# Patient Record
Sex: MALE | Race: WHITE | NOT HISPANIC OR LATINO | Employment: OTHER | URBAN - METROPOLITAN AREA
[De-identification: names, ages, dates, MRNs, and addresses within clinical notes are randomized per-mention and may not be internally consistent; named-entity substitution may affect disease eponyms.]

---

## 2019-12-23 ENCOUNTER — HISTORICAL (OUTPATIENT)
Dept: ADMINISTRATIVE | Facility: HOSPITAL | Age: 49
End: 2019-12-23

## 2019-12-26 LAB — FINAL CULTURE: NORMAL

## 2022-04-11 ENCOUNTER — HISTORICAL (OUTPATIENT)
Dept: ADMINISTRATIVE | Facility: HOSPITAL | Age: 52
End: 2022-04-11

## 2022-04-28 VITALS
WEIGHT: 248 LBS | DIASTOLIC BLOOD PRESSURE: 79 MMHG | BODY MASS INDEX: 31.83 KG/M2 | SYSTOLIC BLOOD PRESSURE: 123 MMHG | HEIGHT: 74 IN

## 2023-07-11 DIAGNOSIS — I26.99 PULMONARY EMBOLISM: Primary | ICD-10-CM

## 2023-07-24 ENCOUNTER — LAB VISIT (OUTPATIENT)
Dept: LAB | Facility: HOSPITAL | Age: 53
End: 2023-07-24
Payer: MEDICARE

## 2023-07-24 DIAGNOSIS — L02.92 RECURRENT BOILS: ICD-10-CM

## 2023-07-24 DIAGNOSIS — R97.8 OTHER ABNORMAL TUMOR MARKERS: ICD-10-CM

## 2023-07-24 DIAGNOSIS — R64 CACHEXIA: ICD-10-CM

## 2023-07-24 DIAGNOSIS — R78.9 FINDING OF UNSPECIFIED SUBSTANCE, NOT NORMALLY FOUND IN BLOOD: ICD-10-CM

## 2023-07-24 DIAGNOSIS — I82.409 DVT (DEEP VENOUS THROMBOSIS): ICD-10-CM

## 2023-07-24 DIAGNOSIS — I82.412 DVT OF DEEP FEMORAL VEIN, LEFT: Primary | ICD-10-CM

## 2023-07-24 DIAGNOSIS — R64 CACHEXIA: Primary | ICD-10-CM

## 2023-07-24 DIAGNOSIS — I82.412 DVT OF DEEP FEMORAL VEIN, LEFT: ICD-10-CM

## 2023-07-24 LAB
ALBUMIN SERPL-MCNC: 3.9 G/DL (ref 3.5–5)
ALBUMIN/GLOB SERPL: 1.1 RATIO (ref 1.1–2)
ALP SERPL-CCNC: 81 UNIT/L (ref 40–150)
ALT SERPL-CCNC: 32 UNIT/L (ref 0–55)
AST SERPL-CCNC: 22 UNIT/L (ref 5–34)
BASOPHILS # BLD AUTO: 0.12 X10(3)/MCL
BASOPHILS NFR BLD AUTO: 1.3 %
BILIRUBIN DIRECT+TOT PNL SERPL-MCNC: 0.5 MG/DL
BUN SERPL-MCNC: 13 MG/DL (ref 8.4–25.7)
CALCIUM SERPL-MCNC: 9.4 MG/DL (ref 8.4–10.2)
CANCER AG19-9 SERPL-ACNC: 23.45 UNIT/ML (ref 0–37)
CEA SERPL-MCNC: 3.91 NG/ML (ref 0–3)
CHLORIDE SERPL-SCNC: 108 MMOL/L (ref 98–107)
CO2 SERPL-SCNC: 24 MMOL/L (ref 22–29)
CREAT SERPL-MCNC: 0.86 MG/DL (ref 0.73–1.18)
CRP SERPL-MCNC: 4.1 MG/L
EOSINOPHIL # BLD AUTO: 0.1 X10(3)/MCL (ref 0–0.9)
EOSINOPHIL NFR BLD AUTO: 1.1 %
ERYTHROCYTE [DISTWIDTH] IN BLOOD BY AUTOMATED COUNT: 14 % (ref 11.5–17)
ERYTHROCYTE [SEDIMENTATION RATE] IN BLOOD: 1 MM/HR (ref 0–15)
FERRITIN SERPL-MCNC: 126.5 NG/ML (ref 21.81–274.66)
GFR SERPLBLD CREATININE-BSD FMLA CKD-EPI: >60 MLS/MIN/1.73/M2
GLOBULIN SER-MCNC: 3.5 GM/DL (ref 2.4–3.5)
GLUCOSE SERPL-MCNC: 230 MG/DL (ref 74–100)
HCT VFR BLD AUTO: 50.8 % (ref 42–52)
HGB BLD-MCNC: 16.5 G/DL (ref 14–18)
IGA SERPL-MCNC: 208 MG/DL (ref 63–484)
IGG SERPL-MCNC: 992 MG/DL (ref 540–1822)
IGM SERPL-MCNC: 127 MG/DL (ref 22–240)
IGM SERPL-MCNC: 127 MG/DL (ref 22–240)
IMM GRANULOCYTES # BLD AUTO: 0.19 X10(3)/MCL (ref 0–0.04)
IMM GRANULOCYTES NFR BLD AUTO: 2.1 %
LYMPHOCYTES # BLD AUTO: 2.29 X10(3)/MCL (ref 0.6–4.6)
LYMPHOCYTES NFR BLD AUTO: 25.5 %
MCH RBC QN AUTO: 29.3 PG (ref 27–31)
MCHC RBC AUTO-ENTMCNC: 32.5 G/DL (ref 33–36)
MCV RBC AUTO: 90.2 FL (ref 80–94)
MONOCYTES # BLD AUTO: 0.5 X10(3)/MCL (ref 0.1–1.3)
MONOCYTES NFR BLD AUTO: 5.6 %
NEUTROPHILS # BLD AUTO: 5.78 X10(3)/MCL (ref 2.1–9.2)
NEUTROPHILS NFR BLD AUTO: 64.4 %
PLATELET # BLD AUTO: 291 X10(3)/MCL (ref 130–400)
PMV BLD AUTO: 9.8 FL (ref 7.4–10.4)
POTASSIUM SERPL-SCNC: 4.4 MMOL/L (ref 3.5–5.1)
PROT SERPL-MCNC: 7.4 GM/DL (ref 6.4–8.3)
RBC # BLD AUTO: 5.63 X10(6)/MCL (ref 4.7–6.1)
SODIUM SERPL-SCNC: 137 MMOL/L (ref 136–145)
WBC # SPEC AUTO: 8.98 X10(3)/MCL (ref 4.5–11.5)

## 2023-07-24 PROCEDURE — 86334 IMMUNOFIX E-PHORESIS SERUM: CPT

## 2023-07-24 PROCEDURE — 85652 RBC SED RATE AUTOMATED: CPT

## 2023-07-24 PROCEDURE — 84165 PROTEIN E-PHORESIS SERUM: CPT

## 2023-07-24 PROCEDURE — 86301 IMMUNOASSAY TUMOR CA 19-9: CPT

## 2023-07-24 PROCEDURE — 82378 CARCINOEMBRYONIC ANTIGEN: CPT

## 2023-07-24 PROCEDURE — 82728 ASSAY OF FERRITIN: CPT

## 2023-07-24 PROCEDURE — 80053 COMPREHEN METABOLIC PANEL: CPT

## 2023-07-24 PROCEDURE — 86140 C-REACTIVE PROTEIN: CPT

## 2023-07-24 PROCEDURE — 82784 ASSAY IGA/IGD/IGG/IGM EACH: CPT

## 2023-07-24 PROCEDURE — 86146 BETA-2 GLYCOPROTEIN ANTIBODY: CPT

## 2023-07-24 PROCEDURE — 85025 COMPLETE CBC W/AUTO DIFF WBC: CPT

## 2023-07-24 PROCEDURE — 36415 COLL VENOUS BLD VENIPUNCTURE: CPT

## 2023-07-24 PROCEDURE — 83521 IG LIGHT CHAINS FREE EACH: CPT

## 2023-07-25 ENCOUNTER — DOCUMENTATION ONLY (OUTPATIENT)
Dept: HEMATOLOGY/ONCOLOGY | Facility: CLINIC | Age: 53
End: 2023-07-25
Payer: MEDICARE

## 2023-07-25 LAB
KAPPA LC FREE SER-MCNC: 1.37 MG/DL (ref 0.33–1.94)
KAPPA LC FREE/LAMBDA FREE SER: 1.01 {RATIO} (ref 0.26–1.65)
LAMBDA LC FREE SERPL-MCNC: 1.36 MG/DL (ref 0.57–2.63)
MAYO GENERIC ORDERABLE RESULT: NORMAL

## 2023-07-25 NOTE — PROGRESS NOTES
Urgent Referral called into Dr. Ford's office for recurrent boil to testicular area. They next appointment available is Sept. 19, 2023 at 1345 and they will put him on the cancellation list for earlier appointment. Dr. Hutton informed on when they could see him. Patient was called spoke with him and female regarding date, time, phone number and location of his appointment.

## 2023-07-26 LAB — MAYO GENERIC ORDERABLE RESULT: NORMAL

## 2023-07-27 LAB
ALBUMIN % SPEP (OHS): 47.48
ALBUMIN SERPL-MCNC: 3.3 G/DL (ref 3.5–5)
ALBUMIN/GLOB SERPL: 0.9 RATIO (ref 1.1–2)
ALPHA 1 GLOB (OHS): 0.22 GM/DL
ALPHA 1 GLOB% (OHS): 3.22
ALPHA 2 GLOB % (OHS): 14.33
ALPHA 2 GLOB (OHS): 0.99 GM/DL
BETA GLOB (OHS): 1.18 GM/DL
BETA GLOB% (OHS): 17.12
GAMMA GLOBULIN % (OHS): 17.86
GAMMA GLOBULIN (OHS): 1.23 GM/DL
GLOBULIN SER-MCNC: 3.6 GM/DL (ref 2.4–3.5)
M SPIKE % (OHS): ABNORMAL
M SPIKE (OHS): ABNORMAL
PATH REV: NORMAL
PROT SERPL-MCNC: 6.9 GM/DL (ref 6.4–8.3)

## 2023-07-31 ENCOUNTER — HOSPITAL ENCOUNTER (EMERGENCY)
Facility: HOSPITAL | Age: 53
Discharge: HOME | End: 2023-07-31
Attending: INTERNAL MEDICINE
Payer: MEDICARE

## 2023-07-31 ENCOUNTER — HOSPITAL ENCOUNTER (OUTPATIENT)
Dept: RADIOLOGY | Facility: HOSPITAL | Age: 53
Discharge: HOME | End: 2023-07-31
Attending: INTERNAL MEDICINE
Payer: MEDICARE

## 2023-07-31 VITALS
DIASTOLIC BLOOD PRESSURE: 97 MMHG | HEART RATE: 74 BPM | SYSTOLIC BLOOD PRESSURE: 149 MMHG | RESPIRATION RATE: 16 BRPM | WEIGHT: 276 LBS | HEIGHT: 74 IN | OXYGEN SATURATION: 97 % | BODY MASS INDEX: 35.42 KG/M2 | TEMPERATURE: 97 F

## 2023-07-31 DIAGNOSIS — R64 CACHEXIA: ICD-10-CM
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
## 2023-07-31 DIAGNOSIS — S00.96XA INSECT BITE OF HEAD, UNSPECIFIED PART, INITIAL ENCOUNTER: Primary | ICD-10-CM

## 2023-07-31 PROCEDURE — 96372 THER/PROPH/DIAG INJ SC/IM: CPT | Mod: 59 | Performed by: INTERNAL MEDICINE

## 2023-07-31 PROCEDURE — 74177 CT ABD & PELVIS W/CONTRAST: CPT | Mod: TC

## 2023-07-31 PROCEDURE — 63600175 PHARM REV CODE 636 W HCPCS: Performed by: INTERNAL MEDICINE

## 2023-07-31 PROCEDURE — A9503 TC99M MEDRONATE: HCPCS

## 2023-07-31 PROCEDURE — 25500020 PHARM REV CODE 255: Performed by: INTERNAL MEDICINE

## 2023-07-31 PROCEDURE — 99283 EMERGENCY DEPT VISIT LOW MDM: CPT | Mod: 25

## 2023-07-31 PROCEDURE — 71260 CT THORAX DX C+: CPT | Mod: TC

## 2023-07-31 RX ORDER — DIPHENHYDRAMINE HYDROCHLORIDE 50 MG/ML
25 INJECTION INTRAMUSCULAR; INTRAVENOUS
Status: COMPLETED | OUTPATIENT
Start: 2023-07-31 | End: 2023-07-31

## 2023-07-31 RX ADMIN — IOPAMIDOL 100 ML: 755 INJECTION, SOLUTION INTRAVENOUS at 08:07

## 2023-07-31 RX ADMIN — DIPHENHYDRAMINE HYDROCHLORIDE 25 MG: 50 INJECTION INTRAMUSCULAR; INTRAVENOUS at 09:07

## 2023-07-31 NOTE — ED PROVIDER NOTES
Encounter Date: 7/31/2023  History from patient     History     Chief Complaint   Patient presents with    Allergic Reaction     Ambulated into ER from radiology department, pt reports tickle in throat after having IV contrast for testing     HPI    Patient was getting CT scan done and he noticed a fly flying in the room, and then was getting out he felt burning sensation in his left face and then he had a little tickling in the throat and spit up some sputum.  So they brought him to the emergency room.  He is a little spot on his left cheek.    Review of patient's allergies indicates:   Allergen Reactions    Buprenorphine-naloxone     Tylox [oxycodone-acetaminophen]      No past medical history on file.  Past Surgical History:   Procedure Laterality Date    NASAL POLYP EXCISION      NECK SURGERY      PARTIAL KNEE ARTHROPLASTY Left     ROTATOR CUFF REPAIR      TYMPANOPLASTY       Family History   Problem Relation Age of Onset    Seizures Mother     Heart failure Father      Social History     Tobacco Use    Smoking status: Every Day     Current packs/day: 0.50     Average packs/day: 0.5 packs/day for 43.6 years (21.8 ttl pk-yrs)     Types: Cigarettes     Start date: 1980    Smokeless tobacco: Never   Substance Use Topics    Alcohol use: Never    Drug use: Never     Review of Systems   HENT:  Negative for trouble swallowing and voice change.    Eyes:  Negative for visual disturbance.   Respiratory:  Negative for cough and shortness of breath.    Cardiovascular:  Negative for chest pain.   Gastrointestinal:  Negative for abdominal pain, diarrhea and vomiting.   Genitourinary:  Negative for dysuria and hematuria.   Musculoskeletal:  Negative for gait problem.        No Pain.   Skin:  Negative for color change and rash.        Insect bite to the left face   Neurological:  Negative for headaches.   Psychiatric/Behavioral:  Negative for behavioral problems and sleep disturbance.    All other systems reviewed and are  negative.      Physical Exam     Initial Vitals [07/31/23 0907]   BP Pulse Resp Temp SpO2   (!) 149/97 74 16 97.3 °F (36.3 °C) 97 %      MAP       --         Physical Exam    Nursing note and vitals reviewed.  Constitutional: He appears well-developed.   HENT:   Head: Atraumatic.   Nose: Nose normal.   Mouth/Throat: Oropharynx is clear and moist. No oropharyngeal exudate.   Eyes: EOM are normal. Pupils are equal, round, and reactive to light.   Neck: Neck supple.   No stridor   Cardiovascular:  Normal rate.           Pulmonary/Chest: Breath sounds normal.   Musculoskeletal:         General: Normal range of motion.      Cervical back: Neck supple.     Neurological: He is alert. GCS score is 15. GCS eye subscore is 4. GCS verbal subscore is 5. GCS motor subscore is 6.   Skin:   No rash         ED Course   Procedures  Labs Reviewed - No data to display       Imaging Results    None          Medications   diphenhydrAMINE injection 25 mg (has no administration in time range)     Medical Decision Making:   Initial Assessment:   Patient essentially got bitten by an insect in the CT scanner, and had burning sensation on the left cheek, had some ticklish in the throat, but he is gotten better, the rash has not gotten worse, he is small spot on the left cheek, I will give him Benadryl and let him go back to the radiology department to get the bone scan done which is being worked up for some cancer which he does not know where it is at this time.                          Clinical Impression:   Final diagnoses:  [S00.96XA, W57.XXXA] Insect bite of head, unspecified part, initial encounter (Primary)        ED Disposition Condition    Discharge Stable          ED Prescriptions    None       Follow-up Information       Follow up With Specialties Details Why Contact Info    PMD  In 2 days               Abisai Monahan MD  07/31/23 2588

## 2023-08-08 ENCOUNTER — DOCUMENTATION ONLY (OUTPATIENT)
Dept: HEMATOLOGY/ONCOLOGY | Facility: CLINIC | Age: 53
End: 2023-08-08
Payer: MEDICARE

## 2023-08-08 NOTE — PROGRESS NOTES
[facility] in [place] called regarding referral and patient has an appointment schedule for August 29@ 1430.

## 2023-09-21 DIAGNOSIS — I82.412 DVT OF DEEP FEMORAL VEIN, LEFT: Primary | ICD-10-CM

## 2023-09-22 ENCOUNTER — OFFICE VISIT (OUTPATIENT)
Dept: HEMATOLOGY/ONCOLOGY | Facility: CLINIC | Age: 53
End: 2023-09-22
Payer: MEDICARE

## 2023-09-22 VITALS
HEART RATE: 68 BPM | DIASTOLIC BLOOD PRESSURE: 78 MMHG | SYSTOLIC BLOOD PRESSURE: 126 MMHG | WEIGHT: 284.63 LBS | OXYGEN SATURATION: 98 % | RESPIRATION RATE: 20 BRPM | BODY MASS INDEX: 36.53 KG/M2 | TEMPERATURE: 98 F | HEIGHT: 74 IN

## 2023-09-22 DIAGNOSIS — R64 CACHEXIA: ICD-10-CM

## 2023-09-22 DIAGNOSIS — R77.1 ELEVATED SERUM GLOBULIN LEVEL: ICD-10-CM

## 2023-09-22 DIAGNOSIS — I82.412 DVT OF DEEP FEMORAL VEIN, LEFT: Primary | ICD-10-CM

## 2023-09-22 DIAGNOSIS — R04.2 COUGHING UP BLOOD: Primary | ICD-10-CM

## 2023-09-22 DIAGNOSIS — I26.99 ACUTE PULMONARY EMBOLISM, UNSPECIFIED PULMONARY EMBOLISM TYPE, UNSPECIFIED WHETHER ACUTE COR PULMONALE PRESENT: ICD-10-CM

## 2023-09-22 DIAGNOSIS — L02.92 RECURRENT BOILS: ICD-10-CM

## 2023-09-22 PROCEDURE — 3078F DIAST BP <80 MM HG: CPT | Mod: CPTII,S$GLB,, | Performed by: INTERNAL MEDICINE

## 2023-09-22 PROCEDURE — 99215 OFFICE O/P EST HI 40 MIN: CPT | Mod: S$GLB,,, | Performed by: INTERNAL MEDICINE

## 2023-09-22 PROCEDURE — 3008F BODY MASS INDEX DOCD: CPT | Mod: CPTII,S$GLB,, | Performed by: INTERNAL MEDICINE

## 2023-09-22 PROCEDURE — 99215 PR OFFICE/OUTPT VISIT, EST, LEVL V, 40-54 MIN: ICD-10-PCS | Mod: S$GLB,,, | Performed by: INTERNAL MEDICINE

## 2023-09-22 PROCEDURE — 99999 PR PBB SHADOW E&M-EST. PATIENT-LVL IV: CPT | Mod: PBBFAC,,, | Performed by: INTERNAL MEDICINE

## 2023-09-22 PROCEDURE — 3078F PR MOST RECENT DIASTOLIC BLOOD PRESSURE < 80 MM HG: ICD-10-PCS | Mod: CPTII,S$GLB,, | Performed by: INTERNAL MEDICINE

## 2023-09-22 PROCEDURE — 3074F SYST BP LT 130 MM HG: CPT | Mod: CPTII,S$GLB,, | Performed by: INTERNAL MEDICINE

## 2023-09-22 PROCEDURE — 3074F PR MOST RECENT SYSTOLIC BLOOD PRESSURE < 130 MM HG: ICD-10-PCS | Mod: CPTII,S$GLB,, | Performed by: INTERNAL MEDICINE

## 2023-09-22 PROCEDURE — 99999 PR PBB SHADOW E&M-EST. PATIENT-LVL IV: ICD-10-PCS | Mod: PBBFAC,,, | Performed by: INTERNAL MEDICINE

## 2023-09-22 PROCEDURE — 1159F PR MEDICATION LIST DOCUMENTED IN MEDICAL RECORD: ICD-10-PCS | Mod: CPTII,S$GLB,, | Performed by: INTERNAL MEDICINE

## 2023-09-22 PROCEDURE — 3008F PR BODY MASS INDEX (BMI) DOCUMENTED: ICD-10-PCS | Mod: CPTII,S$GLB,, | Performed by: INTERNAL MEDICINE

## 2023-09-22 PROCEDURE — 1159F MED LIST DOCD IN RCRD: CPT | Mod: CPTII,S$GLB,, | Performed by: INTERNAL MEDICINE

## 2023-09-22 RX ORDER — METFORMIN HYDROCHLORIDE 1000 MG/1
1000 TABLET ORAL 2 TIMES DAILY
COMMUNITY
Start: 2023-09-20

## 2023-09-22 RX ORDER — IPRATROPIUM BROMIDE 21 UG/1
SPRAY, METERED NASAL
COMMUNITY
Start: 2023-08-14

## 2023-09-22 RX ORDER — DULAGLUTIDE 0.75 MG/.5ML
0.75 INJECTION, SOLUTION SUBCUTANEOUS
COMMUNITY
Start: 2023-09-13

## 2023-09-22 RX ORDER — TIZANIDINE 4 MG/1
4 TABLET ORAL 2 TIMES DAILY
COMMUNITY
Start: 2023-09-20

## 2023-09-22 RX ORDER — TEMAZEPAM 30 MG/1
30 CAPSULE ORAL NIGHTLY
COMMUNITY
Start: 2023-08-24

## 2023-09-22 RX ORDER — TAMSULOSIN HYDROCHLORIDE 0.4 MG/1
0.4 CAPSULE ORAL DAILY
COMMUNITY
Start: 2023-09-20

## 2023-09-22 NOTE — PROGRESS NOTES
PATIENT: Aram Thurston  MRN: [id number]  DATE: 9/22/2023        Chief Complaint: Deep Vein Thrombosis (F/u with labs-- Patient reports knots on back of head)      Oncologic History:      History Left DVT and Pulmonary Emboli    Treatment Heparin--> Eliquis--> Savaysa 60mg BID due to cost of Eliquis.    Pathology     Patient is a 52 year old male th a history of asthma who presented to ED at [facility] with LLE swelling/numbness over past 4 days with SOB. Work up was notable for multifocal pulmonary thromboembolism with most proximal emboli lobar, no heart strain. LLE US noted extensive occlusive thrombus in the left proximal great saphenous vein and extending from the left proximal superficial femoral vein into the left calf. He was admitted for acute PE, DVT. Placed on full dose lovenox. He did well symptoms improved on room air. He was transitioned to Eliquis and discharged home. Follow up with PCP within 1 week (may need referral to ortho/neurosurgery for spinal stenosis). At the time of discharged he was referred to the [facility] colorectal group.   AT his initial evaluation here in July he reported a 30 pound weight loss in the Spring; therefore concern for underlying malignancy. CT CAP ordered. He has an appt soon with GI for endoscopy  AT his follow up visit on 923/23 he reported hemoptysis for a year or two. He is now Savaysa 60mg and he is now on Turlicity once a week.   He has seen Dr. Serna ENT in [place] for nasal polyps.   He has herniated discs in his neck and there are plans to re-do surgery in his neck through Dr. Demarco in [facility].     Final Medication List   amitriptyline 10 mg tabletCommonly known as: [id number] mg, Oral, Nightly    Eliquis DVT-PE Treat 30D Start DspkGeneric drug: apixabanTake 2 tablets by mouth twice daily for 7 days, then take 1 tablet by mouth twice daily thereafter.    HYDROcodone-acetaminophen  mg per tablet    LABS:  06/08/23:  WBC 10.0, HGB 15.6, MCV 88, PLT 189K, CMP  "WNL except glucose 236  BNP 11*   INR 1.0 /PTT 29      Past Surgical History:   Procedure Laterality Date   BACK SURGERY   JOINT REPLACEMENT   NECK SURGERY     Family history: Cousin and father MI before age 50; FH reviewed and non-contributory to this present illness  Social History     Tobacco Use   Smoking status: Every Day   Packs/day: 0.50   Types: Cigarettes   Smokeless tobacco: Never   Alcohol use: Not Currently     Allergies   Allergen Reactions   Tylox [Oxycodone-Acetaminophen] Shortness Of Breath     Subjective:   Interval History: Mr. Thurston is a 52 y.o. male who returns for new evaluation and treatment of. DVT and Pulmonary emboi associated with 30 pound weight loss.    9/22/23:  anticardiolipin and myeloma work up negative. He saw Dr. Ford who drained a testicular abscess; then one appeared on the other side. PSA was checked and was normal. CT showed prominent prostate and sigmoid colon wall thickening. He has a GI appt next week. CEA slightly elevated at 3.9. Since no obvious cancer will perform thrombophilia workup. He now reports a "1-2 year" history of intermittent hemoptysis. Since he has smoked since age 14 will refer him to pulmonary for bronchoscopy;.   Past Medical History: History reviewed. No pertinent past medical history.    Past Surgical HIstory:   Past Surgical History:   Procedure Laterality Date    NASAL POLYP EXCISION      NECK SURGERY      PARTIAL KNEE ARTHROPLASTY Left     ROTATOR CUFF REPAIR      TYMPANOPLASTY         Family History:   Family History   Problem Relation Age of Onset    Seizures Mother     Heart failure Father        Social History:  reports that he has been smoking cigarettes. He started smoking about 43 years ago. He has a 21.9 pack-year smoking history. He has never used smokeless tobacco. He reports that he does not drink alcohol and does not use drugs.    Allergies:  Review of patient's allergies indicates:   Allergen Reactions    Buprenorphine-naloxone     " "Tylox [oxycodone-acetaminophen]        Medications:  Current Outpatient Medications   Medication Sig Dispense Refill    amitriptyline (ELAVIL) 75 MG tablet Take 75 mg by mouth 2 (two) times daily as needed.      citalopram (CELEXA) 20 MG tablet Take 1 tablet by mouth once daily.      fluticasone propionate (FLONASE) 50 mcg/actuation nasal spray 1 spray by Each Nostril route daily as needed.      HYDROcodone-acetaminophen (NORCO)  mg per tablet Take 1 tablet by mouth every 6 (six) hours as needed.      ipratropium (ATROVENT) 21 mcg (0.03 %) nasal spray SPRAY TWO SPRAYS IN EACH NOSTRIL THREE TIMES DAILY AS NEEDED FOR RUNNY NOSE      metFORMIN (GLUCOPHAGE) 1000 MG tablet       sumatriptan (IMITREX) 50 MG tablet Take 1 tablet by mouth as needed.      tamsulosin (FLOMAX) 0.4 mg Cap       temazepam (RESTORIL) 30 mg capsule Take 30 mg by mouth every evening.      tiZANidine (ZANAFLEX) 4 MG tablet       TRULICITY 0.75 mg/0.5 mL pen injector Inject 0.75 mg SUBCUTANEOUSLY ONCE EVERY WEEK       No current facility-administered medications for this visit.       Review of Systems   Except as documented all systems reviewed and negative  Review of Systems   Constitutional: Negative.   HENT: Negative.   Eyes: Negative.   Respiratory: Negative. Negative for shortness of breath.   Cardiovascular: Negative.   Gastrointestinal: Negative.   Genitourinary: Negative.   Musculoskeletal: Negative.   Skin: Negative.   Neurological: Negative.   Endo/Heme/Allergies: Negative.   Psychiatric/Behavioral: Negative.   ECOG Performance Status:   ECOG SCORE             Objective:      Vitals:   Vitals:    09/22/23 1030   BP: 126/78   BP Location: Right arm   Pulse: 68   Resp: 20   Temp: 97.5 °F (36.4 °C)   SpO2: 98%   Weight: 129.1 kg (284 lb 9.6 oz)   Height: 6' 2.02" (1.88 m)     BMI: Body mass index is 36.52 kg/m².    Physical Exam  Physical Exam  Vitals and nursing note reviewed.   Constitutional:   General: He is not in acute " distress.  Appearance: Normal appearance. He is morbidly obese. He is not toxic-appearing.   HENT:   Head: Normocephalic and atraumatic.   Nose: No rhinorrhea.   Mouth/Throat:   Mouth: Mucous membranes are moist.   Eyes:   Pupils: Pupils are equal, round, and reactive to light.   Cardiovascular:   Rate and Rhythm: Normal rate and regular rhythm.   Pulses: Normal pulses.   Heart sounds: Normal heart sounds. No murmur heard.  Pulmonary:   Effort: Pulmonary effort is normal. No respiratory distress.   Breath sounds: Normal breath sounds. No wheezing.   Abdominal:   General: Bowel sounds are normal. There is no distension.   Palpations: Abdomen is soft.   Tenderness: There is no abdominal tenderness. There is no guarding.   Musculoskeletal:   Cervical back: Neck supple.   Right lower leg: No edema.   Left lower leg: No edema.   Skin:  General: Skin is warm and dry.   Neurological:   General: No focal deficit present.   Mental Status: He is alert and oriented to person, place, and time. Mental status is at baseline.   Psychiatric:   Mood and Affect: Mood is anxious.   Behavior: Behavior is cooperative.     Laboratory Data:  Lab Visit on 09/22/2023   Component Date Value Ref Range Status    Sodium Level 09/22/2023 139  136 - 145 mmol/L Final    Potassium Level 09/22/2023 4.3  3.5 - 5.1 mmol/L Final    Chloride 09/22/2023 105  98 - 107 mmol/L Final    Carbon Dioxide 09/22/2023 24  22 - 29 mmol/L Final    Glucose Level 09/22/2023 197 (H)  74 - 100 mg/dL Final    Blood Urea Nitrogen 09/22/2023 10.0  8.4 - 25.7 mg/dL Final    Creatinine 09/22/2023 0.92  0.73 - 1.18 mg/dL Final    Calcium Level Total 09/22/2023 9.5  8.4 - 10.2 mg/dL Final    Protein Total 09/22/2023 7.6  6.4 - 8.3 gm/dL Final    Albumin Level 09/22/2023 4.0  3.5 - 5.0 g/dL Final    Globulin 09/22/2023 3.6 (H)  2.4 - 3.5 gm/dL Final    Albumin/Globulin Ratio 09/22/2023 1.1  1.1 - 2.0 ratio Final    Bilirubin Total 09/22/2023 0.6  <=1.5 mg/dL Final    Alkaline  Phosphatase 2023 83  40 - 150 unit/L Final    Alanine Aminotransferase 2023 23  0 - 55 unit/L Final    Aspartate Aminotransferase 2023 14  5 - 34 unit/L Final    eGFR 2023 >60  mls/min/1.73/m2 Final    WBC 2023 8.04  4.50 - 11.50 x10(3)/mcL Final    RBC 2023 5.79  4.70 - 6.10 x10(6)/mcL Final    Hgb 2023 17.2  14.0 - 18.0 g/dL Final    Hct 2023 53.5 (H)  42.0 - 52.0 % Final    MCV 2023 92.4  80.0 - 94.0 fL Final    MCH 2023 29.7  27.0 - 31.0 pg Final    MCHC 2023 32.1 (L)  33.0 - 36.0 g/dL Final    RDW 2023 13.9  11.5 - 17.0 % Final    Platelet 2023 287  130 - 400 x10(3)/mcL Final    MPV 2023 9.7  7.4 - 10.4 fL Final    Neut % 2023 62.2  % Final    Lymph % 2023 27.5  % Final    Mono % 2023 6.3  % Final    Eos % 2023 2.0  % Final    Basophil % 2023 1.6  % Final    Lymph # 2023 2.21  0.6 - 4.6 x10(3)/mcL Final    Neut # 2023 5.00  2.1 - 9.2 x10(3)/mcL Final    Mono # 2023 0.51  0.1 - 1.3 x10(3)/mcL Final    Eos # 2023 0.16  0 - 0.9 x10(3)/mcL Final    Baso # 2023 0.13  <=0.2 x10(3)/mcL Final    IG# 2023 0.03  0 - 0.04 x10(3)/mcL Final    IG% 2023 0.4  % Final         Imagin2023 CT Angiogram Pulmonary  1. Multifocal pulmonary thromboembolism with most proximal emboli lobar. There is no evidence of right heart strain. 2. Lungs remain clear. 3. Cholelithiasis. 4. Esophageal features presumably due to GERD. 5. Hepatosteatosis. Electronically signed by: Mike Mills DO on 2023 05:49:28 AM US/Eastern Per PQRS, all CT exams are performed using one or more of the following dose reduction techniques: automated exposure control, adjustment of the mA and/or kV according to patient size, or use of iterative reconstruction technique. EXAM: CTA Chest, with Contrast.    23 CT Cervical Spine without Contrast  1. CT imaging shows no acute intracranial  abnormality. 2. Left maxillary sinus disease in this patient with prior ESS. CT CERVICAL SPINE FINDINGS: There is no evidence of fracture. Anterior discectomy and fusion (ACDF) is seen at C4-C5 with stand-alone implanted device. ACDF also is present at C5-C6 with anterior plate with screw fixation. Small anterior marginal osteophyte formation is noted at C3-C4. There is no malalignment. Prevertebral and retropharyngeal soft tissues are normal. Lung apices are clear. IMPRESSION: 1. CT of the cervical spine shows no acute osseous abnormality. 2. Operative changes of C4-C6 ACDF.   6/6/23 CT Head without Contrast  1. CT imaging shows no acute intracranial abnormality. 2. Left maxillary sinus disease in this patient with prior ESS. CT CERVICAL SPINE FINDINGS: There is no evidence of fracture. Anterior discectomy and fusion (ACDF) is seen at C4-C5 with stand-alone implanted device. ACDF also is present at C5-C6 with anterior plate with screw fixation. Small anterior marginal osteophyte formation is noted at C3-C4. There is no malalignment. Prevertebral and retropharyngeal soft tissues are normal. Lung apices are clear. IMPRESSION: 1. CT of the cervical spine shows no acute osseous abnormality. 2. Operative changes of C4-C6 ACDF. 6/6/23 CT Lumbar Spine without Contrast  1. CT shows no acute osseous abnormality within the thoracic spine. 2. Mild degenerative spondylosis. 3. Esophageal features suggesting GERD. CT LUMBAR SPINE FINDINGS: Lumbar vertebral body stature is maintained. Anterior cortical margins are smooth. Operative changes are noted with bilateral intrapedicular screws in L3, L4 and L5 with anchoring vertical rods. Spinous processes have been resected at L3 and L5 with laminectomy of L4. Central canal is poorly evaluated without intrathecal contrast. However, there is severe osseous spinal stenosis at L2-L3. Minimal atherosclerotic calcified plaquing in the abdominal aorta is noted. Imaging continued through the  sacroiliac joints which show no acute pathology. Innumerable diverticula in the sigmoid are seen. IMPRESSION: 1. Severe osseous spinal stenosis at L2-L3. 2. Posterior orthopedic fusion spanning L3 through L5. 3. Sigmoidal diverticulosis. :     6/6/23 CT Thoracic Spine without Contrast  1. CT shows no acute osseous abnormality within the thoracic spine. 2. Mild degenerative spondylosis. 3. Esophageal features suggesting GERD. CT LUMBAR SPINE FINDINGS: Lumbar vertebral body stature is maintained. Anterior cortical margins are smooth. Operative changes are noted with bilateral intrapedicular screws in L3, L4 and L5 with anchoring vertical rods. Spinous processes have been resected at L3 and L5 with laminectomy of L4. Central canal is poorly evaluated without intrathecal contrast. However, there is severe osseous spinal stenosis at L2-L3. Minimal atherosclerotic calcified plaquing in the abdominal aorta is noted. Imaging continued through the sacroiliac joints which show no acute pathology. Innumerable diverticula in the sigmoid are seen. IMPRESSION: 1. Severe osseous spinal stenosis at L2-L3. 2. Posterior orthopedic fusion spanning L3 through L5. 3. Sigmoidal diverticulosis.     7/24/23 CT AP:  1. Mild mucosal prominence, edema, and diverticula at the junction of the descending sigmoid colon suggesting a mild acute diverticulitis borderline hepatomegaly  2. Thoracolumbar spondylosis with postsurgical changes at the lumbar spine and stabilization hardware.  There is suspect moderate to severe encroachment into the central spinal canal and to a lesser extent the neural foramina at the upper and mid lumbar spine.  3. Mild mucosal prominence at the lower esophagus  4. Borderline prostatomegaly with mass effect on the posteroinferior bladder margin.  Underlying pathology must be considered  5. Cholelithiasis without evidence of acute cholecystitis     Assessment:     1. DVT of deep femoral vein, left    2. Acute pulmonary  embolism, unspecified pulmonary embolism type, unspecified whether acute cor pulmonale present    3. Cachexia    4. Elevated serum globulin level    Myeloma work up is negative  CT AP shows borderine prostamegaly with mass effect and mucosal prominence edema and diverticula at the junction of descending sigmoid colon. Since no obvious cancer will perform thrombophilia work up but will refer for colonoscipy; PSA by Dr. Ford was normal at <1..  CEA is slightly elevated at 3.9.to see Dr Tomlin at [facility] on 9/26/23 for GI evaluation.  Plan:     Problem List Items Addressed This Visit    None  Visit Diagnoses       DVT of deep femoral vein, left    -  Primary    Acute pulmonary embolism, unspecified pulmonary embolism type, unspecified whether acute cor pulmonale present        Cachexia        Elevated serum globulin level            Follow up with gastroenterology on 9/26/23 for colonosocpy; not abnormal CT and slightly elevated CEA  PSA by Dr. Ford was normal  Refer to pulmonary for an at least a year history of hemoptysis and has been smoking since age 13 yo; I also suggested he follow up with his ENT   Complete Thombophilia workup today with Factor V, prothrombin gene mutation. Protein C and S, AT III antigen. He has normal anticardiolipin Ab  LOI 2 to evaluate elevated HCT.     Repeat left venous NIVA for ongoing swelling.   RTC in one month

## 2023-09-25 ENCOUNTER — TELEPHONE (OUTPATIENT)
Dept: HEMATOLOGY/ONCOLOGY | Facility: CLINIC | Age: 53
End: 2023-09-25
Payer: MEDICARE

## 2023-09-25 DIAGNOSIS — I82.412 DVT OF DEEP FEMORAL VEIN, LEFT: Primary | ICD-10-CM

## 2023-09-25 NOTE — TELEPHONE ENCOUNTER
----- Message from Ananya Heredia sent at 9/25/2023  1:32 PM CDT -----  Regarding: RE: u/s  U/S 10/02/23 @ [facility]    Left detailed v/m for patient  ----- Message -----  From: Natasha Talavera LPN  Sent: 9/22/2023  12:36 PM CDT  To: Ananya Heredia  Subject: u/s                                              Pt needs left leg u/s soon please

## 2023-10-03 ENCOUNTER — HOSPITAL ENCOUNTER (OUTPATIENT)
Dept: RADIOLOGY | Facility: HOSPITAL | Age: 53
Discharge: HOME | End: 2023-10-03
Attending: INTERNAL MEDICINE
Payer: MEDICARE

## 2023-10-03 ENCOUNTER — OFFICE VISIT (OUTPATIENT)
Dept: HEMATOLOGY/ONCOLOGY | Facility: CLINIC | Age: 53
End: 2023-10-03
Payer: MEDICARE

## 2023-10-03 VITALS
TEMPERATURE: 98 F | BODY MASS INDEX: 36.06 KG/M2 | RESPIRATION RATE: 19 BRPM | DIASTOLIC BLOOD PRESSURE: 79 MMHG | SYSTOLIC BLOOD PRESSURE: 130 MMHG | OXYGEN SATURATION: 99 % | HEIGHT: 74 IN | HEART RATE: 81 BPM | WEIGHT: 281 LBS

## 2023-10-03 DIAGNOSIS — I82.412 DVT OF DEEP FEMORAL VEIN, LEFT: ICD-10-CM

## 2023-10-03 DIAGNOSIS — R04.2 COUGHING UP BLOOD: ICD-10-CM

## 2023-10-03 DIAGNOSIS — K62.5 BRIGHT RED BLOOD PER RECTUM: ICD-10-CM

## 2023-10-03 DIAGNOSIS — I26.99 ACUTE PULMONARY EMBOLISM, UNSPECIFIED PULMONARY EMBOLISM TYPE, UNSPECIFIED WHETHER ACUTE COR PULMONALE PRESENT: Primary | ICD-10-CM

## 2023-10-03 PROCEDURE — 3075F SYST BP GE 130 - 139MM HG: CPT | Mod: CPTII,S$GLB,, | Performed by: INTERNAL MEDICINE

## 2023-10-03 PROCEDURE — 99999 PR PBB SHADOW E&M-EST. PATIENT-LVL IV: CPT | Mod: PBBFAC,,, | Performed by: INTERNAL MEDICINE

## 2023-10-03 PROCEDURE — 3075F PR MOST RECENT SYSTOLIC BLOOD PRESS GE 130-139MM HG: ICD-10-PCS | Mod: CPTII,S$GLB,, | Performed by: INTERNAL MEDICINE

## 2023-10-03 PROCEDURE — 3008F PR BODY MASS INDEX (BMI) DOCUMENTED: ICD-10-PCS | Mod: CPTII,S$GLB,, | Performed by: INTERNAL MEDICINE

## 2023-10-03 PROCEDURE — 99999 PR PBB SHADOW E&M-EST. PATIENT-LVL IV: ICD-10-PCS | Mod: PBBFAC,,, | Performed by: INTERNAL MEDICINE

## 2023-10-03 PROCEDURE — 1159F MED LIST DOCD IN RCRD: CPT | Mod: CPTII,S$GLB,, | Performed by: INTERNAL MEDICINE

## 2023-10-03 PROCEDURE — 3078F PR MOST RECENT DIASTOLIC BLOOD PRESSURE < 80 MM HG: ICD-10-PCS | Mod: CPTII,S$GLB,, | Performed by: INTERNAL MEDICINE

## 2023-10-03 PROCEDURE — 99214 PR OFFICE/OUTPT VISIT, EST, LEVL IV, 30-39 MIN: ICD-10-PCS | Mod: S$GLB,,, | Performed by: INTERNAL MEDICINE

## 2023-10-03 PROCEDURE — 3008F BODY MASS INDEX DOCD: CPT | Mod: CPTII,S$GLB,, | Performed by: INTERNAL MEDICINE

## 2023-10-03 PROCEDURE — 1159F PR MEDICATION LIST DOCUMENTED IN MEDICAL RECORD: ICD-10-PCS | Mod: CPTII,S$GLB,, | Performed by: INTERNAL MEDICINE

## 2023-10-03 PROCEDURE — 93971 EXTREMITY STUDY: CPT | Mod: TC,LT

## 2023-10-03 PROCEDURE — 3078F DIAST BP <80 MM HG: CPT | Mod: CPTII,S$GLB,, | Performed by: INTERNAL MEDICINE

## 2023-10-03 PROCEDURE — 99214 OFFICE O/P EST MOD 30 MIN: CPT | Mod: S$GLB,,, | Performed by: INTERNAL MEDICINE

## 2023-10-03 NOTE — PROGRESS NOTES
PATIENT: Aram Thurston  MRN: [id number]  DATE: 10/4/2023        Chief Complaint: Pain (Pt reports neck,back, and hip pain.)      Oncologic History:      History Left DVT and Pulmonary Emboli dx 6/7/23    Treatment Heparin--> Eliquis--> Savaysa 60mg BID due to cost of Eliquis.    Pathology     Patient is a 52 year old male th a history of asthma who presented to ED at [facility] with LLE swelling/numbness over past 4 days with SOB. Work up was notable for multifocal pulmonary thromboembolism with most proximal emboli lobar, no heart strain. LLE US noted extensive occlusive thrombus in the left proximal great saphenous vein and extending from the left proximal superficial femoral vein into the left calf. He was admitted for acute PE, DVT. Placed on full dose lovenox. He did well symptoms improved on room air. He was transitioned to Eliquis and discharged home. Follow up with PCP within 1 week (may need referral to ortho/neurosurgery for spinal stenosis). At the time of discharged he was referred to the [facility] colorectal group.   AT his initial evaluation here in July he reported a 30 pound weight loss in the Spring; therefore concern for underlying malignancy. CT CAP ordered. He has an appt soon with GI for endoscopy  AT his follow up visit on 923/23 he reported hemoptysis for a year or two. He is now Savaysa 60mg and he is now on Turlicity once a week.   He has seen Dr. Serna ENT in [place] for nasal polyps.   He has herniated discs in his neck and there are plans to re-do surgery in his neck through Dr. Demarco in [facility].     Final Medication List   amitriptyline 10 mg tabletCommonly known as: [id number] mg, Oral, Nightly    Eliquis DVT-PE Treat 30D Start DspkGeneric drug: apixabanTake 2 tablets by mouth twice daily for 7 days, then take 1 tablet by mouth twice daily thereafter.    HYDROcodone-acetaminophen  mg per tablet    LABS:  06/08/23:  WBC 10.0, HGB 15.6, MCV 88, PLT 189K, CMP WNL except glucose  "236  BNP 11*   INR 1.0 /PTT 29  7/24/23 beta 2 glycoproteins and cardiolipin negative.   9/22/23 JAK2 V617f negative, Prothrombin gene muation negative, Factor V leiden negative, Protein S and C and ATIII antigens WNL.       Past Surgical History:   Procedure Laterality Date   BACK SURGERY   JOINT REPLACEMENT   NECK SURGERY     Family history: Cousin and father MI before age 50; FH reviewed and non-contributory to this present illness  Social History     Tobacco Use   Smoking status: Every Day   Packs/day: 0.50   Types: Cigarettes   Smokeless tobacco: Never   Alcohol use: Not Currently     Allergies   Allergen Reactions   Tylox [Oxycodone-Acetaminophen] Shortness Of Breath     Subjective:   Interval History: Mr. Thurston is a 52 y.o. male who returns for new evaluation and treatment of. DVT and Pulmonary emboi associated with 30 pound weight loss.   10/3/23 has colonoscopy on 10/11/23. He did hear from pulmonary but no appointment yet. They are 2 months out. He is now on Trulicity; he has but some of the weight back on. He does not have much of an appetite but is forcing himself to eat. He has BRBPR and hemoptysis. He has hip pain    9/22/23:  anticardiolipin and myeloma work up negative. He saw Dr. Ford who drained a testicular abscess; then one appeared on the other side. PSA was checked and was normal. CT showed prominent prostate and sigmoid colon wall thickening. He has a GI appt next week. CEA slightly elevated at 3.9. Since no obvious cancer will perform thrombophilia workup. He now reports a "1-2 year" history of intermittent hemoptysis. Since he has smoked since age 14 will refer him to pulmonary for bronchoscopy;.   Past Medical History: History reviewed. No pertinent past medical history.    Past Surgical HIstory:   Past Surgical History:   Procedure Laterality Date    NASAL POLYP EXCISION      NECK SURGERY      PARTIAL KNEE ARTHROPLASTY Left     ROTATOR CUFF REPAIR      TYMPANOPLASTY         Family " History:   Family History   Problem Relation Age of Onset    Seizures Mother     Heart failure Father        Social History:  reports that he has been smoking cigarettes. He started smoking about 43 years ago. He has a 21.9 pack-year smoking history. He has never used smokeless tobacco. He reports that he does not drink alcohol and does not use drugs.    Allergies:  Review of patient's allergies indicates:   Allergen Reactions    Buprenorphine-naloxone     Tylox [oxycodone-acetaminophen]        Medications:  Current Outpatient Medications   Medication Sig Dispense Refill    amitriptyline (ELAVIL) 75 MG tablet Take 75 mg by mouth 2 (two) times daily as needed.      citalopram (CELEXA) 20 MG tablet Take 1 tablet by mouth once daily.      fluticasone propionate (FLONASE) 50 mcg/actuation nasal spray 1 spray by Each Nostril route daily as needed.      HYDROcodone-acetaminophen (NORCO)  mg per tablet Take 1 tablet by mouth every 6 (six) hours as needed.      ipratropium (ATROVENT) 21 mcg (0.03 %) nasal spray SPRAY TWO SPRAYS IN EACH NOSTRIL THREE TIMES DAILY AS NEEDED FOR RUNNY NOSE      metFORMIN (GLUCOPHAGE) 1000 MG tablet       sumatriptan (IMITREX) 50 MG tablet Take 1 tablet by mouth as needed.      tamsulosin (FLOMAX) 0.4 mg Cap       temazepam (RESTORIL) 30 mg capsule Take 30 mg by mouth every evening.      tiZANidine (ZANAFLEX) 4 MG tablet       TRULICITY 0.75 mg/0.5 mL pen injector Inject 0.75 mg SUBCUTANEOUSLY ONCE EVERY WEEK       No current facility-administered medications for this visit.       Review of Systems   Except as documented all systems reviewed and negative  Review of Systems   Constitutional: Negative.   HENT: Negative.   Eyes: Negative.   Respiratory: Negative. Negative for shortness of breath.   Cardiovascular: Negative.   Gastrointestinal: Negative.   Genitourinary: Negative.   Musculoskeletal: Negative.   Skin: Negative.   Neurological: Negative.   Endo/Heme/Allergies: Negative.  "  Psychiatric/Behavioral: Negative.   ECOG Performance Status:   ECOG SCORE             Objective:      Vitals:   Vitals:    10/03/23 1415   BP: 130/79   BP Location: Right arm   Patient Position: Sitting   Pulse: 81   Resp: 19   Temp: 98.1 °F (36.7 °C)   TempSrc: Oral   SpO2: 99%   Weight: 127.5 kg (281 lb)   Height: 6' 2" (1.88 m)       BMI: Body mass index is 36.08 kg/m².    Physical Exam  Physical Exam  Vitals and nursing note reviewed.   Constitutional:   General: He is not in acute distress.  Appearance: Normal appearance. He is morbidly obese. He is not toxic-appearing.   HENT:   Head: Normocephalic and atraumatic.   Nose: No rhinorrhea.   Mouth/Throat:   Mouth: Mucous membranes are moist.   Eyes:   Pupils: Pupils are equal, round, and reactive to light.   Cardiovascular:   Rate and Rhythm: Normal rate and regular rhythm.   Pulses: Normal pulses.   Heart sounds: Normal heart sounds. No murmur heard.  Pulmonary:   Effort: Pulmonary effort is normal. No respiratory distress.   Breath sounds: Normal breath sounds. No wheezing.   Abdominal:   General: Bowel sounds are normal. There is no distension.   Palpations: Abdomen is soft.   Tenderness: There is no abdominal tenderness. There is no guarding.   Musculoskeletal:   Cervical back: Neck supple.   Right lower leg: No edema.   Left lower leg: No edema.   Skin:  General: Skin is warm and dry.   Neurological:   General: No focal deficit present.   Mental Status: He is alert and oriented to person, place, and time. Mental status is at baseline.   Psychiatric:   Mood and Affect: Mood is anxious.   Behavior: Behavior is cooperative.     Laboratory Data:  No visits with results within 1 Week(s) from this visit.   Latest known visit with results is:   Lab Visit on 09/22/2023   Component Date Value Ref Range Status    [place] Generic Orderable 09/22/2023 SEE COMMENTS   Final    Comment:    Test                                    Result    Flag  Unit  " RefValue  ----------------------------------------------------------------------  JAK2 V617F Mutation Detection, B    JAK2 Result                           see interpretation    JAK2 V617F Mutation Detection, B      SEE COMMENTS  Peripheral blood, JAK2 V617F mutation analysis:     Negative for JAK2 V617F.     A negative [id number] test result does not exclude the   possibility of a myeloproliferative neoplasm (MPN). If   clinical suspicion is high for primary myelofibrosis (PMF)   or essential thrombocythemia (ET), consider additional   testing for CALR with reflex to MPL (test ID: MPNCM). If   clinical suspicion is high for polycythemia vera, the test   JAK2 Exon 12 and Other Non-V617F Mutational Detection (test   ID: JAKXB or JAKXM) could be considered. Clinicopathologic   correlation is recommended for a definitive diagnosis.     Signing Pathologist: Patrick Choi D.O., M.S.     -------------------ADDITIONAL                            INFORMATION-------------------  Method summary - JAK2 V617F analysis:  Quantitative,   allele-specific polymerase chain reaction (PCR) assay was   performed using extracted genomic DNA to evaluate for the   point mutation causing JAK2 V617F.  The analytic   sensitivity of this assay has been determined at 0.06% (see   [facility] Interpretive Handbook for method   details).  This test was developed and its performance characteristics   determined by [facility] in a manner consistent with CLIA   requirements. This test has not been cleared or approved by   the U.S. Food and Drug Administration.     Test Performed by:  [facility] - [address] [address]  : Narayan Reyes M.D. Ph.D.; CLIA# [id number]    [place] Generic Orderable 09/22/2023 SEE COMMENTS   Final    Comment:    Test                                   Result     Flag  Unit   RefValue  ----------------------------------------------------------------------  Prothrombin [id number] Mutation, B        Negative               Negative    PTNT Interpretation                  SEE COMMENTS  This individual DOES NOT have the Prothrombin F2 c.*97G>A   (legacy numbering [id number]) variant. Although the   Prothrombin (F2 c.*97G>A) variant is absent, this   individual may have other genetic and environmental risk   factors for thrombosis. If indicated, consider genetic   consultation and counseling for this individual and   potentially affected family members regarding laboratory   testing.     -------------------ADDITIONAL INFORMATION-------------------  This test uses TaqMan Genotyping chemistry to amplify and   detect specific single nucleotide polymorphisms in purified   genomic DNA.     DISCLAIMER  Patients receiving allogenic stem cell transplants prior to   having blood drawn for DNA based testing may                            have false   normal or abnormal results depending on the genotype of the   stem cell donor.  This test was developed and its performance characteristics   determined by [facility] in a manner consistent with CLIA   requirements. This test has not been cleared or approved by   the U.S. Food and Drug Administration.    PTNT Reviewed By                     SEE COMMENTS  RESULT: JAJA Doss     Test Performed by:  [address] [address]  : Narayan Reyes M.D. Ph.D.; CLIA# [id number]    [place] Generic Orderable 09/22/2023 SEE COMMENTS   Final       Test                             Result           Flag  Unit  RefValue  ----------------------------------------------------------------------  Protein C Ag, P                  119                    %         A normal protein C (PC) antigen does not exclude a   functional PC deficiency. If indicated consider PC activity   assay.      -------------------ADDITIONAL INFORMATION-------------------  This test has been modified from the 's   instructions. Its performance characteristics were   determined by [facility] in a manner consistent with CLIA   requirements. This test has not been cleared or approved by   the U.S. Food and Drug Administration.     Test Performed by:  [place] [address]  : Narayan Reyes M.D. Ph.D.; CLIA# [id number]    Factor V Leiden (R506Q) Mutation 09/22/2023 Negative  Negative Final    F5DNA Interpretation 09/22/2023 SEE COMMENTS   Final    Comment: This individual DOES NOT have the factor V Leiden F5   c.1601G>A; p.Son603Gye (legacy numbering [id number])   variant. Although the factor V Leiden variant is absent,   the individual may have other genetic and environmental   risk factors for venous thromboembolism. If indicated,   consider genetic consultation and counseling for this   individual and family members regarding laboratory testing.     -------------------ADDITIONAL INFORMATION-------------------  This test uses TaqMan Genotyping chemistry to amplify and   detect specific single nucleotide polymorphisms in purified   genomic DNA.     DISCLAIMER  Discrepancy between the activated protein C resistance   assay and the DNA based F5 c.1601 G>A. p.Syd020Bzm assay   may be observed in patients receiving allogenic stem cell   transplants or liver transplants.  This test was developed and its performance characteristics   determined by [facility] in a manner consistent with CLIA   requirements. This test has not been cleared or                            approved by   the U.S. Food and Drug Administration.    F5DNA Reviewed By 09/22/2023 SEE COMMENTS   Final    RESULT: JAJA Doss     Test Performed by:  [place] [address]  :  Narayan Reyes M.D. Ph.D.; CLIA# [id number]    Antithrombin Antigen, P 2023 84  80 - 120 % Final       -------------------ADDITIONAL INFORMATION-------------------  This test has been modified from the 's   instructions. Its performance characteristics were   determined by [facility] in a manner consistent with   CLIA requirements. This test has not been cleared or   approved by the U.S. Food and Drug Administration.     Test Performed by:  [facility] - [address] [address]  : Narayan Reyes M.D. Ph.D.; CLIA# [id number]    [place] Generic Orderable 2023 SEE COMMENTS   Final       Test                             Result           Flag  Unit  RefValue  ----------------------------------------------------------------------  Protein C Ag, P                  140                    %         A normal protein C (PC) antigen does not exclude a   functional PC deficiency. If indicated consider PC activity   assay.     -------------------ADDITIONAL INFORMATION-------------------  This test has been modified from the 's   instructions. Its performance characteristics were   determined by [facility] in a manner consistent with CLIA   requirements. This test has not been cleared or approved by   the U.S. Food and Drug Administration.     Test Performed by:  [facility] - [address] [address]  : Narayan Reyes M.D. Ph.D.; CLIA# [id number]         Imagin2023 CT Angiogram Pulmonary  1. Multifocal pulmonary thromboembolism with most proximal emboli lobar. There is no evidence of right heart strain. 2. Lungs remain clear. 3. Cholelithiasis. 4. Esophageal features presumably due to GERD. 5. Hepatosteatosis. Electronically signed by: Mike Mills DO on 2023 05:49:28 AM US/Eastern Per PQRS, all CT exams are performed using one or more of  the following dose reduction techniques: automated exposure control, adjustment of the mA and/or kV according to patient size, or use of iterative reconstruction technique. EXAM: CTA Chest, with Contrast.    6/6/23 CT Cervical Spine without Contrast  1. CT imaging shows no acute intracranial abnormality. 2. Left maxillary sinus disease in this patient with prior ESS. CT CERVICAL SPINE FINDINGS: There is no evidence of fracture. Anterior discectomy and fusion (ACDF) is seen at C4-C5 with stand-alone implanted device. ACDF also is present at C5-C6 with anterior plate with screw fixation. Small anterior marginal osteophyte formation is noted at C3-C4. There is no malalignment. Prevertebral and retropharyngeal soft tissues are normal. Lung apices are clear. IMPRESSION: 1. CT of the cervical spine shows no acute osseous abnormality. 2. Operative changes of C4-C6 ACDF.   6/6/23 CT Head without Contrast  1. CT imaging shows no acute intracranial abnormality. 2. Left maxillary sinus disease in this patient with prior ESS. CT CERVICAL SPINE FINDINGS: There is no evidence of fracture. Anterior discectomy and fusion (ACDF) is seen at C4-C5 with stand-alone implanted device. ACDF also is present at C5-C6 with anterior plate with screw fixation. Small anterior marginal osteophyte formation is noted at C3-C4. There is no malalignment. Prevertebral and retropharyngeal soft tissues are normal. Lung apices are clear. IMPRESSION: 1. CT of the cervical spine shows no acute osseous abnormality. 2. Operative changes of C4-C6 ACDF. 6/6/23 CT Lumbar Spine without Contrast  1. CT shows no acute osseous abnormality within the thoracic spine. 2. Mild degenerative spondylosis. 3. Esophageal features suggesting GERD. CT LUMBAR SPINE FINDINGS: Lumbar vertebral body stature is maintained. Anterior cortical margins are smooth. Operative changes are noted with bilateral intrapedicular screws in L3, L4 and L5 with anchoring vertical rods. Spinous  processes have been resected at L3 and L5 with laminectomy of L4. Central canal is poorly evaluated without intrathecal contrast. However, there is severe osseous spinal stenosis at L2-L3. Minimal atherosclerotic calcified plaquing in the abdominal aorta is noted. Imaging continued through the sacroiliac joints which show no acute pathology. Innumerable diverticula in the sigmoid are seen. IMPRESSION: 1. Severe osseous spinal stenosis at L2-L3. 2. Posterior orthopedic fusion spanning L3 through L5. 3. Sigmoidal diverticulosis. :     6/6/23 CT Thoracic Spine without Contrast  1. CT shows no acute osseous abnormality within the thoracic spine. 2. Mild degenerative spondylosis. 3. Esophageal features suggesting GERD. CT LUMBAR SPINE FINDINGS: Lumbar vertebral body stature is maintained. Anterior cortical margins are smooth. Operative changes are noted with bilateral intrapedicular screws in L3, L4 and L5 with anchoring vertical rods. Spinous processes have been resected at L3 and L5 with laminectomy of L4. Central canal is poorly evaluated without intrathecal contrast. However, there is severe osseous spinal stenosis at L2-L3. Minimal atherosclerotic calcified plaquing in the abdominal aorta is noted. Imaging continued through the sacroiliac joints which show no acute pathology. Innumerable diverticula in the sigmoid are seen. IMPRESSION: 1. Severe osseous spinal stenosis at L2-L3. 2. Posterior orthopedic fusion spanning L3 through L5. 3. Sigmoidal diverticulosis.     7/24/23 CT AP:  1. Mild mucosal prominence, edema, and diverticula at the junction of the descending sigmoid colon suggesting a mild acute diverticulitis borderline hepatomegaly  2. Thoracolumbar spondylosis with postsurgical changes at the lumbar spine and stabilization hardware.  There is suspect moderate to severe encroachment into the central spinal canal and to a lesser extent the neural foramina at the upper and mid lumbar spine.  3. Mild mucosal  prominence at the lower esophagus  4. Borderline prostatomegaly with mass effect on the posteroinferior bladder margin.  Underlying pathology must be considered  5. Cholelithiasis without evidence of acute cholecystitis     Assessment:     1. Acute pulmonary embolism, unspecified pulmonary embolism type, unspecified whether acute cor pulmonale present    2. DVT of deep femoral vein, left    3. Coughing up blood    4. Bright red blood per rectum      Myeloma work up is negative  CT AP shows borderine prostamegaly with mass effect and mucosal prominence edema and diverticula at the junction of descending sigmoid colon. Since no obvious cancer will perform thrombophilia work up but will refer for colonoscipy; PSA by Dr. Ford was normal at <1..  CEA is slightly elevated at 3.9.to see Dr Tomlin at [facility] on 9/26/23 for GI evaluation.  Plan:     Problem List Items Addressed This Visit    None  Visit Diagnoses       Acute pulmonary embolism, unspecified pulmonary embolism type, unspecified whether acute cor pulmonale present    -  Primary    DVT of deep femoral vein, left        Coughing up blood        Bright red blood per rectum              Follow up with gastroenterology on 9/26/23 for colonosocpy; note abnormal CT and slightly elevated CEA  PSA by Dr. Ford was normal  Referred to pulmonary for an at least a year history of hemoptysis and has been smoking since age 13 yo; I also suggested he follow up with his ENT   He is supposed to have surgery on his neck by Dr. Jin Demarco in [place]; if it is before early December we will bill to discuss blood thinners. He has to be cleared by cardiology first.  Repeat left venous NIVA for ongoing swelling was done today and reveals no thrombosis  RTC late November to discuss discontinuation of his blood thinners as that will complete 6 months of therapy and his thrombophilia workup is normal. His oncologic workup is ongoing.

## 2023-10-04 ENCOUNTER — DOCUMENTATION ONLY (OUTPATIENT)
Dept: HEMATOLOGY/ONCOLOGY | Facility: CLINIC | Age: 53
End: 2023-10-04
Payer: MEDICARE

## 2023-10-04 NOTE — PROGRESS NOTES
Dr. George's office called regarding patient trying to get his appointment schedule earlier.They informed me that patient had an appointment schedule for 09/26/2023, but cancel. Patient was informed that they are not making appointment currently, but if someone cancel they will call. Patient verbalized understanding of instruction.

## 2023-10-22 ENCOUNTER — HOSPITAL ENCOUNTER (OUTPATIENT)
Dept: TELEMEDICINE | Facility: HOSPITAL | Age: 53
Discharge: HOME | End: 2023-10-22
Payer: MEDICARE

## 2023-10-22 PROCEDURE — 99447 NTRPROF PH1/NTRNET/EHR 11-20: CPT | Mod: ,,, | Performed by: STUDENT IN AN ORGANIZED HEALTH CARE EDUCATION/TRAINING PROGRAM

## 2023-10-22 PROCEDURE — 99447 PR INTERPROF, PHONE/INTERNET/EHR, CONSULT, 11-20 MINS: ICD-10-PCS | Mod: ,,, | Performed by: STUDENT IN AN ORGANIZED HEALTH CARE EDUCATION/TRAINING PROGRAM

## 2023-10-22 NOTE — CONSULTS
[facility]  Vascular Neurology  Comprehensive Stroke Center  TeleVascular Neurology Interprofessional Consult Note    Telestroke Consultation converted to phone consult due to Case characteristics such as timeline or symptoms . Discussed w/ HARPER DE LA TORRE at [facility] ED over via phone through [facility].    53 yo w/ PMHx of DVT/PE on Eliquis, Hx of TIA who presents w/ subjective RSW and Numbness w/ LKW at 22:00 the night PTA.   NIHSS 1 as per ER physician.   Not a TNK candidate based on LKW and full dose AC use.     Recommend admission for stroke w/u.   Eliquis can be continued as long as no ICH noted on CTH.     Recommendations:  CTA Head & Neck ASAP to evaluate cervico-cephalic vasculature for high risk culprit vessel disease or large/medium vessel occlusion  MRI brain to evaluate for presence and/or extent of ischemic injury  Allow for permissive HTNsion up to 220/110 until AIS is r/o w/ imaging   Lipid profile, A1c, TSH  ECHO w/ bubble study  PT/OT/SLP eval and Rx  IVF to allow for maximum cerebral perfusion  HOB flat   High intensity statin for LDL goal <70 long term     Imaging personally interpreted:  CT Brain: Not able to be seen because not performed or no images available for view at time of consult due to technical failure    I spent approximately 11 minutes on this encounter which includes chart review, imaging interpretation if available, medical decision making including triage and planning for diagnostics, management and disposition. More than half of that time was spent communicating with the consulting provider and coordinating patient care.    Felicia Cruz MD  Vascular Neurology  [facility]    This encounter was conducted as an interprofessional communication between providers at the spoke and vascular neurologist. The interaction was completed over the phone or via secure messaging (electronic medical record - Epic Secure  Chat).    Once this note was completed, a written copy was sent back to the provider via fax or electronic medical record.

## 2023-10-27 NOTE — H&P (VIEW-ONLY)
"Subjective:       Patient ID: Aram Thurston is a 52 y.o. male.    Chief Complaint: Referral (Referred by Dr. Le Hutton), Cough (dry), Nausea, Shortness of Breath, and Fatigue      HPI 52-year-old recently diagnosed with DVT/PEs and now on anticoagulation therapy referred for hemoptysis.  Patient has multiple complaints on his visit today including difficulty swallowing, nausea, shortness of breath and fatigue.  He reports having asthma all his life.  Smoked up to 2 pack per day but now down to 5 or 6 cigarettes a day.  Has had intermittent hemoptysis for over a year.  Usually less than a tsp each time.  Most of the time it is mixed with mucus.  Patient had recent colonoscopy that showed 2 polyps but otherwise clear.  Patient also recently hospitalized for TIA.    Review of Systems   Constitutional:  Positive for malaise/fatigue.   HENT:  Positive for congestion and sinus pain. Negative for nosebleeds.         Had previous surgery for nasal polyps   Respiratory:  Positive for cough, hemoptysis, sputum production and shortness of breath.    Cardiovascular:  Positive for leg swelling.   Gastrointestinal:  Positive for blood in stool and nausea.   Musculoskeletal:  Positive for myalgias and neck pain.   Neurological:  Positive for dizziness and weakness.        Social History     Socioeconomic History    Marital status:    Tobacco Use    Smoking status: Every Day     Current packs/day: 0.50     Average packs/day: 0.5 packs/day for 43.8 years (21.9 ttl pk-yrs)     Types: Cigarettes     Start date: 1980    Smokeless tobacco: Never   Substance and Sexual Activity    Alcohol use: Never    Drug use: Never           10/27/2023     9:31 AM 10/3/2023     2:15 PM 9/22/2023    10:30 AM 7/31/2023     9:07 AM 7/24/2023    11:19 AM 5/24/2018    10:00 AM 2/22/2018    10:43 AM   Pulmonary Studies Review   SpO2 98 % 99 % 98 % 97 % 99 %     Height 6' 2" (1.88 m) 6' 2" (1.88 m) 6' 2.02" (1.88 m) 6' 2" (1.88 m) 6' " "2" (1.88 m) 6' 2.41" (1.89 m) 6' 2.8" (1.9 m)   Weight 125.2 kg (276 lb) 127.5 kg (281 lb) 129.1 kg (284 lb 9.6 oz) 125.2 kg (276 lb)  112.5 kg (247 lb 15.9 oz) 112.5 kg (247 lb 15.9 oz)   BMI (Calculated) 35.4 36.1 36.5 35.4  31.5 31.2   Predicted Distance 434.47 430.54 428.3 434.47 633.06 463.29 464.97   Predicted Distance Meters (Calculated) 637.8 meters 633.81 meters 630.93 meters 637.8 meters  672.75 meters 680.32 meters           Objective:   /74 (BP Location: Right arm)   Pulse 60   Temp 98.1 °F (36.7 °C)   Resp 16   Ht 6' 2" (1.88 m)   Wt 125.2 kg (276 lb)   SpO2 98%   BMI 35.44 kg/m²     Physical Exam    General-obese middle-aged man no distress  HEENT-conjunctiva pink sclerae nonicteric.  Oropharynx clear  Neck-no adenopathy or thyromegaly  Chest-clear breath sounds bilaterally with no wheezes rales or rhonchi heard  CV-regular rhythm  Abdomen-obese.  Bowel sounds present nondistended  Extremities-1+ pretibial edema.  No peripheral cyanosis or clubbing  Neuro-alert and oriented with no focal deficits found    CTA chest by my review shows small peripheral pulmonary emboli.  No parenchymal disease seen.  Airways appear clear.  Assessment:     Hemoptysis possibly related to chronic bronchitis and recent initiation of anticoagulation although patient reports having hemoptysis off and on for over a year.  Tobacco abuse-active smoker  Obesity  Recent DVT/pulmonary emboli now on anticoagulation therapy    Plan:     Schedule patient for bronchoscopy next week for airway inspection.  Agree with anticoagulation therapy.  Smoking cessation discussed.    TAYLER George MD    "

## 2023-10-30 ENCOUNTER — ANESTHESIA EVENT (OUTPATIENT)
Dept: ENDOSCOPY | Facility: HOSPITAL | Age: 53
End: 2023-10-30
Payer: MEDICARE

## 2023-10-30 RX ORDER — ALLOPURINOL 300 MG/1
300 TABLET ORAL DAILY PRN
COMMUNITY
Start: 2023-06-01

## 2023-10-30 RX ORDER — ONDANSETRON 4 MG/1
4 TABLET, ORALLY DISINTEGRATING ORAL EVERY 8 HOURS PRN
COMMUNITY
Start: 2023-10-26

## 2023-10-30 RX ORDER — ATORVASTATIN CALCIUM 40 MG/1
40 TABLET, FILM COATED ORAL DAILY
COMMUNITY
Start: 2023-10-23

## 2023-10-30 RX ORDER — ASPIRIN 325 MG
325 TABLET ORAL DAILY
COMMUNITY

## 2023-10-30 RX ORDER — HYDROCORTISONE 25 MG/G
CREAM TOPICAL 3 TIMES DAILY PRN
COMMUNITY
Start: 2023-10-11

## 2023-10-31 NOTE — PRE-PROCEDURE INSTRUCTIONS
[facility]: Outpatient Surgery  Preprocedure Check-In Instructions     Your arrival time for your surgery or procedure is ___9___.  We ask patients to arrive about 2 hours before surgery to allow for enough time to review your health history & medications, start your IV, complete any outstanding labwork or tests, and meet your Anesthesiologist.  You will arrive at [facility], [address] [place] [address].  Enter through the [place] entrance next to the Emergency Room, and come to the 6th floor to the Outpatient Surgery Department.     Visitory Policy:  You are allowed 2 adult visitors to be with you in the hospital. All hospital visitors should be in good current health.  No small children.     What to Bring:  Please have your ID, insurance cards, and all home medication bottles with you at check in.  Bring your CPAP machine if one is used at home.     Fasting:  Nothing to eat or drink after midnight the night before your procedure. This includes no ice, gum, hard candies, and/or tobacco products.  Follow your doctor's instructions for taking any medications on the morning of your procedure.  If no instructions for taking medications were given, do not take any medications but bring your medications in their bottles to your procedure check in.     Follow your doctor's preoperative instructions regarding skin prep, bowel prep, bathing, or showering prior to your procedure.  If any special soaps were provided to you, please use according to your doctor's instructions. If no instructions were given from your doctor, take a good bath or shower with antibacterial soap the night before and the morning of your procedure.  On the morning of procedure, wear loose, comfortable clothing.  No lotions, makeup, perfumes, colognes, deodorant, or jewelry to your procedure.  Removable items (glasses, contact lenses, dentures, retainers, hearing aids) need to be removed for your procedure.   Bring your storage containers for these items if you wear them.     Artificial nails, body jewelry, eyelash extensions, and/or hair extensions with metal clips are not allowed during your surgery.  If you currently wear any of these items, please arrange for them to be removed prior to your arrival to the hospital.     Outpatient or Same Day Surgeries:  Any patients receiving sedation/anesthesia are advised not to drive for 24 hours after their procedure.  We do not allow patients to drive themselves home after discharge.  If you are going home after your procedure, please have someone available to drive you home from the hospital.        You may call the Outpatient Surgery Department at [phone] with any questions or concerns.  We are looking forward to meeting you and taking great care of you for your procedure.  Thank you for choosing [facility] for your surgical needs.

## 2023-11-01 ENCOUNTER — ANESTHESIA (OUTPATIENT)
Dept: ENDOSCOPY | Facility: HOSPITAL | Age: 53
End: 2023-11-01
Payer: MEDICARE

## 2023-11-01 ENCOUNTER — HOSPITAL ENCOUNTER (OUTPATIENT)
Facility: HOSPITAL | Age: 53
Discharge: HOME | End: 2023-11-01
Attending: INTERNAL MEDICINE | Admitting: INTERNAL MEDICINE
Payer: MEDICARE

## 2023-11-01 VITALS
DIASTOLIC BLOOD PRESSURE: 73 MMHG | BODY MASS INDEX: 35.42 KG/M2 | HEIGHT: 74 IN | HEART RATE: 52 BPM | SYSTOLIC BLOOD PRESSURE: 110 MMHG | OXYGEN SATURATION: 98 % | TEMPERATURE: 98 F | RESPIRATION RATE: 18 BRPM | WEIGHT: 276 LBS

## 2023-11-01 DIAGNOSIS — R04.2 HEMOPTYSIS: Primary | ICD-10-CM

## 2023-11-01 LAB — POCT GLUCOSE: 81 MG/DL (ref 70–110)

## 2023-11-01 PROCEDURE — D9220A PRA ANESTHESIA: ICD-10-PCS | Mod: ANES,,, | Performed by: ANESTHESIOLOGY

## 2023-11-01 PROCEDURE — 31622 DX BRONCHOSCOPE/WASH: CPT | Performed by: INTERNAL MEDICINE

## 2023-11-01 PROCEDURE — 37000009 HC ANESTHESIA EA ADD 15 MINS: Performed by: INTERNAL MEDICINE

## 2023-11-01 PROCEDURE — 25000003 PHARM REV CODE 250: Performed by: ANESTHESIOLOGY

## 2023-11-01 PROCEDURE — D9220A PRA ANESTHESIA: ICD-10-PCS | Mod: CRNA,,, | Performed by: NURSE ANESTHETIST, CERTIFIED REGISTERED

## 2023-11-01 PROCEDURE — D9220A PRA ANESTHESIA: Mod: ANES,,, | Performed by: ANESTHESIOLOGY

## 2023-11-01 PROCEDURE — 25000003 PHARM REV CODE 250: Performed by: INTERNAL MEDICINE

## 2023-11-01 PROCEDURE — 37000008 HC ANESTHESIA 1ST 15 MINUTES: Performed by: INTERNAL MEDICINE

## 2023-11-01 PROCEDURE — 87070 CULTURE OTHR SPECIMN AEROBIC: CPT | Performed by: INTERNAL MEDICINE

## 2023-11-01 PROCEDURE — D9220A PRA ANESTHESIA: Mod: CRNA,,, | Performed by: NURSE ANESTHETIST, CERTIFIED REGISTERED

## 2023-11-01 PROCEDURE — 25000003 PHARM REV CODE 250

## 2023-11-01 PROCEDURE — 63600175 PHARM REV CODE 636 W HCPCS

## 2023-11-01 RX ORDER — LIDOCAINE HYDROCHLORIDE 40 MG/ML
4 SOLUTION TOPICAL
Status: DISCONTINUED | OUTPATIENT
Start: 2023-11-01 | End: 2023-11-01 | Stop reason: HOSPADM

## 2023-11-01 RX ORDER — LIDOCAINE HYDROCHLORIDE 20 MG/ML
INJECTION INTRAVENOUS
Status: DISCONTINUED | OUTPATIENT
Start: 2023-11-01 | End: 2023-11-01

## 2023-11-01 RX ORDER — PROPOFOL 10 MG/ML
INJECTION, EMULSION INTRAVENOUS
Status: DISCONTINUED | OUTPATIENT
Start: 2023-11-01 | End: 2023-11-01

## 2023-11-01 RX ORDER — DEXAMETHASONE SODIUM PHOSPHATE 4 MG/ML
INJECTION, SOLUTION INTRA-ARTICULAR; INTRALESIONAL; INTRAMUSCULAR; INTRAVENOUS; SOFT TISSUE
Status: DISCONTINUED | OUTPATIENT
Start: 2023-11-01 | End: 2023-11-01

## 2023-11-01 RX ORDER — ONDANSETRON 2 MG/ML
4 INJECTION INTRAMUSCULAR; INTRAVENOUS DAILY PRN
Status: CANCELLED | OUTPATIENT
Start: 2023-11-01

## 2023-11-01 RX ORDER — PROPOFOL 10 MG/ML
VIAL (ML) INTRAVENOUS CONTINUOUS PRN
Status: DISCONTINUED | OUTPATIENT
Start: 2023-11-01 | End: 2023-11-01

## 2023-11-01 RX ORDER — SODIUM CHLORIDE, SODIUM GLUCONATE, SODIUM ACETATE, POTASSIUM CHLORIDE AND MAGNESIUM CHLORIDE 30; 37; 368; 526; 502 MG/100ML; MG/100ML; MG/100ML; MG/100ML; MG/100ML
INJECTION, SOLUTION INTRAVENOUS CONTINUOUS
Status: DISCONTINUED | OUTPATIENT
Start: 2023-11-01 | End: 2023-11-01 | Stop reason: HOSPADM

## 2023-11-01 RX ORDER — SODIUM CHLORIDE, SODIUM GLUCONATE, SODIUM ACETATE, POTASSIUM CHLORIDE AND MAGNESIUM CHLORIDE 30; 37; 368; 526; 502 MG/100ML; MG/100ML; MG/100ML; MG/100ML; MG/100ML
INJECTION, SOLUTION INTRAVENOUS CONTINUOUS
Status: CANCELLED | OUTPATIENT
Start: 2023-11-01 | End: 2023-12-01

## 2023-11-01 RX ORDER — SODIUM CHLORIDE, SODIUM LACTATE, POTASSIUM CHLORIDE, CALCIUM CHLORIDE 600; 310; 30; 20 MG/100ML; MG/100ML; MG/100ML; MG/100ML
INJECTION, SOLUTION INTRAVENOUS CONTINUOUS
Status: DISCONTINUED | OUTPATIENT
Start: 2023-11-01 | End: 2023-11-01 | Stop reason: HOSPADM

## 2023-11-01 RX ORDER — ONDANSETRON 2 MG/ML
INJECTION INTRAMUSCULAR; INTRAVENOUS
Status: DISCONTINUED | OUTPATIENT
Start: 2023-11-01 | End: 2023-11-01

## 2023-11-01 RX ADMIN — PROPOFOL 50 MG: 10 INJECTION, EMULSION INTRAVENOUS at 11:11

## 2023-11-01 RX ADMIN — DEXAMETHASONE SODIUM PHOSPHATE 4 MG: 4 INJECTION, SOLUTION INTRA-ARTICULAR; INTRALESIONAL; INTRAMUSCULAR; INTRAVENOUS; SOFT TISSUE at 10:11

## 2023-11-01 RX ADMIN — ONDANSETRON 4 MG: 2 INJECTION INTRAMUSCULAR; INTRAVENOUS at 10:11

## 2023-11-01 RX ADMIN — SODIUM CHLORIDE, SODIUM GLUCONATE, SODIUM ACETATE, POTASSIUM CHLORIDE AND MAGNESIUM CHLORIDE 50 ML/HR: 526; 502; 368; 37; 30 INJECTION, SOLUTION INTRAVENOUS at 10:11

## 2023-11-01 RX ADMIN — PROPOFOL 50 MG: 10 INJECTION, EMULSION INTRAVENOUS at 10:11

## 2023-11-01 RX ADMIN — LIDOCAINE HYDROCHLORIDE 4 ML: 40 SOLUTION TOPICAL at 10:11

## 2023-11-01 RX ADMIN — PROPOFOL 150 MG: 10 INJECTION, EMULSION INTRAVENOUS at 10:11

## 2023-11-01 RX ADMIN — PROPOFOL 125 MCG/KG/MIN: 10 INJECTION, EMULSION INTRAVENOUS at 10:11

## 2023-11-01 RX ADMIN — LIDOCAINE HYDROCHLORIDE 80 MG: 20 INJECTION INTRAVENOUS at 10:11

## 2023-11-01 NOTE — TRANSFER OF CARE
"Anesthesia Transfer of Care Note    Patient: Aram Thurston    Procedure(s) Performed: Procedure(s) (LRB):  BRONCHOSCOPY, FIBEROPTIC (N/A)  IRRIGATION, BRONCHUS    Patient location: OPS    Anesthesia Type: MAC    Transport from OR: Transported from OR on room air with adequate spontaneous ventilation    Post pain: adequate analgesia    Post assessment: no apparent anesthetic complications    Post vital signs: stable    Level of consciousness: awake    Nausea/Vomiting: no nausea/vomiting    Complications: none    Transfer of care protocol was followed      Last vitals:   Visit Vitals  BP (!) 134/50 (BP Location: Left arm, Patient Position: Sitting)   Pulse 65   Temp 36.6 °C (97.9 °F) (Oral)   Resp 18   Ht 6' 2" (1.88 m)   Wt 125.2 kg (276 lb)   SpO2 96%   BMI 35.44 kg/m²     "

## 2023-11-01 NOTE — ANESTHESIA POSTPROCEDURE EVALUATION
Anesthesia Post Evaluation    Patient: Aram Thurston    Procedure(s) Performed: Procedure(s) (LRB):  BRONCHOSCOPY, FIBEROPTIC (N/A)  IRRIGATION, BRONCHUS    Final Anesthesia Type: general      Patient location during evaluation: PACU  Patient participation: Yes- Able to Participate  Level of consciousness: awake and alert  Post-procedure vital signs: reviewed and stable  Pain management: adequate  Airway patency: patent    PONV status at discharge: No PONV  Anesthetic complications: no      Cardiovascular status: hemodynamically stable  Respiratory status: unassisted  Hydration status: euvolemic  Follow-up not needed.          Vitals Value Taken Time   /50 11/01/23 1131   Temp ** 11/01/23 1231   Pulse 65 11/01/23 1131   Resp 18 11/01/23 1128   SpO2 96 % 11/01/23 1131         No case tracking events are documented in the log.      Pain/Jessy Score: Jessy Score: 10 (11/1/2023 11:32 AM)

## 2023-11-01 NOTE — DISCHARGE INSTRUCTIONS
-Your physician will be in contact with you regarding your results and if any follow up instructions are needed.    - NO driving and NO alcohol consumption for 24 hours and while taking narcotic pain medication.    - Mild cough and/or sore throat is expected. Red streaks may be present in mucus. Hard candies, peppermints, throat lozenges, gargling with warm water and salt may help    - Although no incisions were made monitor for signs of infection such as fever or chills.    - Report to your nearest ER and/or call your doctor if you experience any SUDDEN/SEVERE chest/abdominal pain, weakness, trouble breathing, uncontrolled pain, coughing up more than 2oz of blood.
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
## 2023-11-01 NOTE — ANESTHESIA PROCEDURE NOTES
Intubation    Date/Time: 11/1/2023 11:00 AM    Performed by: Mariaa Garcia  Authorized by: Vanesa Garay MD    Intubation:     Induction:  Intravenous    Intubated:  Postinduction    Mask Ventilation:  N/a    Attempts:  1    Attempted By:  Student    Difficult Airway Encountered?: No      Complications:  None    Airway Device:  Supraglottic airway/LMA    Airway Device Size:  5.0    Style/Cuff Inflation:  Uncuffed    Secured at:  The lips    Placement Verified By:  Capnometry    Complicating Factors:  None    Findings Post-Intubation:  BS equal bilateral       Patient called stating he has been experiencing side effects from medication Metformin such as severe nausea. Patient states he eats and vomits right after. Please advise.

## 2023-11-05 LAB
BACTERIA ASPIRATE CULT: NORMAL
GRAM STN SPEC: NORMAL
GRAM STN SPEC: NORMAL

## 2023-11-06 DIAGNOSIS — I82.412 DVT OF DEEP FEMORAL VEIN, LEFT: Primary | ICD-10-CM

## 2023-11-15 ENCOUNTER — OFFICE VISIT (OUTPATIENT)
Dept: HEMATOLOGY/ONCOLOGY | Facility: CLINIC | Age: 53
End: 2023-11-15
Payer: MEDICARE

## 2023-11-15 VITALS
HEIGHT: 74 IN | WEIGHT: 279.63 LBS | BODY MASS INDEX: 35.89 KG/M2 | TEMPERATURE: 98 F | DIASTOLIC BLOOD PRESSURE: 79 MMHG | HEART RATE: 66 BPM | OXYGEN SATURATION: 95 % | SYSTOLIC BLOOD PRESSURE: 122 MMHG

## 2023-11-15 DIAGNOSIS — I82.412 DVT OF DEEP FEMORAL VEIN, LEFT: Primary | ICD-10-CM

## 2023-11-15 PROCEDURE — 3074F SYST BP LT 130 MM HG: CPT | Mod: CPTII,S$GLB,, | Performed by: INTERNAL MEDICINE

## 2023-11-15 PROCEDURE — 99214 PR OFFICE/OUTPT VISIT, EST, LEVL IV, 30-39 MIN: ICD-10-PCS | Mod: S$GLB,,, | Performed by: INTERNAL MEDICINE

## 2023-11-15 PROCEDURE — 1159F MED LIST DOCD IN RCRD: CPT | Mod: CPTII,S$GLB,, | Performed by: INTERNAL MEDICINE

## 2023-11-15 PROCEDURE — 3078F DIAST BP <80 MM HG: CPT | Mod: CPTII,S$GLB,, | Performed by: INTERNAL MEDICINE

## 2023-11-15 PROCEDURE — 99999 PR PBB SHADOW E&M-EST. PATIENT-LVL IV: ICD-10-PCS | Mod: PBBFAC,,, | Performed by: INTERNAL MEDICINE

## 2023-11-15 PROCEDURE — 3078F PR MOST RECENT DIASTOLIC BLOOD PRESSURE < 80 MM HG: ICD-10-PCS | Mod: CPTII,S$GLB,, | Performed by: INTERNAL MEDICINE

## 2023-11-15 PROCEDURE — 3074F PR MOST RECENT SYSTOLIC BLOOD PRESSURE < 130 MM HG: ICD-10-PCS | Mod: CPTII,S$GLB,, | Performed by: INTERNAL MEDICINE

## 2023-11-15 PROCEDURE — 3008F PR BODY MASS INDEX (BMI) DOCUMENTED: ICD-10-PCS | Mod: CPTII,S$GLB,, | Performed by: INTERNAL MEDICINE

## 2023-11-15 PROCEDURE — 3008F BODY MASS INDEX DOCD: CPT | Mod: CPTII,S$GLB,, | Performed by: INTERNAL MEDICINE

## 2023-11-15 PROCEDURE — 99214 OFFICE O/P EST MOD 30 MIN: CPT | Mod: S$GLB,,, | Performed by: INTERNAL MEDICINE

## 2023-11-15 PROCEDURE — 99999 PR PBB SHADOW E&M-EST. PATIENT-LVL IV: CPT | Mod: PBBFAC,,, | Performed by: INTERNAL MEDICINE

## 2023-11-15 PROCEDURE — 1159F PR MEDICATION LIST DOCUMENTED IN MEDICAL RECORD: ICD-10-PCS | Mod: CPTII,S$GLB,, | Performed by: INTERNAL MEDICINE

## 2023-11-15 RX ORDER — ASPIRIN 81 MG/1
81 TABLET ORAL DAILY
COMMUNITY

## 2023-11-15 NOTE — PROGRESS NOTES
PATIENT: Aram Thurston  MRN: [id number]  DATE: 11/15/2023        Chief Complaint: DVT/PE (Pt reports bilateral hand tremors x 4 weeks.)      Oncologic History:      History Left DVT and Pulmonary Emboli dx 6/7/23    Treatment Heparin--> Eliquis--> Savaysa 60mg BID due to cost of Eliquis.    Pathology     Patient is a 52 year old male th a history of asthma who presented to ED at [facility] with LLE swelling/numbness over past 4 days with SOB. Work up was notable for multifocal pulmonary thromboembolism with most proximal emboli lobar, no heart strain. LLE US noted extensive occlusive thrombus in the left proximal great saphenous vein and extending from the left proximal superficial femoral vein into the left calf. He was admitted for acute PE, DVT. Placed on full dose lovenox. He did well symptoms improved on room air. He was transitioned to Eliquis and discharged home. Follow up with PCP within 1 week (may need referral to ortho/neurosurgery for spinal stenosis). At the time of discharged he was referred to the [facility] colorectal group.   AT his initial evaluation here in July he reported a 30 pound weight loss in the Spring; therefore concern for underlying malignancy. CT CAP ordered. He has an appt soon with GI for endoscopy  AT his follow up visit on 923/23 he reported hemoptysis for a year or two. He is now Savaysa 60mg and he is now on Turlicity once a week.   He has seen Dr. Serna ENT in [place] for nasal polyps.   He has herniated discs in his neck and there are plans to re-do surgery in his neck through Dr. Demarco in [facility].     Final Medication List   amitriptyline 10 mg tabletCommonly known as: [id number] mg, Oral, Nightly    Eliquis DVT-PE Treat 30D Start DspkGeneric drug: apixabanTake 2 tablets by mouth twice daily for 7 days, then take 1 tablet by mouth twice daily thereafter.    HYDROcodone-acetaminophen  mg per tablet    LABS:  06/08/23:  WBC 10.0, HGB 15.6, MCV 88, PLT 189K, CMP WNL except  "glucose 236  BNP 11*   INR 1.0 /PTT 29  7/24/23 beta 2 glycoproteins and cardiolipin negative.   9/22/23 JAK2 V617f negative, Prothrombin gene muation negative, Factor V leiden negative, Protein S and C and ATIII antigens WNL.   Bronch with Dr. George 11/2023:   There were areas of mild mucosal inflammation but no blood was found within the airway and no endobronchial lesion was seen.     Past Surgical History:   Procedure Laterality Date   BACK SURGERY   JOINT REPLACEMENT   NECK SURGERY     Family history: Cousin and father MI before age 50; FH reviewed and non-contributory to this present illness  Social History     Tobacco Use   Smoking status: Every Day   Packs/day: 0.50   Types: Cigarettes   Smokeless tobacco: Never   Alcohol use: Not Currently     Allergies   Allergen Reactions   Tylox [Oxycodone-Acetaminophen] Shortness Of Breath     Subjective:   Interval History: Mr. Thurston is a 52 y.o. male who returns for new evaluation and treatment of. DVT and Pulmonary emboi associated with 30 pound weight loss.   11/15/23 Had Bronch ? Results. Left lower extremity DVT has resolved  10/3/23 has colonoscopy on 10/11/23; had 2 polyps. He did hear from pulmonary but no appointment yet. They are 2 months out. He is now on Trulicity; he has but some of the weight back on. He does not have much of an appetite but is forcing himself to eat. He has BRBPR and hemoptysis. He has hip pain    9/22/23:  anticardiolipin and myeloma work up negative. He saw Dr. Ford who drained a testicular abscess; then one appeared on the other side. PSA was checked and was normal. CT showed prominent prostate and sigmoid colon wall thickening. He has a GI appt next week. CEA slightly elevated at 3.9. Since no obvious cancer will perform thrombophilia workup. He now reports a "1-2 year" history of intermittent hemoptysis. Since he has smoked since age 14 will refer him to pulmonary for bronchoscopy;.   Past Medical History:   Past Medical " History:   Diagnosis Date    Anxiety     Arthritis     Asthma     Back pain     Balance problems     Cervical herniated disc     Depression     Diabetes mellitus     Dry cough     DVT (deep venous thrombosis)     Enlarged prostate     Gout     Hemoptysis     HLD (hyperlipidemia)     Insomnia     Leg weakness     Lung mass     Migraines     Neck pain     Neuropathy     Obesity     PONV (postoperative nausea and vomiting)     Pulmonary embolism     Spinal stenosis     TIA (transient ischemic attack)     Use of cane as ambulatory aid     Walker as ambulation aid        Past Surgical HIstory:   Past Surgical History:   Procedure Laterality Date    ABCESS DRAINAGE      scrotal cyst    BRONCHIAL IRRIGATION  11/1/2023    Procedure: IRRIGATION, BRONCHUS;  Surgeon: ERIS George MD;  Location: [facility] BRONCHOSCOPY LAB;  Service: Pulmonary;;  RLL    CERVICAL FUSION      COLONOSCOPY W/ POLYPECTOMY  10/2023    FLEXIBLE BRONCHOSCOPY N/A 11/1/2023    Procedure: BRONCHOSCOPY, FIBEROPTIC;  Surgeon: ERIS George MD;  Location: [facility] BRONCHOSCOPY LAB;  Service: Pulmonary;  Laterality: N/A;    LUMBAR FUSION      NASAL POLYP EXCISION      PARTIAL KNEE ARTHROPLASTY Left     ROTATOR CUFF REPAIR Right     TYMPANOPLASTY Right        Family History:   Family History   Adopted: Yes   Problem Relation Age of Onset    Seizures Mother     Heart failure Father        Social History:  reports that he has been smoking cigarettes. He started smoking about 43 years ago. He has a 21.8 pack-year smoking history. He has never used smokeless tobacco. He reports current drug use. He reports that he does not drink alcohol.    Allergies:  Review of patient's allergies indicates:   Allergen Reactions    Buprenorphine-naloxone     Tylox [oxycodone-acetaminophen]     Iodinated contrast media Nausea Only    Nitrous oxide Nausea And Vomiting       Medications:  Current Outpatient Medications   Medication Sig Dispense Refill    allopurinoL (ZYLOPRIM) 300 MG  tablet Take 300 mg by mouth daily as needed.      amitriptyline (ELAVIL) 75 MG tablet Take 75 mg by mouth 2 (two) times daily as needed.      aspirin (ECOTRIN) 81 MG EC tablet Take 81 mg by mouth once daily.      aspirin 325 MG tablet Take 325 mg by mouth once daily.      atorvastatin (LIPITOR) 40 MG tablet Take 40 mg by mouth once daily.      citalopram (CELEXA) 20 MG tablet Take 1 tablet by mouth once daily.      edoxaban (SAVAYSA) 60 mg Tab tablet Take 60 mg by mouth once daily.      fluticasone propionate (FLONASE) 50 mcg/actuation nasal spray 1 spray by Each Nostril route daily as needed.      HYDROcodone-acetaminophen (NORCO)  mg per tablet Take 1 tablet by mouth every 6 (six) hours as needed.      hydrocortisone 2.5 % cream Apply topically 3 (three) times daily as needed.      ipratropium (ATROVENT) 21 mcg (0.03 %) nasal spray SPRAY TWO SPRAYS IN EACH NOSTRIL THREE TIMES DAILY AS NEEDED FOR RUNNY NOSE      metFORMIN (GLUCOPHAGE) 1000 MG tablet Take 1,000 mg by mouth 2 (two) times a day.      ondansetron (ZOFRAN-ODT) 4 MG TbDL Take 4 mg by mouth every 8 (eight) hours as needed for Nausea/Vomiting.      sumatriptan (IMITREX) 50 MG tablet Take 1 tablet by mouth daily as needed for Migraine.      tamsulosin (FLOMAX) 0.4 mg Cap Take 0.4 mg by mouth once daily.      temazepam (RESTORIL) 30 mg capsule Take 30 mg by mouth every evening.      tiZANidine (ZANAFLEX) 4 MG tablet Take 4 mg by mouth 2 (two) times a day.      TRULICITY 0.75 mg/0.5 mL pen injector Inject 0.75 mg into the skin every 7 days.       No current facility-administered medications for this visit.       Review of Systems   Except as documented all systems reviewed and negative  Review of Systems   Constitutional: Negative.   HENT: Negative.   Eyes: Negative.   Respiratory: Negative. Negative for shortness of breath.   Cardiovascular: Negative.   Gastrointestinal: Negative.   Genitourinary: Negative.   Musculoskeletal: Negative.   Skin:  "Negative.   Neurological: Negative.   Endo/Heme/Allergies: Negative.   Psychiatric/Behavioral: Negative.   ECOG Performance Status:   ECOG SCORE             Objective:      Vitals:   Vitals:    11/15/23 1240   BP: 122/79   Pulse: 66   Temp: 98 °F (36.7 °C)   SpO2: 95%   Weight: 126.8 kg (279 lb 9.6 oz)   Height: 6' 2.02" (1.88 m)         BMI: Body mass index is 35.88 kg/m².    Physical Exam  Physical Exam  Vitals and nursing note reviewed.   Constitutional:   General: He is not in acute distress.  Appearance: Normal appearance. He is morbidly obese. He is not toxic-appearing.   HENT:   Head: Normocephalic and atraumatic.   Nose: No rhinorrhea.   Mouth/Throat:   Mouth: Mucous membranes are moist.   Eyes:   Pupils: Pupils are equal, round, and reactive to light.   Cardiovascular:   Rate and Rhythm: Normal rate and regular rhythm.   Pulses: Normal pulses.   Heart sounds: Normal heart sounds. No murmur heard.  Pulmonary:   Effort: Pulmonary effort is normal. No respiratory distress.   Breath sounds: Normal breath sounds. No wheezing.   Abdominal:   General: Bowel sounds are normal. There is no distension.   Palpations: Abdomen is soft.   Tenderness: There is no abdominal tenderness. There is no guarding.   Musculoskeletal:   Cervical back: Neck supple.   Right lower leg: No edema.   Left lower leg: No edema.   Skin:  General: Skin is warm and dry.   Neurological:   General: No focal deficit present.   Mental Status: He is alert and oriented to person, place, and time. Mental status is at baseline.   Psychiatric:   Mood and Affect: Mood is anxious.   Behavior: Behavior is cooperative.     Laboratory Data:  Lab Visit on 11/15/2023   Component Date Value Ref Range Status    Sodium Level 11/15/2023 139  136 - 145 mmol/L Final    Potassium Level 11/15/2023 4.9  3.5 - 5.1 mmol/L Final    Chloride 11/15/2023 103  98 - 107 mmol/L Final    Carbon Dioxide 11/15/2023 26  22 - 29 mmol/L Final    Glucose Level 11/15/2023 173 (H)  74 " - 100 mg/dL Final    Blood Urea Nitrogen 11/15/2023 11.0  8.4 - 25.7 mg/dL Final    Creatinine 11/15/2023 0.93  0.73 - 1.18 mg/dL Final    Calcium Level Total 11/15/2023 10.0  8.4 - 10.2 mg/dL Final    Protein Total 11/15/2023 7.5  6.4 - 8.3 gm/dL Final    Albumin Level 11/15/2023 4.2  3.5 - 5.0 g/dL Final    Globulin 11/15/2023 3.3  2.4 - 3.5 gm/dL Final    Albumin/Globulin Ratio 11/15/2023 1.3  1.1 - 2.0 ratio Final    Bilirubin Total 11/15/2023 1.1  <=1.5 mg/dL Final    Alkaline Phosphatase 11/15/2023 99  40 - 150 unit/L Final    Alanine Aminotransferase 11/15/2023 35  0 - 55 unit/L Final    Aspartate Aminotransferase 11/15/2023 20  5 - 34 unit/L Final    eGFR 11/15/2023 >60  mls/min/1.73/m2 Final    WBC 11/15/2023 8.02  4.50 - 11.50 x10(3)/mcL Final    RBC 11/15/2023 5.92  4.70 - 6.10 x10(6)/mcL Final    Hgb 11/15/2023 17.8  14.0 - 18.0 g/dL Final    Hct 11/15/2023 53.6 (H)  42.0 - 52.0 % Final    MCV 11/15/2023 90.5  80.0 - 94.0 fL Final    MCH 11/15/2023 30.1  27.0 - 31.0 pg Final    MCHC 11/15/2023 33.2  33.0 - 36.0 g/dL Final    RDW 11/15/2023 13.3  11.5 - 17.0 % Final    Platelet 11/15/2023 240  130 - 400 x10(3)/mcL Final    MPV 11/15/2023 9.6  7.4 - 10.4 fL Final    Neut % 11/15/2023 66.1  % Final    Lymph % 11/15/2023 24.3  % Final    Mono % 11/15/2023 6.2  % Final    Eos % 11/15/2023 1.5  % Final    Basophil % 11/15/2023 1.5  % Final    Lymph # 11/15/2023 1.95  0.6 - 4.6 x10(3)/mcL Final    Neut # 11/15/2023 5.30  2.1 - 9.2 x10(3)/mcL Final    Mono # 11/15/2023 0.50  0.1 - 1.3 x10(3)/mcL Final    Eos # 11/15/2023 0.12  0 - 0.9 x10(3)/mcL Final    Baso # 11/15/2023 0.12  <=0.2 x10(3)/mcL Final    IG# 11/15/2023 0.03  0 - 0.04 x10(3)/mcL Final    IG% 11/15/2023 0.4  % Final         Imagin2023 CT Angiogram Pulmonary  1. Multifocal pulmonary thromboembolism with most proximal emboli lobar. There is no evidence of right heart strain. 2. Lungs remain clear. 3. Cholelithiasis. 4. Esophageal features  presumably due to GERD. 5. Hepatosteatosis. Electronically signed by: Mike Mills DO on 06/07/2023 05:49:28 AM US/Eastern Per PQRS, all CT exams are performed using one or more of the following dose reduction techniques: automated exposure control, adjustment of the mA and/or kV according to patient size, or use of iterative reconstruction technique. EXAM: CTA Chest, with Contrast.    6/6/23 CT Cervical Spine without Contrast  1. CT imaging shows no acute intracranial abnormality. 2. Left maxillary sinus disease in this patient with prior ESS. CT CERVICAL SPINE FINDINGS: There is no evidence of fracture. Anterior discectomy and fusion (ACDF) is seen at C4-C5 with stand-alone implanted device. ACDF also is present at C5-C6 with anterior plate with screw fixation. Small anterior marginal osteophyte formation is noted at C3-C4. There is no malalignment. Prevertebral and retropharyngeal soft tissues are normal. Lung apices are clear. IMPRESSION: 1. CT of the cervical spine shows no acute osseous abnormality. 2. Operative changes of C4-C6 ACDF.   6/6/23 CT Head without Contrast  1. CT imaging shows no acute intracranial abnormality. 2. Left maxillary sinus disease in this patient with prior ESS. CT CERVICAL SPINE FINDINGS: There is no evidence of fracture. Anterior discectomy and fusion (ACDF) is seen at C4-C5 with stand-alone implanted device. ACDF also is present at C5-C6 with anterior plate with screw fixation. Small anterior marginal osteophyte formation is noted at C3-C4. There is no malalignment. Prevertebral and retropharyngeal soft tissues are normal. Lung apices are clear. IMPRESSION: 1. CT of the cervical spine shows no acute osseous abnormality. 2. Operative changes of C4-C6 ACDF. 6/6/23 CT Lumbar Spine without Contrast  1. CT shows no acute osseous abnormality within the thoracic spine. 2. Mild degenerative spondylosis. 3. Esophageal features suggesting GERD. CT LUMBAR SPINE FINDINGS: Lumbar vertebral body  stature is maintained. Anterior cortical margins are smooth. Operative changes are noted with bilateral intrapedicular screws in L3, L4 and L5 with anchoring vertical rods. Spinous processes have been resected at L3 and L5 with laminectomy of L4. Central canal is poorly evaluated without intrathecal contrast. However, there is severe osseous spinal stenosis at L2-L3. Minimal atherosclerotic calcified plaquing in the abdominal aorta is noted. Imaging continued through the sacroiliac joints which show no acute pathology. Innumerable diverticula in the sigmoid are seen. IMPRESSION: 1. Severe osseous spinal stenosis at L2-L3. 2. Posterior orthopedic fusion spanning L3 through L5. 3. Sigmoidal diverticulosis. :     6/6/23 CT Thoracic Spine without Contrast  1. CT shows no acute osseous abnormality within the thoracic spine. 2. Mild degenerative spondylosis. 3. Esophageal features suggesting GERD. CT LUMBAR SPINE FINDINGS: Lumbar vertebral body stature is maintained. Anterior cortical margins are smooth. Operative changes are noted with bilateral intrapedicular screws in L3, L4 and L5 with anchoring vertical rods. Spinous processes have been resected at L3 and L5 with laminectomy of L4. Central canal is poorly evaluated without intrathecal contrast. However, there is severe osseous spinal stenosis at L2-L3. Minimal atherosclerotic calcified plaquing in the abdominal aorta is noted. Imaging continued through the sacroiliac joints which show no acute pathology. Innumerable diverticula in the sigmoid are seen. IMPRESSION: 1. Severe osseous spinal stenosis at L2-L3. 2. Posterior orthopedic fusion spanning L3 through L5. 3. Sigmoidal diverticulosis.     7/24/23 CT AP:  1. Mild mucosal prominence, edema, and diverticula at the junction of the descending sigmoid colon suggesting a mild acute diverticulitis borderline hepatomegaly  2. Thoracolumbar spondylosis with postsurgical changes at the lumbar spine and stabilization  hardware.  There is suspect moderate to severe encroachment into the central spinal canal and to a lesser extent the neural foramina at the upper and mid lumbar spine.  3. Mild mucosal prominence at the lower esophagus  4. Borderline prostatomegaly with mass effect on the posteroinferior bladder margin.  Underlying pathology must be considered  5. Cholelithiasis without evidence of acute cholecystitis     Assessment:     1. DVT of deep femoral vein, left        Myeloma work up is negative  CT AP shows borderine prostamegaly with mass effect and mucosal prominence edema and diverticula at the junction of descending sigmoid colon. Since no obvious cancer will perform thrombophilia work up but will refer for colonoscipy; PSA by Dr. Ford was normal at <1..  CEA is slightly elevated at 3.9.Had a colonosocpy that revealed 2 polyps  Repeat LE US shows resolution of clot  Had bronchoscopy with Dr. George for one year history of hemoptysis that preceeded his anticoagulation.   Now has had a stroke/TIA Mid October while blood thinners. Now is his baby ASA and on the blood thinner once a day.   Plan:     Problem List Items Addressed This Visit    None  Visit Diagnoses       DVT of deep femoral vein, left    -  Primary        PLAN:  Life long anticoagulation as patient had a stroke/TIA while on anticoagulation and PE/DVT was unprovoked. Since his stroke he is now on baby ASA and Savaysa 60mg once a day instead of twice a day. Will see yearly. Thrombophilia and oncologic workup was negative, including a LOI 2  Otherwise...  PSA by Dr. Ford was normal; he will be seeing Dr. Blu Osuna.   Referred to pulmonary for an at least a year history of hemoptysis and has been smoking since age 15 yo; I also suggested he follow up with his ENT. Bronch without cancer by Dr. George in 11/2023  He is supposed to have surgery on his neck by Dr. Jin Demarco in [place] in January; he has been cleared by cardiology  Repeat left venous NIVA  for ongoing swelling was done 10/2023 and reveals no thrombosis

## 2024-08-02 ENCOUNTER — TELEPHONE (OUTPATIENT)
Dept: HEMATOLOGY/ONCOLOGY | Facility: CLINIC | Age: 54
End: 2024-08-02
Payer: MEDICARE

## 2024-08-02 DIAGNOSIS — I82.412 DVT OF DEEP FEMORAL VEIN, LEFT: Primary | ICD-10-CM

## 2024-08-02 DIAGNOSIS — I26.99 PULMONARY EMBOLISM, UNSPECIFIED CHRONICITY, UNSPECIFIED PULMONARY EMBOLISM TYPE, UNSPECIFIED WHETHER ACUTE COR PULMONALE PRESENT: ICD-10-CM

## 2024-08-02 DIAGNOSIS — L02.92 RECURRENT BOILS: ICD-10-CM

## 2024-08-02 DIAGNOSIS — R64 CACHEXIA: ICD-10-CM

## 2024-08-02 DIAGNOSIS — R78.9 FINDING OF UNSPECIFIED SUBSTANCE, NOT NORMALLY FOUND IN BLOOD: ICD-10-CM

## 2024-08-02 DIAGNOSIS — R97.8 OTHER ABNORMAL TUMOR MARKERS: ICD-10-CM

## 2024-08-02 NOTE — TELEPHONE ENCOUNTER
"----- Message from Natasha Talavera LPN sent at 8/2/2024  2:11 PM CDT -----  Called and clarified patient's request. Patient has had decline recently in his health including losing a significant amount of weight and recent surgeries, he states he was told by one of his doctors that he needs to be evaluated by a cancer doctor and that [facility] could evaluate him. This nurse made patient aware that we provide both hematology and oncology care. Patient was asked if he would like to come in sooner to clinic than his 1 year follow up in November '24 to be seen by a provider and determine best plan of care. Patient stated that he would like this very much. Patient was made aware that he would be seeing NP first and based on clinic findings would follow up with MD. Patient agreed to this plan. Patient is scheduled for 8/5/24 @ 2 pm with labs @ 1:30 pm. Patient was also made aware that some labs collected on 8/5/24 will not result same day as appointment but will give baseline on how proceed with further testing. Patient acknowledged this and verbalized understanding  ----- Message -----  From: Meri Bryant  Sent: 8/2/2024   1:36 PM CDT  To: Natasha Talavera LPN    Patient contacted the clinic via telephone and stating that he is "trying to get all his stuff transferred to [facility]".  Patient is asking for our clinic to fax his medical records to [facility] [phone]  "

## 2024-08-05 ENCOUNTER — OFFICE VISIT (OUTPATIENT)
Dept: HEMATOLOGY/ONCOLOGY | Facility: CLINIC | Age: 54
End: 2024-08-05
Payer: MEDICARE

## 2024-08-05 ENCOUNTER — LAB VISIT (OUTPATIENT)
Dept: LAB | Facility: HOSPITAL | Age: 54
End: 2024-08-05
Payer: COMMERCIAL

## 2024-08-05 VITALS
BODY MASS INDEX: 30.57 KG/M2 | SYSTOLIC BLOOD PRESSURE: 114 MMHG | HEART RATE: 77 BPM | RESPIRATION RATE: 20 BRPM | TEMPERATURE: 98 F | DIASTOLIC BLOOD PRESSURE: 77 MMHG | HEIGHT: 74 IN | OXYGEN SATURATION: 96 % | WEIGHT: 238.19 LBS

## 2024-08-05 DIAGNOSIS — R64 CACHEXIA: ICD-10-CM

## 2024-08-05 DIAGNOSIS — R63.4 WEIGHT LOSS, UNINTENTIONAL: ICD-10-CM

## 2024-08-05 DIAGNOSIS — R78.9 FINDING OF UNSPECIFIED SUBSTANCE, NOT NORMALLY FOUND IN BLOOD: ICD-10-CM

## 2024-08-05 DIAGNOSIS — I26.99 PULMONARY EMBOLISM, UNSPECIFIED CHRONICITY, UNSPECIFIED PULMONARY EMBOLISM TYPE, UNSPECIFIED WHETHER ACUTE COR PULMONALE PRESENT: ICD-10-CM

## 2024-08-05 DIAGNOSIS — I82.412 DVT OF DEEP FEMORAL VEIN, LEFT: ICD-10-CM

## 2024-08-05 DIAGNOSIS — R97.8 OTHER ABNORMAL TUMOR MARKERS: ICD-10-CM

## 2024-08-05 DIAGNOSIS — D58.2 ELEVATED HEMOGLOBIN: ICD-10-CM

## 2024-08-05 DIAGNOSIS — R63.4 WEIGHT LOSS: Primary | ICD-10-CM

## 2024-08-05 DIAGNOSIS — L02.92 RECURRENT BOILS: ICD-10-CM

## 2024-08-05 LAB
ALBUMIN SERPL-MCNC: 4.5 G/DL (ref 3.5–5)
ALBUMIN/GLOB SERPL: 1.1 RATIO (ref 1.1–2)
ALP SERPL-CCNC: 140 UNIT/L (ref 40–150)
ALT SERPL-CCNC: 23 UNIT/L (ref 0–55)
ANION GAP SERPL CALC-SCNC: 10 MEQ/L
APTT PPP: 32.4 SECONDS (ref 24.6–37.2)
AST SERPL-CCNC: 17 UNIT/L (ref 5–34)
BASOPHILS # BLD AUTO: 0.13 X10(3)/MCL
BASOPHILS NFR BLD AUTO: 1.7 %
BILIRUB SERPL-MCNC: 0.8 MG/DL
BUN SERPL-MCNC: 9 MG/DL (ref 8.4–25.7)
CALCIUM SERPL-MCNC: 10.4 MG/DL (ref 8.4–10.2)
CANCER AG19-9 SERPL-ACNC: 30.81 UNIT/ML (ref 0–37)
CEA SERPL-MCNC: 3.98 NG/ML (ref 0–3)
CHLORIDE SERPL-SCNC: 105 MMOL/L (ref 98–107)
CO2 SERPL-SCNC: 24 MMOL/L (ref 22–29)
CREAT SERPL-MCNC: 0.95 MG/DL (ref 0.73–1.18)
CREAT/UREA NIT SERPL: 9
EOSINOPHIL # BLD AUTO: 0.09 X10(3)/MCL (ref 0–0.9)
EOSINOPHIL NFR BLD AUTO: 1.2 %
ERYTHROCYTE [DISTWIDTH] IN BLOOD BY AUTOMATED COUNT: 14.7 % (ref 11.5–17)
FERRITIN SERPL-MCNC: 58.28 NG/ML (ref 21.81–274.66)
FOLATE SERPL-MCNC: 9.6 NG/ML (ref 7–31.4)
GFR SERPLBLD CREATININE-BSD FMLA CKD-EPI: >60 ML/MIN/1.73/M2
GLOBULIN SER-MCNC: 4 GM/DL (ref 2.4–3.5)
GLUCOSE SERPL-MCNC: 124 MG/DL (ref 74–100)
HAPTOGLOB SERPL-MCNC: 180 MG/DL (ref 14–258)
HCT VFR BLD AUTO: 54.3 % (ref 42–52)
HGB BLD-MCNC: 18.5 G/DL (ref 14–18)
IGA SERPL-MCNC: 217 MG/DL (ref 63–484)
IGG SERPL-MCNC: 996 MG/DL (ref 540–1822)
IGM SERPL-MCNC: 130 MG/DL (ref 22–240)
IMM GRANULOCYTES # BLD AUTO: 0.02 X10(3)/MCL (ref 0–0.04)
IMM GRANULOCYTES NFR BLD AUTO: 0.3 %
INR PPP: 1.1
IRON SATN MFR SERPL: 22 % (ref 20–50)
IRON SERPL-MCNC: 85 UG/DL (ref 65–175)
LYMPHOCYTES # BLD AUTO: 1.65 X10(3)/MCL (ref 0.6–4.6)
LYMPHOCYTES NFR BLD AUTO: 21.5 %
MCH RBC QN AUTO: 30.1 PG (ref 27–31)
MCHC RBC AUTO-ENTMCNC: 34.1 G/DL (ref 33–36)
MCV RBC AUTO: 88.4 FL (ref 80–94)
MONOCYTES # BLD AUTO: 0.42 X10(3)/MCL (ref 0.1–1.3)
MONOCYTES NFR BLD AUTO: 5.5 %
NEUTROPHILS # BLD AUTO: 5.37 X10(3)/MCL (ref 2.1–9.2)
NEUTROPHILS NFR BLD AUTO: 69.8 %
PLATELET # BLD AUTO: 283 X10(3)/MCL (ref 130–400)
PMV BLD AUTO: 9.7 FL (ref 7.4–10.4)
POTASSIUM SERPL-SCNC: 4.1 MMOL/L (ref 3.5–5.1)
PROT SERPL-MCNC: 8.5 GM/DL (ref 6.4–8.3)
PROTHROMBIN TIME: 14.4 SECONDS (ref 12.5–14.5)
RBC # BLD AUTO: 6.14 X10(6)/MCL (ref 4.7–6.1)
RET# (OHS): 0.08 X10E6/UL (ref 0.03–0.1)
RETICULOCYTE COUNT AUTOMATED (OLG): 1.38 % (ref 1.1–2.1)
SODIUM SERPL-SCNC: 139 MMOL/L (ref 136–145)
TIBC SERPL-MCNC: 297 UG/DL (ref 69–240)
TIBC SERPL-MCNC: 382 UG/DL (ref 250–450)
TRANSFERRIN SERPL-MCNC: 323 MG/DL (ref 174–364)
VIT B12 SERPL-MCNC: 306 PG/ML (ref 213–816)
WBC # BLD AUTO: 7.68 X10(3)/MCL (ref 4.5–11.5)

## 2024-08-05 PROCEDURE — 82607 VITAMIN B-12: CPT

## 2024-08-05 PROCEDURE — 85045 AUTOMATED RETICULOCYTE COUNT: CPT

## 2024-08-05 PROCEDURE — 82784 ASSAY IGA/IGD/IGG/IGM EACH: CPT

## 2024-08-05 PROCEDURE — 83550 IRON BINDING TEST: CPT

## 2024-08-05 PROCEDURE — 80053 COMPREHEN METABOLIC PANEL: CPT

## 2024-08-05 PROCEDURE — 3008F BODY MASS INDEX DOCD: CPT | Mod: CPTII,S$GLB,,

## 2024-08-05 PROCEDURE — 83010 ASSAY OF HAPTOGLOBIN QUANT: CPT

## 2024-08-05 PROCEDURE — 83540 ASSAY OF IRON: CPT

## 2024-08-05 PROCEDURE — 3074F SYST BP LT 130 MM HG: CPT | Mod: CPTII,S$GLB,,

## 2024-08-05 PROCEDURE — 99215 OFFICE O/P EST HI 40 MIN: CPT | Mod: S$GLB,,,

## 2024-08-05 PROCEDURE — 82378 CARCINOEMBRYONIC ANTIGEN: CPT

## 2024-08-05 PROCEDURE — 3078F DIAST BP <80 MM HG: CPT | Mod: CPTII,S$GLB,,

## 2024-08-05 PROCEDURE — 85025 COMPLETE CBC W/AUTO DIFF WBC: CPT

## 2024-08-05 PROCEDURE — 86334 IMMUNOFIX E-PHORESIS SERUM: CPT

## 2024-08-05 PROCEDURE — 85730 THROMBOPLASTIN TIME PARTIAL: CPT

## 2024-08-05 PROCEDURE — 36415 COLL VENOUS BLD VENIPUNCTURE: CPT

## 2024-08-05 PROCEDURE — 84466 ASSAY OF TRANSFERRIN: CPT

## 2024-08-05 PROCEDURE — 85610 PROTHROMBIN TIME: CPT

## 2024-08-05 PROCEDURE — 83521 IG LIGHT CHAINS FREE EACH: CPT

## 2024-08-05 PROCEDURE — 1159F MED LIST DOCD IN RCRD: CPT | Mod: CPTII,S$GLB,,

## 2024-08-05 PROCEDURE — 82746 ASSAY OF FOLIC ACID SERUM: CPT

## 2024-08-05 PROCEDURE — 99999 PR PBB SHADOW E&M-EST. PATIENT-LVL V: CPT | Mod: PBBFAC,,,

## 2024-08-05 PROCEDURE — 86301 IMMUNOASSAY TUMOR CA 19-9: CPT

## 2024-08-05 PROCEDURE — 1160F RVW MEDS BY RX/DR IN RCRD: CPT | Mod: CPTII,S$GLB,,

## 2024-08-05 PROCEDURE — 82728 ASSAY OF FERRITIN: CPT

## 2024-08-05 RX ORDER — APIXABAN 5 MG/1
TABLET, FILM COATED ORAL
COMMUNITY
Start: 2024-07-31

## 2024-08-05 RX ORDER — SUCRALFATE 1 G/1
1 TABLET ORAL 2 TIMES DAILY
COMMUNITY
Start: 2024-07-31

## 2024-08-05 RX ORDER — DULAGLUTIDE 1.5 MG/.5ML
INJECTION, SOLUTION SUBCUTANEOUS
COMMUNITY
Start: 2024-05-08

## 2024-08-05 RX ORDER — VENLAFAXINE HYDROCHLORIDE 150 MG/1
CAPSULE, EXTENDED RELEASE ORAL
COMMUNITY
Start: 2024-07-31

## 2024-08-06 LAB
ALBUMIN % SPEP (OHS): 47.33 (ref 48.1–59.5)
ALBUMIN SERPL-MCNC: 3.4 G/DL (ref 3.5–5)
ALBUMIN/GLOB SERPL: 0.9 RATIO (ref 1.1–2)
ALPHA 1 GLOB (OHS): 0.28 GM/DL (ref 0–0.4)
ALPHA 1 GLOB% (OHS): 3.84 (ref 2.3–4.9)
ALPHA 2 GLOB % (OHS): 14.43 (ref 6.9–13)
ALPHA 2 GLOB (OHS): 1.04 GM/DL (ref 0.4–1)
BETA GLOB (OHS): 1.23 GM/DL (ref 0.7–1.3)
BETA GLOB% (OHS): 17.09 (ref 13.8–19.7)
GAMMA GLOBULIN % (OHS): 17.31 (ref 10.1–21.9)
GAMMA GLOBULIN (OHS): 1.25 GM/DL (ref 0.4–1.8)
GLOBULIN SER-MCNC: 3.8 GM/DL (ref 2.4–3.5)
KAPPA LC FREE SER NEPH-MCNC: 1.37 MG/DL (ref 0.33–1.94)
KAPPA LC FREE/LAMBDA FREE SER NEPH: 0.99 {RATIO} (ref 0.26–1.65)
LAMBDA LC FREE SER NEPH-MCNC: 1.38 MG/DL (ref 0.57–2.63)
M SPIKE % (OHS): ABNORMAL
M SPIKE (OHS): ABNORMAL
PATH REV: NORMAL
PROT SERPL-MCNC: 7.2 GM/DL (ref 6.4–8.3)

## 2024-08-14 ENCOUNTER — DOCUMENTATION ONLY (OUTPATIENT)
Dept: HEMATOLOGY/ONCOLOGY | Facility: CLINIC | Age: 54
End: 2024-08-14
Payer: COMMERCIAL

## 2024-08-14 NOTE — PROGRESS NOTES
Patient recently had an insurance change. He is now with [employer], however, [facility] is not in network. I called to inform the patient that our facility is not in network, but [facility] is in network. I further explained that we could coordinate a transfer of care to avoid any out of network costs. The other option would be to sign a notice of non-coverage and he would be responsible for all costs if he decided to remain with our clinic. The patient decided to proceed with a transfer of care, but requested to be transferred to [facility] in [place]. I explained to the patient that he would have to contact his insurance plan to see if that facility is in network to avoid the same out of network situation. The patient understands the risks and insisted on proceeding with the transfer of care to [facility] in [place]. Nurse navigator notified and will begin the transfer.

## 2024-08-23 ENCOUNTER — DOCUMENTATION ONLY (OUTPATIENT)
Dept: HEMATOLOGY/ONCOLOGY | Facility: CLINIC | Age: 54
End: 2024-08-23
Payer: COMMERCIAL

## 2025-06-14 ENCOUNTER — HOSPITAL ENCOUNTER (INPATIENT)
Facility: HOSPITAL | Age: 55
LOS: 4 days | Discharge: HOME | DRG: 057 | End: 2025-06-18
Attending: EMERGENCY MEDICINE | Admitting: INTERNAL MEDICINE
Payer: MEDICARE

## 2025-06-14 DIAGNOSIS — R25.2 SPASTICITY: ICD-10-CM

## 2025-06-14 DIAGNOSIS — R07.9 CHEST PAIN: ICD-10-CM

## 2025-06-14 DIAGNOSIS — R45.851 SUICIDAL IDEATION: Primary | ICD-10-CM

## 2025-06-14 LAB
ALBUMIN SERPL-MCNC: 4.2 G/DL (ref 3.5–5)
ALBUMIN/GLOB SERPL: 1 RATIO (ref 1.1–2)
ALP SERPL-CCNC: 159 UNIT/L (ref 40–150)
ALT SERPL-CCNC: 29 UNIT/L (ref 0–55)
ANION GAP SERPL CALC-SCNC: 11 MEQ/L
APTT PPP: 32.8 SECONDS (ref 23.2–33.7)
AST SERPL-CCNC: 16 UNIT/L (ref 11–45)
BASOPHILS # BLD AUTO: 0.18 X10(3)/MCL
BASOPHILS NFR BLD AUTO: 1.6 %
BILIRUB SERPL-MCNC: 1.4 MG/DL
BUN SERPL-MCNC: 12 MG/DL (ref 8.4–25.7)
CALCIUM SERPL-MCNC: 9.7 MG/DL (ref 8.4–10.2)
CHLORIDE SERPL-SCNC: 104 MMOL/L (ref 98–107)
CK SERPL-CCNC: 50 U/L (ref 30–200)
CO2 SERPL-SCNC: 23 MMOL/L (ref 22–29)
CREAT SERPL-MCNC: 0.93 MG/DL (ref 0.72–1.25)
CREAT/UREA NIT SERPL: 13
CRP SERPL-MCNC: 3.7 MG/L
EOSINOPHIL # BLD AUTO: 0.14 X10(3)/MCL (ref 0–0.9)
EOSINOPHIL NFR BLD AUTO: 1.3 %
ERYTHROCYTE [DISTWIDTH] IN BLOOD BY AUTOMATED COUNT: 17.2 % (ref 11.5–17)
ETHANOL SERPL-MCNC: <10 MG/DL
GFR SERPLBLD CREATININE-BSD FMLA CKD-EPI: >60 ML/MIN/1.73/M2
GGT SERPL-CCNC: 40 U/L (ref 12–64)
GLOBULIN SER-MCNC: 4.2 GM/DL (ref 2.4–3.5)
GLUCOSE SERPL-MCNC: 121 MG/DL (ref 74–100)
HCT VFR BLD AUTO: 54.3 % (ref 42–52)
HGB BLD-MCNC: 17.9 G/DL (ref 14–18)
IMM GRANULOCYTES # BLD AUTO: 0.06 X10(3)/MCL (ref 0–0.04)
IMM GRANULOCYTES NFR BLD AUTO: 0.5 %
INR PPP: 1.1
LYMPHOCYTES # BLD AUTO: 2.78 X10(3)/MCL (ref 0.6–4.6)
LYMPHOCYTES NFR BLD AUTO: 25.2 %
MAGNESIUM SERPL-MCNC: 1.7 MG/DL (ref 1.6–2.6)
MCH RBC QN AUTO: 28.8 PG (ref 27–31)
MCHC RBC AUTO-ENTMCNC: 33 G/DL (ref 33–36)
MCV RBC AUTO: 87.4 FL (ref 80–94)
MONOCYTES # BLD AUTO: 0.79 X10(3)/MCL (ref 0.1–1.3)
MONOCYTES NFR BLD AUTO: 7.2 %
NEUTROPHILS # BLD AUTO: 7.07 X10(3)/MCL (ref 2.1–9.2)
NEUTROPHILS NFR BLD AUTO: 64.2 %
NRBC BLD AUTO-RTO: 0 %
PLATELET # BLD AUTO: 326 X10(3)/MCL (ref 130–400)
PMV BLD AUTO: 10.3 FL (ref 7.4–10.4)
POTASSIUM SERPL-SCNC: 3.5 MMOL/L (ref 3.5–5.1)
PROT SERPL-MCNC: 8.4 GM/DL (ref 6.4–8.3)
PROTHROMBIN TIME: 14.7 SECONDS (ref 12.5–14.5)
RBC # BLD AUTO: 6.21 X10(6)/MCL (ref 4.7–6.1)
SODIUM SERPL-SCNC: 138 MMOL/L (ref 136–145)
TROPONIN I SERPL-MCNC: <0.01 NG/ML (ref 0–0.04)
TSH SERPL-ACNC: 3.7 UIU/ML (ref 0.35–4.94)
WBC # BLD AUTO: 11.02 X10(3)/MCL (ref 4.5–11.5)

## 2025-06-14 PROCEDURE — 93005 ELECTROCARDIOGRAM TRACING: CPT

## 2025-06-14 PROCEDURE — 84443 ASSAY THYROID STIM HORMONE: CPT | Performed by: EMERGENCY MEDICINE

## 2025-06-14 PROCEDURE — 85025 COMPLETE CBC W/AUTO DIFF WBC: CPT | Performed by: PHYSICIAN ASSISTANT

## 2025-06-14 PROCEDURE — 84484 ASSAY OF TROPONIN QUANT: CPT | Performed by: PHYSICIAN ASSISTANT

## 2025-06-14 PROCEDURE — 99285 EMERGENCY DEPT VISIT HI MDM: CPT | Mod: 25

## 2025-06-14 PROCEDURE — 86140 C-REACTIVE PROTEIN: CPT | Performed by: EMERGENCY MEDICINE

## 2025-06-14 PROCEDURE — 96360 HYDRATION IV INFUSION INIT: CPT

## 2025-06-14 PROCEDURE — 80053 COMPREHEN METABOLIC PANEL: CPT | Performed by: PHYSICIAN ASSISTANT

## 2025-06-14 PROCEDURE — 82550 ASSAY OF CK (CPK): CPT | Performed by: EMERGENCY MEDICINE

## 2025-06-14 PROCEDURE — 82977 ASSAY OF GGT: CPT | Performed by: STUDENT IN AN ORGANIZED HEALTH CARE EDUCATION/TRAINING PROGRAM

## 2025-06-14 PROCEDURE — 25000003 PHARM REV CODE 250: Performed by: INTERNAL MEDICINE

## 2025-06-14 PROCEDURE — 85610 PROTHROMBIN TIME: CPT | Performed by: PHYSICIAN ASSISTANT

## 2025-06-14 PROCEDURE — 82077 ASSAY SPEC XCP UR&BREATH IA: CPT | Performed by: EMERGENCY MEDICINE

## 2025-06-14 PROCEDURE — 83735 ASSAY OF MAGNESIUM: CPT | Performed by: PHYSICIAN ASSISTANT

## 2025-06-14 PROCEDURE — 11000001 HC ACUTE MED/SURG PRIVATE ROOM

## 2025-06-14 PROCEDURE — 63600175 PHARM REV CODE 636 W HCPCS: Performed by: STUDENT IN AN ORGANIZED HEALTH CARE EDUCATION/TRAINING PROGRAM

## 2025-06-14 PROCEDURE — 85730 THROMBOPLASTIN TIME PARTIAL: CPT | Performed by: PHYSICIAN ASSISTANT

## 2025-06-14 PROCEDURE — 25000003 PHARM REV CODE 250: Performed by: STUDENT IN AN ORGANIZED HEALTH CARE EDUCATION/TRAINING PROGRAM

## 2025-06-14 PROCEDURE — 63600175 PHARM REV CODE 636 W HCPCS: Performed by: EMERGENCY MEDICINE

## 2025-06-14 PROCEDURE — 93010 ELECTROCARDIOGRAM REPORT: CPT | Mod: ,,, | Performed by: INTERNAL MEDICINE

## 2025-06-14 RX ORDER — SODIUM CHLORIDE 0.9 % (FLUSH) 0.9 %
10 SYRINGE (ML) INJECTION
Status: DISCONTINUED | OUTPATIENT
Start: 2025-06-14 | End: 2025-06-18 | Stop reason: HOSPADM

## 2025-06-14 RX ORDER — HYDROXYZINE HYDROCHLORIDE 50 MG/1
50 TABLET, FILM COATED ORAL 2 TIMES DAILY
COMMUNITY
Start: 2024-12-16

## 2025-06-14 RX ORDER — SIMETHICONE 80 MG
1 TABLET,CHEWABLE ORAL 4 TIMES DAILY PRN
Status: DISCONTINUED | OUTPATIENT
Start: 2025-06-14 | End: 2025-06-18 | Stop reason: HOSPADM

## 2025-06-14 RX ORDER — TAMSULOSIN HYDROCHLORIDE 0.4 MG/1
1 CAPSULE ORAL DAILY
Status: DISCONTINUED | OUTPATIENT
Start: 2025-06-15 | End: 2025-06-18 | Stop reason: HOSPADM

## 2025-06-14 RX ORDER — IBUPROFEN 200 MG
24 TABLET ORAL
Status: DISCONTINUED | OUTPATIENT
Start: 2025-06-14 | End: 2025-06-18 | Stop reason: HOSPADM

## 2025-06-14 RX ORDER — SUMATRIPTAN SUCCINATE 25 MG/1
50 TABLET ORAL DAILY PRN
Status: DISCONTINUED | OUTPATIENT
Start: 2025-06-14 | End: 2025-06-18 | Stop reason: HOSPADM

## 2025-06-14 RX ORDER — TALC
9 POWDER (GRAM) TOPICAL NIGHTLY PRN
Status: DISCONTINUED | OUTPATIENT
Start: 2025-06-14 | End: 2025-06-18 | Stop reason: HOSPADM

## 2025-06-14 RX ORDER — TEMAZEPAM 30 MG/1
30 CAPSULE ORAL NIGHTLY PRN
COMMUNITY

## 2025-06-14 RX ORDER — AMANTADINE HYDROCHLORIDE 100 MG/1
200 CAPSULE, GELATIN COATED ORAL DAILY
COMMUNITY

## 2025-06-14 RX ORDER — TAMSULOSIN HYDROCHLORIDE 0.4 MG/1
1 CAPSULE ORAL DAILY
COMMUNITY

## 2025-06-14 RX ORDER — IPRATROPIUM BROMIDE AND ALBUTEROL SULFATE 2.5; .5 MG/3ML; MG/3ML
3 SOLUTION RESPIRATORY (INHALATION) EVERY 6 HOURS PRN
Status: DISCONTINUED | OUTPATIENT
Start: 2025-06-14 | End: 2025-06-18 | Stop reason: HOSPADM

## 2025-06-14 RX ORDER — NALOXONE HCL 0.4 MG/ML
0.02 VIAL (ML) INJECTION
Status: DISCONTINUED | OUTPATIENT
Start: 2025-06-14 | End: 2025-06-18 | Stop reason: HOSPADM

## 2025-06-14 RX ORDER — ESCITALOPRAM OXALATE 10 MG/1
20 TABLET ORAL DAILY
Status: DISCONTINUED | OUTPATIENT
Start: 2025-06-15 | End: 2025-06-18 | Stop reason: HOSPADM

## 2025-06-14 RX ORDER — IBUPROFEN 200 MG
16 TABLET ORAL
Status: DISCONTINUED | OUTPATIENT
Start: 2025-06-14 | End: 2025-06-18 | Stop reason: HOSPADM

## 2025-06-14 RX ORDER — MAGNESIUM SULFATE HEPTAHYDRATE 40 MG/ML
2 INJECTION, SOLUTION INTRAVENOUS ONCE
Status: DISCONTINUED | OUTPATIENT
Start: 2025-06-14 | End: 2025-06-18 | Stop reason: HOSPADM

## 2025-06-14 RX ORDER — SUCRALFATE 1 G/1
1 TABLET ORAL 2 TIMES DAILY
Status: DISCONTINUED | OUTPATIENT
Start: 2025-06-14 | End: 2025-06-18 | Stop reason: HOSPADM

## 2025-06-14 RX ORDER — CYCLOBENZAPRINE HCL 10 MG
10 TABLET ORAL 3 TIMES DAILY PRN
Status: DISCONTINUED | OUTPATIENT
Start: 2025-06-14 | End: 2025-06-18 | Stop reason: HOSPADM

## 2025-06-14 RX ORDER — ONDANSETRON 4 MG/1
8 TABLET, ORALLY DISINTEGRATING ORAL EVERY 8 HOURS PRN
Status: DISCONTINUED | OUTPATIENT
Start: 2025-06-14 | End: 2025-06-18 | Stop reason: HOSPADM

## 2025-06-14 RX ORDER — POLYETHYLENE GLYCOL 3350 17 G/17G
17 POWDER, FOR SOLUTION ORAL 3 TIMES DAILY PRN
Status: DISCONTINUED | OUTPATIENT
Start: 2025-06-14 | End: 2025-06-16

## 2025-06-14 RX ORDER — QUETIAPINE 200 MG/1
200 TABLET, FILM COATED, EXTENDED RELEASE ORAL DAILY
Status: DISCONTINUED | OUTPATIENT
Start: 2025-06-15 | End: 2025-06-15

## 2025-06-14 RX ORDER — HYDROXYZINE HYDROCHLORIDE 25 MG/1
50 TABLET, FILM COATED ORAL 2 TIMES DAILY
Status: DISCONTINUED | OUTPATIENT
Start: 2025-06-14 | End: 2025-06-18 | Stop reason: HOSPADM

## 2025-06-14 RX ORDER — BISACODYL 10 MG/1
10 SUPPOSITORY RECTAL DAILY PRN
Status: DISCONTINUED | OUTPATIENT
Start: 2025-06-14 | End: 2025-06-18 | Stop reason: HOSPADM

## 2025-06-14 RX ORDER — ONDANSETRON HYDROCHLORIDE 2 MG/ML
4 INJECTION, SOLUTION INTRAVENOUS EVERY 8 HOURS PRN
Status: DISCONTINUED | OUTPATIENT
Start: 2025-06-14 | End: 2025-06-18 | Stop reason: HOSPADM

## 2025-06-14 RX ORDER — ACETAMINOPHEN 325 MG/1
650 TABLET ORAL EVERY 4 HOURS PRN
Status: DISCONTINUED | OUTPATIENT
Start: 2025-06-14 | End: 2025-06-18 | Stop reason: HOSPADM

## 2025-06-14 RX ORDER — ALUMINUM HYDROXIDE, MAGNESIUM HYDROXIDE, AND SIMETHICONE 1200; 120; 1200 MG/30ML; MG/30ML; MG/30ML
30 SUSPENSION ORAL 4 TIMES DAILY PRN
Status: DISCONTINUED | OUTPATIENT
Start: 2025-06-14 | End: 2025-06-18 | Stop reason: HOSPADM

## 2025-06-14 RX ORDER — VENLAFAXINE HYDROCHLORIDE 150 MG/1
150 CAPSULE, EXTENDED RELEASE ORAL DAILY
Status: DISCONTINUED | OUTPATIENT
Start: 2025-06-15 | End: 2025-06-17

## 2025-06-14 RX ORDER — ESCITALOPRAM OXALATE 20 MG/1
20 TABLET ORAL DAILY
COMMUNITY
Start: 2025-02-04

## 2025-06-14 RX ORDER — QUETIAPINE 200 MG/1
200 TABLET, FILM COATED, EXTENDED RELEASE ORAL DAILY
Status: ON HOLD | COMMUNITY
Start: 2024-12-16 | End: 2025-06-18

## 2025-06-14 RX ORDER — HYDROCODONE BITARTRATE AND ACETAMINOPHEN 10; 325 MG/1; MG/1
1 TABLET ORAL EVERY 6 HOURS PRN
Refills: 0 | Status: DISCONTINUED | OUTPATIENT
Start: 2025-06-14 | End: 2025-06-18 | Stop reason: HOSPADM

## 2025-06-14 RX ORDER — CYCLOBENZAPRINE HCL 10 MG
10 TABLET ORAL 3 TIMES DAILY PRN
COMMUNITY
Start: 2025-05-28

## 2025-06-14 RX ORDER — GLUCAGON 1 MG
1 KIT INJECTION
Status: DISCONTINUED | OUTPATIENT
Start: 2025-06-14 | End: 2025-06-18 | Stop reason: HOSPADM

## 2025-06-14 RX ADMIN — SODIUM CHLORIDE, POTASSIUM CHLORIDE, SODIUM LACTATE AND CALCIUM CHLORIDE 1000 ML: 600; 310; 30; 20 INJECTION, SOLUTION INTRAVENOUS at 04:06

## 2025-06-14 RX ADMIN — AMANTADINE HYDROCHLORIDE 200 MG: 100 CAPSULE, LIQUID FILLED ORAL at 10:06

## 2025-06-14 RX ADMIN — HYDROXYZINE HYDROCHLORIDE 50 MG: 25 TABLET, FILM COATED ORAL at 10:06

## 2025-06-14 RX ADMIN — APIXABAN 5 MG: 5 TABLET, FILM COATED ORAL at 10:06

## 2025-06-14 RX ADMIN — HYDROCODONE BITARTRATE AND ACETAMINOPHEN 1 TABLET: 10; 325 TABLET ORAL at 10:06

## 2025-06-14 RX ADMIN — SUCRALFATE 1 G: 1 TABLET ORAL at 10:06

## 2025-06-14 RX ADMIN — ONDANSETRON 4 MG: 2 INJECTION INTRAMUSCULAR; INTRAVENOUS at 08:06

## 2025-06-14 NOTE — ED PROVIDER NOTES
"Encounter Date: 6/14/2025    SCRIBE #1 NOTE: I, Bobbi Clark, shruti scribing for, and in the presence of,  Bud Galo III, MD. I have scribed the following portions of the note - the EKG reading. Other sections scribed: HPI, ROS, PE.       History     Chief Complaint   Patient presents with    Multiple Complaints     Pt arrives with multiple complaints spanning 1 year. Reports N/V x 1 week, weight loss x 1 year, headache, "bugs crawling underneath skin". + CP. Seen in [place] for similar issues. Hx CVA.      Patient is a 54 year old male with a history of DM, DVT, HLD, TIA, parkinson's disease, anxiety, and depression presenting to the ED for nausea/vomiting after eating and drinking, bloody emesis, and bloody stool for the past week. Patient is on Eliquis for clots in his bilateral legs and heart. Patient's wife at bedside claims he received neck surgery January 2024, and began experiencing worsening symptoms shortly before the surgery. Patient's wife had to stop the patient from an attempted suicide prior to arrival to the ED due to the severity of the symptoms within the past 3 days. Patient endorses head pain that feels as though there is a "c-clamp" squeezing his head accompanied by facial pain. Endorses weight loss over the past year. He endorses diarrhea. Wife claims he will go 4 days without a bowel movement, then have diarrhea. Patient has recently been in and out of [facility], and denies ever getting better. He received a B12 shot yesterday. He denies traveling out of the country recently, smoking, alcoholic consumption, or marijuana usage.     Patient is undergoing clearance for upcoming back surgery of T10 and T11 with Dr. Demarco.   Surgeon, Ciara LA: Jin Demarco MD  Patient has a psychiatrist   Patient recently underwent parasite testing     Medications: Amantadine, Hydroxyzine, Citalopram, and Eliquis.     PEC 1620    The history is provided by the patient and the spouse. No language "  was used.     Review of patient's allergies indicates:   Allergen Reactions    Buprenorphine-naloxone     Tylox [oxycodone-acetaminophen]     Iodinated contrast media Nausea Only    Nitrous oxide Nausea And Vomiting     Past Medical History:   Diagnosis Date    Anxiety     Arthritis     Asthma     Back pain     Balance problems     Cervical herniated disc     Depression     Diabetes mellitus     Dry cough     DVT (deep venous thrombosis)     Enlarged prostate     Gout     Hemoptysis     HLD (hyperlipidemia)     Insomnia     Leg weakness     Lung mass     Migraines     Neck pain     Neuropathy     Obesity     PONV (postoperative nausea and vomiting)     Pulmonary embolism     Spinal stenosis     TIA (transient ischemic attack)     Use of cane as ambulatory aid     Walker as ambulation aid      Past Surgical History:   Procedure Laterality Date    ABCESS DRAINAGE      scrotal cyst    BRONCHIAL IRRIGATION  11/1/2023    Procedure: IRRIGATION, BRONCHUS;  Surgeon: ERIS George MD;  Location: [facility] BRONCHOSCOPY LAB;  Service: Pulmonary;;  RLL    CERVICAL FUSION      COLONOSCOPY W/ POLYPECTOMY  10/2023    FLEXIBLE BRONCHOSCOPY N/A 11/1/2023    Procedure: BRONCHOSCOPY, FIBEROPTIC;  Surgeon: ERIS George MD;  Location: [facility] BRONCHOSCOPY LAB;  Service: Pulmonary;  Laterality: N/A;    LUMBAR FUSION      NASAL POLYP EXCISION      PARTIAL KNEE ARTHROPLASTY Left     ROTATOR CUFF REPAIR Right     TYMPANOPLASTY Right      Family History   Adopted: Yes   Problem Relation Name Age of Onset    Seizures Mother      Heart failure Father       Social History[1]  Review of Systems   Constitutional:  Positive for appetite change (Decrease due to nausea/vomiting with eating/drinking.) and unexpected weight change (Weight loss over the past year.). Negative for fatigue and fever.   HENT:  Negative for congestion and rhinorrhea.         Endorses facial pain.    Eyes:  Negative for pain.   Respiratory:  Negative for chest  tightness, shortness of breath and wheezing.    Cardiovascular:  Negative for chest pain.   Gastrointestinal:  Positive for blood in stool, diarrhea, nausea and vomiting (Bloody.). Negative for abdominal pain and constipation.   Genitourinary:  Negative for dysuria.   Musculoskeletal:  Negative for back pain and neck pain.   Skin:  Negative for rash.   Allergic/Immunologic: Negative for environmental allergies, food allergies and immunocompromised state.   Neurological:  Positive for headaches. Negative for dizziness and speech difficulty.   Hematological:  Does not bruise/bleed easily.   Psychiatric/Behavioral:  Positive for suicidal ideas. Negative for sleep disturbance.        Physical Exam     Initial Vitals [06/14/25 1558]   BP Pulse Resp Temp SpO2   (!) 154/98 108 20 98.7 °F (37.1 °C) 98 %      MAP       --         Physical Exam    Nursing note and vitals reviewed.  Constitutional: No distress.   HENT:   Head: Normocephalic and atraumatic.   Neck: Trachea normal.   Cardiovascular:  Regular rhythm.   Tachycardia present.         No murmur heard.  Pulmonary/Chest: Breath sounds normal. No respiratory distress.   Abdominal: Abdomen is soft. Bowel sounds are normal. He exhibits no distension. There is no abdominal tenderness.   Musculoskeletal:         General: Normal range of motion.      Lumbar back: Normal.     Neurological: He is alert and oriented to person, place, and time. He has normal strength. He displays tremor. No cranial nerve deficit.   Skin: Skin is warm and dry. No rash noted.   Psychiatric: Judgment normal. His mood appears anxious.         ED Course   Procedures  Labs Reviewed   COMPREHENSIVE METABOLIC PANEL - Abnormal       Result Value    Sodium 138      Potassium 3.5      Chloride 104      CO2 23      Glucose 121 (*)     Blood Urea Nitrogen 12.0      Creatinine 0.93      Calcium 9.7      Protein Total 8.4 (*)     Albumin 4.2      Globulin 4.2 (*)     Albumin/Globulin Ratio 1.0 (*)      Bilirubin Total 1.4       (*)     ALT 29      AST 16      eGFR >60      Anion Gap 11.0      BUN/Creatinine Ratio 13     PROTIME-INR - Abnormal    PT 14.7 (*)     INR 1.1      Narrative:     Protimes are used to monitor anticoagulant agents such as warfarin. PT INR values are based on the current patient normal mean and the DYLAN value for the specific instrument reagent used.  **Routine theraputic target values for the INR are 2.0-3.0**   CBC WITH DIFFERENTIAL - Abnormal    WBC 11.02      RBC 6.21 (*)     Hgb 17.9      Hct 54.3 (*)     MCV 87.4      MCH 28.8      MCHC 33.0      RDW 17.2 (*)     Platelet 326      MPV 10.3      Neut % 64.2      Lymph % 25.2      Mono % 7.2      Eos % 1.3      Basophil % 1.6      Imm Grans % 0.5      Neut # 7.07      Lymph # 2.78      Mono # 0.79      Eos # 0.14      Baso # 0.18      Imm Gran # 0.06 (*)     NRBC% 0.0     APTT - Normal    PTT 32.8     TROPONIN I - Normal    Troponin-I <0.010     MAGNESIUM - Normal    Magnesium Level 1.70     TSH - Normal    TSH 3.702     CK - Normal    Creatine Kinase 50     ALCOHOL,MEDICAL (ETHANOL) - Normal    Ethanol Level <10.0     C-REACTIVE PROTEIN - Normal    CRP 3.70     CBC W/ AUTO DIFFERENTIAL    Narrative:     The following orders were created for panel order CBC auto differential.  Procedure                               Abnormality         Status                     ---------                               -----------         ------                     CBC with Differential[[id number]]       Abnormal            Final result                 Please view results for these tests on the individual orders.   DRUG SCREEN, URINE (BEAKER)     EKG Readings: (Independently Interpreted)   Initial Reading: No STEMI. Rhythm: Sinus Tachycardia. Heart Rate: 112. Ectopy: No Ectopy. Conduction: Normal. ST Segments: Normal ST Segments. T Waves: Normal. Axis: Normal. Clinical Impression: Sinus Tachycardia   1601       Imaging Results              X-Ray  Chest 1 View (Final result)  Result time 06/14/25 17:04:56      Final result by Omari Mcclain MD (06/14/25 17:04:56)                   Impression:      No acute cardiopulmonary process identified.      Electronically signed by: Omari Mcclain  Date:    06/14/2025  Time:    17:04               Narrative:    EXAMINATION:  XR CHEST 1 VIEW    CLINICAL HISTORY:  Chest pain, unspecified    TECHNIQUE:  One view    COMPARISON:  None available.    FINDINGS:  Cardiopericardial silhouette is within normal limits.  No acute dense focal or segmental consolidation, congestive process, pleural effusions or pneumothorax.  Partially visualized cervicothoracic fusions.                                       Medications   lactated ringers bolus 1,000 mL (0 mLs Intravenous Stopped 6/14/25 5637)     Medical Decision Making  Differential diagnosis includes, but is not limited to, thyroid disease, electrolyte disturbance, fibromyalgia, myelopathy, and new onset Parkinson's disease.    Patient had a gun to his head because he states he can no longer care for himself so physician's Emergency certificate filled out.  Patient globally spastic states this has been going on for over 6 months CBC chemistry electrolytes all normal patient has had neuro imaging in the past will admit to hospital medicine    Problems Addressed:  Spasticity: complicated acute illness or injury that poses a threat to life or bodily functions    Amount and/or Complexity of Data Reviewed  Independent Historian: spouse     Details: Patient is a 54 year old male with a history of DM, DVT, HLD, TIA, parkinson's disease, anxiety, and depression presenting to the ED for nausea/vomiting after eating and drinking, bloody emesis, and bloody stool for the past week. Patient is on Eliquis for clots in his bilateral legs and heart. Patient's wife at bedside claims he received neck surgery January 2024, and began experiencing worsening symptoms shortly before the surgery.  "Patient's wife had to stop the patient from an attempted suicide prior to arrival to the ED due to the severity of the symptoms within the past 3 days. Patient endorses head pain that feels as though there is a "c-clamp" squeezing his head accompanied by facial pain. Endorses weight loss over the past year. He endorses diarrhea. Wife claims he will go 4 days without a bowel movement, then have diarrhea. Patient has recently been in and out of [facility], and denies ever getting better. He received a B12 shot yesterday. He denies traveling out of the country recently, smoking, alcoholic consumption, or marijuana usage.     Patient is undergoing clearance for upcoming back surgery of T10 and T11 with Dr. Demarco.   Surgeon, Ciara LA: Jin Demarco MD  Patient has a psychiatrist   Patient recently underwent parasite testing     Medications: Amantadine, Hydroxyzine, Citalopram, and Eliquis.   Labs: ordered.  Radiology: ordered and independent interpretation performed.  ECG/medicine tests: ordered and independent interpretation performed.     Details: EKG Readings: (Independently Interpreted)   Initial Reading: No STEMI. Rhythm: Sinus Tachycardia. Heart Rate: 112. Ectopy: No Ectopy. Conduction: Normal. ST Segments: Normal ST Segments. T Waves: Normal. Axis: Normal. Clinical Impression: Sinus Tachycardia   1601     Risk  Decision regarding hospitalization.            Scribe Attestation:   Scribe #1: I performed the above scribed service and the documentation accurately describes the services I performed. I attest to the accuracy of the note.    Attending Attestation:           Physician Attestation for Scribe:  Physician Attestation Statement for Scribe #1: I, Bud Galo III, MD, reviewed documentation, as scribed by Bobbi Clark in my presence, and it is both accurate and complete.             ED Course as of 06/14/25 1750   Sat Jun 14, 2025   1734 Paged [facility] medicine.  [KL]      ED Course User " "Index  [KL] Bobbi Lucio                           Clinical Impression:  Final diagnoses:  [R07.9] Chest pain  [R25.2] Spasticity  [R45.851] Suicidal ideation (Primary)          ED Disposition Condition    Admit                       [1]   Social History  Tobacco Use    Smoking status: Every Day     Current packs/day: 0.25     Average packs/day: 0.5 packs/day for 45.4 years (22.2 ttl pk-yrs)     Types: Cigarettes     Start date: 1980    Smokeless tobacco: Never   Substance Use Topics    Alcohol use: Never    Drug use: Yes     Comment: "THC oil"        Bud Galo III, MD  06/14/25 7801    "

## 2025-06-14 NOTE — FIRST PROVIDER EVALUATION
"Medical screening examination initiated.  I have conducted a focused provider triage encounter, findings are as follows:    Brief history of present illness:  53 yo male presents to ED for evaluation of nausea and vomiting worsening over the past several weeks. Complains of headache. States weight loss. Reports chest pain. Reports being worked up by "doctors in [place]". States "no one is listening to me."    There were no vitals filed for this visit.    Pertinent physical exam:  Patient sitting in wheelchair.     Brief workup plan:  labs, EKG, CXR, IV    Preliminary workup initiated; this workup will be continued and followed by the physician or advanced practice provider that is assigned to the patient when roomed.  "

## 2025-06-15 LAB
ACCEPTIBLE SP GR UR QL: >1.03 (ref 1–1.03)
ALBUMIN SERPL-MCNC: 4 G/DL (ref 3.5–5)
ALBUMIN/GLOB SERPL: 1.1 RATIO (ref 1.1–2)
ALP SERPL-CCNC: 142 UNIT/L (ref 40–150)
ALT SERPL-CCNC: 27 UNIT/L (ref 0–55)
AMPHET UR QL SCN: NEGATIVE
ANION GAP SERPL CALC-SCNC: 9 MEQ/L
AST SERPL-CCNC: 16 UNIT/L (ref 11–45)
BARBITURATE SCN PRESENT UR: NEGATIVE
BENZODIAZ UR QL SCN: NEGATIVE
BILIRUB SERPL-MCNC: 1.5 MG/DL
BUN SERPL-MCNC: 12.4 MG/DL (ref 8.4–25.7)
CALCIUM SERPL-MCNC: 9.5 MG/DL (ref 8.4–10.2)
CANNABINOIDS UR QL SCN: POSITIVE
CHLORIDE SERPL-SCNC: 107 MMOL/L (ref 98–107)
CO2 SERPL-SCNC: 24 MMOL/L (ref 22–29)
COCAINE UR QL SCN: NEGATIVE
CREAT SERPL-MCNC: 0.88 MG/DL (ref 0.72–1.25)
CREAT/UREA NIT SERPL: 14
FENTANYL UR QL SCN: NEGATIVE
GFR SERPLBLD CREATININE-BSD FMLA CKD-EPI: >60 ML/MIN/1.73/M2
GLOBULIN SER-MCNC: 3.8 GM/DL (ref 2.4–3.5)
GLUCOSE SERPL-MCNC: 106 MG/DL (ref 74–100)
MDMA UR QL SCN: NEGATIVE
OHS QRS DURATION: 80 MS
OHS QTC CALCULATION: 502 MS
OPIATES UR QL SCN: POSITIVE
PCP UR QL: NEGATIVE
PH UR: 6 [PH] (ref 3–11)
POTASSIUM SERPL-SCNC: 5.1 MMOL/L (ref 3.5–5.1)
PROT SERPL-MCNC: 7.8 GM/DL (ref 6.4–8.3)
SODIUM SERPL-SCNC: 140 MMOL/L (ref 136–145)

## 2025-06-15 PROCEDURE — 80307 DRUG TEST PRSMV CHEM ANLYZR: CPT | Performed by: NURSE PRACTITIONER

## 2025-06-15 PROCEDURE — 25000003 PHARM REV CODE 250: Performed by: INTERNAL MEDICINE

## 2025-06-15 PROCEDURE — 11000001 HC ACUTE MED/SURG PRIVATE ROOM

## 2025-06-15 PROCEDURE — 63600175 PHARM REV CODE 636 W HCPCS: Performed by: STUDENT IN AN ORGANIZED HEALTH CARE EDUCATION/TRAINING PROGRAM

## 2025-06-15 PROCEDURE — 25000003 PHARM REV CODE 250: Performed by: NURSE PRACTITIONER

## 2025-06-15 PROCEDURE — 25000003 PHARM REV CODE 250: Performed by: STUDENT IN AN ORGANIZED HEALTH CARE EDUCATION/TRAINING PROGRAM

## 2025-06-15 PROCEDURE — 99223 1ST HOSP IP/OBS HIGH 75: CPT | Mod: ,,, | Performed by: PSYCHIATRY & NEUROLOGY

## 2025-06-15 PROCEDURE — 80053 COMPREHEN METABOLIC PANEL: CPT | Performed by: STUDENT IN AN ORGANIZED HEALTH CARE EDUCATION/TRAINING PROGRAM

## 2025-06-15 PROCEDURE — 36415 COLL VENOUS BLD VENIPUNCTURE: CPT | Performed by: STUDENT IN AN ORGANIZED HEALTH CARE EDUCATION/TRAINING PROGRAM

## 2025-06-15 PROCEDURE — 25000003 PHARM REV CODE 250: Performed by: PSYCHIATRY & NEUROLOGY

## 2025-06-15 RX ORDER — QUETIAPINE FUMARATE 100 MG/1
300 TABLET, FILM COATED ORAL NIGHTLY
Status: DISCONTINUED | OUTPATIENT
Start: 2025-06-16 | End: 2025-06-18 | Stop reason: HOSPADM

## 2025-06-15 RX ORDER — CARBIDOPA AND LEVODOPA 10; 100 MG/1; MG/1
1 TABLET ORAL 3 TIMES DAILY
Status: DISCONTINUED | OUTPATIENT
Start: 2025-06-15 | End: 2025-06-18 | Stop reason: HOSPADM

## 2025-06-15 RX ORDER — BUTALBITAL, ACETAMINOPHEN AND CAFFEINE 50; 325; 40 MG/1; MG/1; MG/1
1 TABLET ORAL EVERY 4 HOURS PRN
Status: DISCONTINUED | OUTPATIENT
Start: 2025-06-15 | End: 2025-06-18 | Stop reason: HOSPADM

## 2025-06-15 RX ORDER — QUETIAPINE FUMARATE 100 MG/1
100 TABLET, FILM COATED ORAL NIGHTLY
Status: COMPLETED | OUTPATIENT
Start: 2025-06-15 | End: 2025-06-15

## 2025-06-15 RX ADMIN — APIXABAN 5 MG: 5 TABLET, FILM COATED ORAL at 08:06

## 2025-06-15 RX ADMIN — ONDANSETRON 4 MG: 2 INJECTION INTRAMUSCULAR; INTRAVENOUS at 03:06

## 2025-06-15 RX ADMIN — VENLAFAXINE HYDROCHLORIDE 150 MG: 150 CAPSULE, EXTENDED RELEASE ORAL at 08:06

## 2025-06-15 RX ADMIN — CARBIDOPA AND LEVODOPA 1 TABLET: 10; 100 TABLET ORAL at 08:06

## 2025-06-15 RX ADMIN — CARBIDOPA AND LEVODOPA 1 TABLET: 10; 100 TABLET ORAL at 05:06

## 2025-06-15 RX ADMIN — TAMSULOSIN HYDROCHLORIDE 0.4 MG: 0.4 CAPSULE ORAL at 08:06

## 2025-06-15 RX ADMIN — SUCRALFATE 1 G: 1 TABLET ORAL at 08:06

## 2025-06-15 RX ADMIN — CYCLOBENZAPRINE 10 MG: 10 TABLET, FILM COATED ORAL at 05:06

## 2025-06-15 RX ADMIN — HYDROCODONE BITARTRATE AND ACETAMINOPHEN 1 TABLET: 10; 325 TABLET ORAL at 05:06

## 2025-06-15 RX ADMIN — AMANTADINE HYDROCHLORIDE 200 MG: 100 CAPSULE, LIQUID FILLED ORAL at 08:06

## 2025-06-15 RX ADMIN — ESCITALOPRAM OXALATE 20 MG: 10 TABLET ORAL at 08:06

## 2025-06-15 RX ADMIN — HYDROCODONE BITARTRATE AND ACETAMINOPHEN 1 TABLET: 10; 325 TABLET ORAL at 08:06

## 2025-06-15 RX ADMIN — CYCLOBENZAPRINE 10 MG: 10 TABLET, FILM COATED ORAL at 08:06

## 2025-06-15 RX ADMIN — QUETIAPINE FUMARATE 100 MG: 100 TABLET ORAL at 08:06

## 2025-06-15 RX ADMIN — QUETIAPINE FUMARATE 200 MG: 200 TABLET, EXTENDED RELEASE ORAL at 08:06

## 2025-06-15 RX ADMIN — HYDROXYZINE HYDROCHLORIDE 50 MG: 25 TABLET, FILM COATED ORAL at 08:06

## 2025-06-15 NOTE — PROGRESS NOTES
[facility]  Hospital Medicine Progress Note        Chief Complaint: Inpatient Follow-up for     HPI:   54-year-old male with a past medical history of bipolar disorder, spasticity, left DVT and PE (on Eliquis), cord compression/myelomalacia former longstanding methamphetamine use, tobacco dependence, marijuana use who presents after holding a gun to his head.  Patient was PEC'd in the emergency room.  Patient states he has been experiencing unintentional weight loss and several months of vomiting, all-over pain and headache that led to him wanting to end it all today. Denies HI     Pain management Dr. Sheehan in [place].  Psychiatry Dr. Garibay.   Neurology: Dr. Ivelisse Siddiqui     At baseline the patient lives with his wife and ambulates with a cane.    Interval Hx:   Patient was seen and evaluate at bedside, oxygenating well on RA. NSG has signed off as patient is being worked up outpatient by NS. Neurology consulted as patient claims to have Parkinson's however there is no medications on file; possibly this is Parkinson secondary to chronic meth abuse.  Nausea and vomiting may resolve if started on appropriate medications.  Additionally we will consult GI as patient has chronic N/V with alarming symptoms of severe weight loss.  Psych following for suicidal ideation.    Case was discussed with patient's nurse and  on the floor.    Objective/physical exam:  General: In no acute distress, afebrile  Chest: Clear to auscultation bilaterally  Heart: RRR, +S1, S2, no appreciable murmur  Abdomen: Soft, nontender, BS +  MSK: Warm, no lower extremity edema, no clubbing or cyanosis  Neurologic: Alert and oriented x4, Cranial nerve II-XII intact, Strength 5/5 in all 4 extremities    VITAL SIGNS: 24 HRS MIN & MAX LAST   Temp  Min: 97.9 °F (36.6 °C)  Max: 98.8 °F (37.1 °C) 98.8 °F (37.1 °C)   BP  Min: 136/92  Max: 180/102 (!) 154/94   Pulse  Min: 74  Max: 108  74   Resp   Min: 18  Max: 22 18   SpO2  Min: 95 %  Max: 100 % 95 %     I have reviewed the following labs:  Recent Labs   Lab 06/14/25  1625   WBC 11.02   RBC 6.21*   HGB 17.9   HCT 54.3*   MCV 87.4   MCH 28.8   MCHC 33.0   RDW 17.2*      MPV 10.3     Recent Labs   Lab 06/14/25  1647 06/15/25  0627    140   K 3.5 5.1    107   CO2 23 24   BUN 12.0 12.4   CREATININE 0.93 0.88   * 106*   CALCIUM 9.7 9.5   MG 1.70  --    ALBUMIN 4.2 4.0   PROT 8.4* 7.8   ALKPHOS 159* 142   ALT 29 27   AST 16 16   BILITOT 1.4 1.5     Microbiology Results (last 7 days)       Procedure Component Value Units Date/Time    Stool Culture [[id number]]     Order Status: Sent Specimen: Stool     Clostridium Diff Toxin, A & B, EIA [[id number]]     Order Status: Sent Specimen: Stool              See below for Radiology    Assessment/Plan:  History of Parkinson's disease  -as per patient  -on amantadine at home  -possibly due to chronic meth use  -neurology consulted to see if patient should be started on carbidopa/levodopa.    Protein calorie malnutrition   -secondary to persistent nausea/vomiting   -patient states he has lost 167 lb unintentionally ?   -dietary consulted  -GI consulted as patient has alarming symptoms; possibly nausea/vomiting and significant weight loss may be due to inappropriately treated Parkinson's disease?  -having a EGD performed in the past; was dilated at the time    SI  History of bipolar disorder  -1:1, PEC'ed in the ED on 6/14  -Psychiatry consulted, eval pending  -continue with escitalopram, hydroxyzine, venlafaxine, quetiapine     left DVT and PE  -c/w Eliquis     Spasticity  Cord compression/myelomalacia  -followed by Dr. Venegas in [place]  -MRI from 3/7 of thoracic spine showed: Severe spinal canal stenosis at T10-T11 with associated cord compression and   intramedullary T2 hyperintense signal compatible with compressive myelopathy  -cervical spine MRI showed Moderate spinal canal stenosis at C2-C3    -neurosurgery consulted but he is also established in [place] with spine surgeon, Dr. Jin Demarco  -NSG has signed off  -PT OT eval pending     Elevated ALP   -GGT within normal limits   -right upper quadrant ultrasound with cholelithiasis, no evidence of cholecystitis     Headache   -Fioricet trial   -if no improvement may need further evaluation with imaging     Tobacco dependence  Marijuana use   -states he quit May 8th, previously half pack per day     History of former longstanding methamphetamine use       VTE prophylaxis: lovenox  Patient condition:  Stable  Anticipated discharge and Disposition:   placement?      All diagnosis and differential diagnosis have been reviewed; assessment and plan has been documented; I have personally reviewed the labs and test results that are presently available; I have reviewed the patients medication list; I have reviewed the consulting providers response and recommendations. I have reviewed or attempted to review medical records based upon their availability    All of the patient's questions have been  addressed and answered. Patient's is agreeable to the above stated plan. I will continue to monitor closely and make adjustments to medical management as needed.    Portions of this note dictated using EMR integrated voice recognition software, and may be subject to voice recognition errors not corrected at proofreading. Please contact writer for clarification if needed.   _____________________________________________________________________    Malnutrition Status:  Nutrition consulted. Most recent weight and BMI monitored-     Measurements:  Wt Readings from Last 1 Encounters:   06/14/25 79.4 kg (175 lb)   Body mass index is 22.47 kg/m².    Patient has been screened and assessed by RD.    Malnutrition Type:  Context:    Level:      Malnutrition Characteristic Summary:       Interventions/Recommendations (treatment strategy):        Scheduled Med:   amantadine HCL  200 mg  Oral Daily    apixaban  5 mg Oral BID    EScitalopram oxalate  20 mg Oral Daily    hydrOXYzine  50 mg Oral BID    magnesium sulfate 2 g IVPB  2 g Intravenous Once    QUEtiapine  200 mg Oral Daily    sucralfate  1 g Oral BID    tamsulosin  1 capsule Oral Daily    venlafaxine  150 mg Oral Daily      Continuous Infusions:     PRN Meds:    Current Facility-Administered Medications:     acetaminophen, 650 mg, Oral, Q4H PRN    albuterol-ipratropium, 3 mL, Nebulization, Q6H PRN    aluminum-magnesium hydroxide-simethicone, 30 mL, Oral, QID PRN    bisacodyL, 10 mg, Rectal, Daily PRN    butalbital-acetaminophen-caffeine -40 mg, 1 tablet, Oral, Q4H PRN    cyclobenzaprine, 10 mg, Oral, TID PRN    dextrose 50%, 12.5 g, Intravenous, PRN    dextrose 50%, 25 g, Intravenous, PRN    glucagon (human recombinant), 1 mg, Intramuscular, PRN    glucose, 16 g, Oral, PRN    glucose, 24 g, Oral, PRN    HYDROcodone-acetaminophen, 1 tablet, Oral, Q6H PRN    melatonin, 9 mg, Oral, Nightly PRN    naloxone, 0.02 mg, Intravenous, PRN    ondansetron, 8 mg, Oral, Q8H PRN    ondansetron, 4 mg, Intravenous, Q8H PRN    polyethylene glycol, 17 g, Oral, TID PRN    simethicone, 1 tablet, Oral, QID PRN    sodium chloride 0.9%, 10 mL, Intravenous, PRN    sumatriptan, 50 mg, Oral, Daily PRN     Radiology:  I have personally reviewed the following imaging and agree with the radiologist.     US Abdomen Limited  Narrative: EXAMINATION:  US ABDOMEN LIMITED    CLINICAL HISTORY:  Elevated liver function test    TECHNIQUE:  Multiple real-time transverse and longitudinal sections were performed of the right abdomen by the sonographer. Select images were submitted for review.    COMPARISON:  CT abdomen pelvis July 31, 2023.    FINDINGS:  Liver is of unremarkable echotexture with normal contour and size. There was no delineation of discrete hepatic cystic or solid mass. Hepatic maximum diameter of 15.3 cm. There was unremarkable hepatopedal flow within the  portal vein.  Pancreas obscured by overlying bowel gas.    Gallbladder lumen contains several echogenic foci consistent with cholelithiasis.  Largest calculus measures 2.0 x 1.2 cm.  Gallbladder wall thickness is within normal limits. Common bile duct caliber of 3 point mm is within normal limits for the age. Sonographer reported negative Lake's sign.    Inferior vena cava and abdominal aorta are not visualized due to overlying bowel gas    Normally located right kidney length measures 9.8 x 5.8 x 5.0 cm. Right renal corticomedullary differentiation is unremarkable. No evidence of hydronephrosis.  Impression: 1.  Multiple gallstones without sonographic findings of acute cholecystitis.    2.  Details of other findings above.    Electronically signed by: Omari Mcclain  Date:    06/14/2025  Time:    21:18  X-Ray Chest 1 View  Narrative: EXAMINATION:  XR CHEST 1 VIEW    CLINICAL HISTORY:  Chest pain, unspecified    TECHNIQUE:  One view    COMPARISON:  None available.    FINDINGS:  Cardiopericardial silhouette is within normal limits.  No acute dense focal or segmental consolidation, congestive process, pleural effusions or pneumothorax.  Partially visualized cervicothoracic fusions.  Impression: No acute cardiopulmonary process identified.    Electronically signed by: Omari Mcclain  Date:    06/14/2025  Time:    17:04      Valeria Ponce MD  Department of Hospital Medicine   [facility]   06/15/2025

## 2025-06-15 NOTE — PLAN OF CARE
Problem: Adult Inpatient Plan of Care  Goal: Plan of Care Review  Outcome: Progressing  Goal: Patient-Specific Goal (Individualized)  Outcome: Progressing  Goal: Absence of Hospital-Acquired Illness or Injury  Outcome: Progressing  Goal: Optimal Comfort and Wellbeing  Outcome: Progressing  Goal: Readiness for Transition of Care  Outcome: Progressing     Problem: Suicide Risk  Goal: Absence of Self-Harm  Outcome: Progressing     Problem: Infection  Goal: Absence of Infection Signs and Symptoms  Outcome: Progressing     Problem: Violence Risk or Actual  Goal: Anger and Impulse Control  Outcome: Progressing
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
## 2025-06-15 NOTE — H&P
"[facility] - Emergency [facility] MEDICINE - H&P ADMISSION NOTE      Patient Name: Aram Thurston  MRN: [id number]  Patient Class: IP- Inpatient   Admission Date: 6/14/2025   Admitting Physician: PHIL Service   Attending Physician: Thairy G Reyes, DO  Primary Care Provider: Jane, Primary Doctor  Face-to-Face encounter date: 06/14/2025        CHIEF COMPLAINT     Chief Complaint   Patient presents with    Multiple Complaints     Pt arrives with multiple complaints spanning 1 year. Reports N/V x 1 week, weight loss x 1 year, headache, "bugs crawling underneath skin". + CP. Seen in [place] for similar issues. Hx CVA.        HISTORY OF PRESENTING ILLNESS   54-year-old male with a past medical history of bipolar disorder, spasticity, left DVT and PE (on Eliquis), cord compression/myelomalacia former longstanding methamphetamine use, tobacco dependence, marijuana use who presents after holding a gun to his head.  Patient was PEC'd in the emergency room.  Patient states he has been experiencing unintentional weight loss and several months of vomiting, all-over pain and headache that led to him wanting to end it all today. Denies HI    Pain management Dr. Sheehan in [place].  Psychiatry Dr. Garibay.   Neurology: Dr. Ivelisse Siddiqui    At baseline the patient lives with his wife and ambulates with a cane.  PAST MEDICAL HISTORY     Past Medical History:   Diagnosis Date    Anxiety     Arthritis     Asthma     Back pain     Balance problems     Cervical herniated disc     Depression     Diabetes mellitus     Dry cough     DVT (deep venous thrombosis)     Enlarged prostate     Gout     Hemoptysis     HLD (hyperlipidemia)     Insomnia     Leg weakness     Lung mass     Migraines     Neck pain     Neuropathy     Obesity     PONV (postoperative nausea and vomiting)     Pulmonary embolism     Spinal stenosis     TIA (transient ischemic attack)     Use of cane as ambulatory aid     Walker as ambulation " aid        PAST SURGICAL HISTORY     Past Surgical History:   Procedure Laterality Date    ABCESS DRAINAGE      scrotal cyst    BRONCHIAL IRRIGATION  11/1/2023    Procedure: IRRIGATION, BRONCHUS;  Surgeon: ERIS George MD;  Location: [facility] BRONCHOSCOPY LAB;  Service: Pulmonary;;  RLL    CERVICAL FUSION      COLONOSCOPY W/ POLYPECTOMY  10/2023    FLEXIBLE BRONCHOSCOPY N/A 11/1/2023    Procedure: BRONCHOSCOPY, FIBEROPTIC;  Surgeon: ERIS George MD;  Location: [facility] BRONCHOSCOPY LAB;  Service: Pulmonary;  Laterality: N/A;    LUMBAR FUSION      NASAL POLYP EXCISION      PARTIAL KNEE ARTHROPLASTY Left     ROTATOR CUFF REPAIR Right     TYMPANOPLASTY Right        FAMILY HISTORY   Reviewed and noncontributory to this case    SOCIAL HISTORY   Social History[1]    HOME MEDICATIONS     Prior to Admission medications    Medication Sig Start Date End Date Taking? Authorizing Provider   cyclobenzaprine (FLEXERIL) 10 MG tablet Take 10 mg by mouth 3 (three) times daily as needed. 5/28/25  Yes Provider, Historical   ELIQUIS 5 mg Tab take one Tablet by mouth two times a day BEFORE MEALS 7/31/24  Yes Provider, Historical   EScitalopram oxalate (LEXAPRO) 20 MG tablet Take 20 mg by mouth once daily. 2/4/25  Yes Provider, Historical   HYDROcodone-acetaminophen (NORCO)  mg per tablet Take 1 tablet by mouth every 6 (six) hours as needed. 6/29/23  Yes Provider, Historical   hydrOXYzine (ATARAX) 50 MG tablet Take 50 mg by mouth 2 (two) times daily. 12/16/24  Yes Provider, Historical   QUEtiapine (SEROQUEL XR) 200 MG Tb24 Take 200 mg by mouth once daily. 12/16/24  Yes Provider, Historical   sucralfate (CARAFATE) 1 gram tablet Take 1 g by mouth 2 (two) times daily. 7/31/24  Yes Provider, Historical   sumatriptan (IMITREX) 50 MG tablet Take 1 tablet by mouth daily as needed for Migraine. 6/20/23  Yes Provider, Historical   temazepam (RESTORIL) 30 mg capsule Take 30 mg by mouth nightly as needed.   Yes Provider, Historical    tiZANidine (ZANAFLEX) 4 MG tablet Take 4 mg by mouth 2 (two) times a day. 9/20/23  Yes Provider, Historical   venlafaxine (EFFEXOR-XR) 150 MG Cp24 TAKE ONE CAPSULE BY MOUTH EVERY DAY FOR ANXIETY AND DEPRESSION 7/31/24  Yes Provider, Historical   amantadine HCL (SYMMETREL) 100 mg capsule Take 200 mg by mouth once daily.   Yes Provider, Historical   ondansetron (ZOFRAN-ODT) 4 MG TbDL Take 4 mg by mouth every 8 (eight) hours as needed for Nausea/Vomiting. 10/26/23   Provider, Historical   tamsulosin (FLOMAX) 0.4 mg Cap Take 1 capsule by mouth once daily.    Provider, Historical   allopurinoL (ZYLOPRIM) 300 MG tablet Take 300 mg by mouth daily as needed. 6/1/23 6/14/25  Provider, Historical   amitriptyline (ELAVIL) 75 MG tablet Take 75 mg by mouth 2 (two) times daily as needed. 7/10/23 6/14/25  Provider, Historical   aspirin (ECOTRIN) 81 MG EC tablet Take 81 mg by mouth once daily.  6/14/25  Provider, Historical   atorvastatin (LIPITOR) 40 MG tablet Take 40 mg by mouth once daily. 10/23/23 6/14/25  Provider, Historical   brivaracetam (BRIVIACT) 50 mg Tab TAKE ONE TABLET BY MOUTH TWICE DAILY Oral for 30 Days  6/14/25  Provider, Historical   metFORMIN (GLUCOPHAGE) 1000 MG tablet Take 1,000 mg by mouth 2 (two) times a day. 9/20/23 6/14/25  Provider, Historical   temazepam (RESTORIL) 30 mg capsule Take 30 mg by mouth every evening. 8/24/23 6/14/25  Provider, Historical   TRULICITY 1.5 mg/0.5 mL pen injector Inject 1.5 mg every week by subcutaneous route for 30 days. 5/8/24 6/14/25  Provider, Historical       ALLERGIES   Buprenorphine-naloxone, Tylox [oxycodone-acetaminophen], Iodinated contrast media, and Nitrous oxide  REVIEW OF SYSTEMS   Except as documented above, all other systems reviewed and negative    PHYSICAL EXAM     Vitals:    06/14/25 1903   BP: 137/89   Pulse: 83   Resp: 18   Temp: 98.4 °F (36.9 °C)      General:  In no acute distress, resting comfortably  Head and neck:  Atraumatic, normocephalic, moist  mucous membranes, supple neck  Chest:  Clear to auscultation bilaterally  Heart:  S1, S2, no appreciable murmur  Abdomen:  Soft, nontender, BS +  MSK:  Warm, no lower extremity edema, no clubbing or cyanosis  Neuro:  Alert and oriented x4, spasticity Integumentary:  No obvious skin rash  Psychiatry:  Frustrated  ASSESSMENT AND PLAN   SI  History of bipolar disorder  -1:1, PEC'ed in the ED on 6/14  -Psychiatry consulted  -continue with escitalopram, hydroxyzine, venlafaxine, quetiapine    left DVT and PE  -c/w Eliquis    Spasticity  Cord compression/myelomalacia  -followed by Dr. Venegas in [place]  -MRI from 3/7 of thoracic spine showed: Severe spinal canal stenosis at T10-T11 with associated cord compression and   intramedullary T2 hyperintense signal compatible with compressive myelopathy  -cervical spine MRI showed Moderate spinal canal stenosis at C2-C3   -neurosurgery consulted but he is also established in [place] with spine surgeon, Dr. Jin Demarco    Elevated ALP   -GGT within normal limits   -right upper quadrant ultrasound with cholelithiasis, no evidence of cholecystitis    Protein calorie malnutrition   -secondary to persistent nausea/vomiting   -patient states he has lost 167 lb unintentionally ?   -dietary consulted  -+/- GI consult if he is unable to tolerate diet while in patient     Headache   -Fioricet trial   -if no improvement may need further evaluation with imaging    Tobacco dependence  Marijuana use   -states he quit May 8th, previously half pack per day    History of former longstanding methamphetamine use    DVT prophylaxis:  Eliquis  Screening for Social Drivers for health:  Patient screened for food insecurity, housing instability, transportation needs, utility difficulties, and interpersonal safety (select all that apply as identified as concern)  []Housing or Food  []Transportation Needs  []Utility Difficulties  []Interpersonal  safety  [x]None  __________________________________________________________________  LABS/MICRO/MEDS/DIAGNOSTICS       LABS  Recent Labs     06/14/25  1647      K 3.5   CO2 23   BUN 12.0   CREATININE 0.93   CALCIUM 9.7   ALKPHOS 159*   AST 16   ALT 29   ALBUMIN 4.2     Recent Labs     06/14/25  1625   WBC 11.02   RBC 6.21*   HCT 54.3*   MCV 87.4          MICROBIOLOGY  Microbiology Results (last 7 days)       ** No results found for the last 168 hours. **            MEDICATIONS     INFUSIONS      DIAGNOSTIC TESTS  X-Ray Chest 1 View   Final Result      No acute cardiopulmonary process identified.         Electronically signed by: Omari Mcclain   Date:    06/14/2025   Time:    17:04             Patient information was obtained from patient, patient's family, past medical records and ER records.   All diagnosis and differential diagnosis have been reviewed; assessment and plan has been documented. I have personally reviewed the labs and test results that are presently available; I have reviewed the patients medication list. I have reviewed the consulting providers response and recommendations. I have reviewed or attempted to review medical records based upon their availability.  All of the patient's questions have been addressed and answered. Patient's is agreeable to the above stated plan. I will continue to monitor closely and make adjustments to medical management as needed.  This note was created using [employer] voice recognition software that occasionally misinterpreted phrases or words.  Please contact me if any questions may rise regarding documentation to clarify verbiage.        Thairy G Reyes,    Internal Medicine  Department of Hospital Medicine  [facility] - Emergency Dept         [1]   Social History  Socioeconomic History    Marital status:    Tobacco Use    Smoking status: Every Day     Current packs/day: 0.25     Average packs/day: 0.5 packs/day for 45.4 years (22.2 ttl  "pk-yrs)     Types: Cigarettes     Start date: 1980    Smokeless tobacco: Never   Substance and Sexual Activity    Alcohol use: Never    Drug use: Yes     Comment: "THC oil"     Social Drivers of Health     Financial Resource Strain: Low Risk  (6/7/2023)    Received from [facility]    Overall Financial Resource Strain (CARDIA)     Difficulty of Paying Living Expenses: Not hard at all   Transportation Needs: No Transportation Needs (6/7/2023)    Received from [facility]    PRAPARE - Transportation     Lack of Transportation (Medical): No     Lack of Transportation (Non-Medical): No   Housing Stability: Unknown (6/7/2023)    Received from [facility]    Housing Stability Vital Sign     Unable to Pay for Housing in the Last Year: No     "

## 2025-06-15 NOTE — CONSULTS
Re parkinsons    Hpi  H/o polysubstance use  1-2 y of worsening muscle rigidity and uncontrollable movements  On amantadine  No obvious pill rolling tremor  'everything hurts'  Admitted for suicidal ideation    The remainder of the 14 system ROS is noncontributory or negative unless mentioned/reviewed above.  Past Medical History:   Diagnosis Date    Anxiety     Arthritis     Asthma     Back pain     Balance problems     Cervical herniated disc     Depression     Diabetes mellitus     Dry cough     DVT (deep venous thrombosis)     Enlarged prostate     Gout     Hemoptysis     HLD (hyperlipidemia)     Insomnia     Leg weakness     Lung mass     Migraines     Neck pain     Neuropathy     Obesity     PONV (postoperative nausea and vomiting)     Pulmonary embolism     Spinal stenosis     TIA (transient ischemic attack)     Use of cane as ambulatory aid     Walker as ambulation aid      Social History[1]  Past Surgical History:   Procedure Laterality Date    ABCESS DRAINAGE      scrotal cyst    BRONCHIAL IRRIGATION  11/1/2023    Procedure: IRRIGATION, BRONCHUS;  Surgeon: ERIS George MD;  Location: [facility] BRONCHOSCOPY LAB;  Service: Pulmonary;;  RLL    CERVICAL FUSION      COLONOSCOPY W/ POLYPECTOMY  10/2023    FLEXIBLE BRONCHOSCOPY N/A 11/1/2023    Procedure: BRONCHOSCOPY, FIBEROPTIC;  Surgeon: ERIS George MD;  Location: [facility] BRONCHOSCOPY LAB;  Service: Pulmonary;  Laterality: N/A;    LUMBAR FUSION      NASAL POLYP EXCISION      PARTIAL KNEE ARTHROPLASTY Left     ROTATOR CUFF REPAIR Right     TYMPANOPLASTY Right      Scheduled Meds:   amantadine HCL  200 mg Oral Daily    apixaban  5 mg Oral BID    carbidopa-levodopa  mg  1 tablet Oral TID    EScitalopram oxalate  20 mg Oral Daily    hydrOXYzine  50 mg Oral BID    magnesium sulfate 2 g IVPB  2 g Intravenous Once    QUEtiapine  100 mg Oral QHS    [START ON 6/16/2025] QUEtiapine  300 mg Oral QHS    sucralfate  1 g Oral BID    tamsulosin  1 capsule Oral Daily     venlafaxine  150 mg Oral Daily     Continuous Infusions:  PRN Meds:.  Current Facility-Administered Medications:     acetaminophen, 650 mg, Oral, Q4H PRN    albuterol-ipratropium, 3 mL, Nebulization, Q6H PRN    aluminum-magnesium hydroxide-simethicone, 30 mL, Oral, QID PRN    bisacodyL, 10 mg, Rectal, Daily PRN    butalbital-acetaminophen-caffeine -40 mg, 1 tablet, Oral, Q4H PRN    cyclobenzaprine, 10 mg, Oral, TID PRN    dextrose 50%, 12.5 g, Intravenous, PRN    dextrose 50%, 25 g, Intravenous, PRN    glucagon (human recombinant), 1 mg, Intramuscular, PRN    glucose, 16 g, Oral, PRN    glucose, 24 g, Oral, PRN    HYDROcodone-acetaminophen, 1 tablet, Oral, Q6H PRN    melatonin, 9 mg, Oral, Nightly PRN    naloxone, 0.02 mg, Intravenous, PRN    ondansetron, 8 mg, Oral, Q8H PRN    ondansetron, 4 mg, Intravenous, Q8H PRN    polyethylene glycol, 17 g, Oral, TID PRN    simethicone, 1 tablet, Oral, QID PRN    sodium chloride 0.9%, 10 mL, Intravenous, PRN    sumatriptan, 50 mg, Oral, Daily PRN  Vitals:    06/15/25 1122   BP: 116/78   Pulse: 81   Resp:    Temp: 98 °F (36.7 °C)       Mental Status: Alert and oriented x3. Language is fluent with good comprehension.    Cranial Nerve: Pupils are equal, round, and reactive to light. Visual fields are intact to confrontation. Normal fundi. Ocular movements are intact. Face is symmetric at rest and with activation with intact sensation throughout. Hearing intact to finger rub bilaterally. Muscles of tongue and palate activate symmetrically. No dysarthria. Strength is full in sternocleidomastoid and trapezius bilaterally.    Motor: Muscle bulk and tone are normal. Strength is 5/5 in all four extremities both proximally and distally. Intact fine motor movements bilaterally. There is no pronator drift or satelliting on arm roll.  4 ext rigidity  4 ext athetosis    Sensory: Sensation is intact to light touch, pinprick, vibration, and proprioception throughout. Romberg is  "negative.    Reflexes: 2+ and symmetric at the biceps, triceps, brachioradialis, patella, and Achilles bilaterally. Plantar response is flexor bilaterally.    Coordination: No dysmetria on finger-nose-finger or heel-knee-shin. Normal rapid alternating movements. Fast finger tapping with normal amplitude and speed.    Gait: Not tested      Imp  Parkinsonism  Will not be able to prove idiopathic PD vs drug induced  [place] trying sinemet; see orders       [1]   Social History  Socioeconomic History    Marital status:    Tobacco Use    Smoking status: Every Day     Current packs/day: 0.25     Average packs/day: 0.5 packs/day for 45.5 years (22.2 ttl pk-yrs)     Types: Cigarettes     Start date: 1980    Smokeless tobacco: Never   Substance and Sexual Activity    Alcohol use: Never    Drug use: Yes     Comment: "THC oil"     Social Drivers of Health     Financial Resource Strain: Low Risk  (6/7/2023)    [facility]    Overall Financial Resource Strain (CARDIA)     Difficulty of Paying Living Expenses: Not hard at all   Transportation Needs: No Transportation Needs (6/7/2023)    Received from [facility]    PRAPARE - Transportation     Lack of Transportation (Medical): No     Lack of Transportation (Non-Medical): No   Housing Stability: Unknown (6/7/2023)    Received from [facility]    Housing Stability Vital Sign     Unable to Pay for Housing in the Last Year: No     "

## 2025-06-16 PROBLEM — F17.200 TOBACCO DEPENDENCY: Status: ACTIVE | Noted: 2025-06-16

## 2025-06-16 PROBLEM — R25.1 TREMOR OF BOTH HANDS: Status: ACTIVE | Noted: 2025-06-16

## 2025-06-16 LAB
ABS NEUT (OLG): 3.69 X10(3)/MCL (ref 2.1–9.2)
ANION GAP SERPL CALC-SCNC: 9 MEQ/L
BASOPHILS NFR BLD MANUAL: 0.06 X10(3)/MCL (ref 0–0.2)
BASOPHILS NFR BLD MANUAL: 1 %
BUN SERPL-MCNC: 10.5 MG/DL (ref 8.4–25.7)
CALCIUM SERPL-MCNC: 9.2 MG/DL (ref 8.4–10.2)
CHLORIDE SERPL-SCNC: 109 MMOL/L (ref 98–107)
CO2 SERPL-SCNC: 25 MMOL/L (ref 22–29)
CREAT SERPL-MCNC: 0.78 MG/DL (ref 0.72–1.25)
CREAT/UREA NIT SERPL: 13
EOSINOPHIL NFR BLD MANUAL: 0.19 X10(3)/MCL (ref 0–0.9)
EOSINOPHIL NFR BLD MANUAL: 3 %
ERYTHROCYTE [DISTWIDTH] IN BLOOD BY AUTOMATED COUNT: 16.3 % (ref 11.5–17)
GFR SERPLBLD CREATININE-BSD FMLA CKD-EPI: >60 ML/MIN/1.73/M2
GLUCOSE SERPL-MCNC: 93 MG/DL (ref 74–100)
HCT VFR BLD AUTO: 45.6 % (ref 42–52)
HGB BLD-MCNC: 14.8 G/DL (ref 14–18)
INSTRUMENT WBC (OLG): 6.26 X10(3)/MCL
LYMPHOCYTES NFR BLD MANUAL: 2.07 X10(3)/MCL (ref 0.6–4.6)
LYMPHOCYTES NFR BLD MANUAL: 33 %
MAGNESIUM SERPL-MCNC: 1.9 MG/DL (ref 1.6–2.6)
MCH RBC QN AUTO: 28.6 PG (ref 27–31)
MCHC RBC AUTO-ENTMCNC: 32.5 G/DL (ref 33–36)
MCV RBC AUTO: 88 FL (ref 80–94)
MONOCYTES NFR BLD MANUAL: 0.25 X10(3)/MCL (ref 0.1–1.3)
MONOCYTES NFR BLD MANUAL: 4 %
NEUTROPHILS NFR BLD MANUAL: 59 %
PHOSPHATE SERPL-MCNC: 3.6 MG/DL (ref 2.3–4.7)
PLATELET # BLD AUTO: 197 X10(3)/MCL (ref 130–400)
PLATELET # BLD EST: NORMAL 10*3/UL
PMV BLD AUTO: 9.3 FL (ref 7.4–10.4)
POTASSIUM SERPL-SCNC: 4.7 MMOL/L (ref 3.5–5.1)
RBC # BLD AUTO: 5.18 X10(6)/MCL (ref 4.7–6.1)
RBC MORPH BLD: NORMAL
SODIUM SERPL-SCNC: 143 MMOL/L (ref 136–145)
WBC # BLD AUTO: 6.26 X10(3)/MCL (ref 4.5–11.5)

## 2025-06-16 PROCEDURE — 83735 ASSAY OF MAGNESIUM: CPT | Performed by: STUDENT IN AN ORGANIZED HEALTH CARE EDUCATION/TRAINING PROGRAM

## 2025-06-16 PROCEDURE — 25000003 PHARM REV CODE 250: Performed by: PSYCHIATRY & NEUROLOGY

## 2025-06-16 PROCEDURE — 99231 SBSQ HOSP IP/OBS SF/LOW 25: CPT | Mod: ,,, | Performed by: PSYCHIATRY & NEUROLOGY

## 2025-06-16 PROCEDURE — 25000003 PHARM REV CODE 250: Performed by: STUDENT IN AN ORGANIZED HEALTH CARE EDUCATION/TRAINING PROGRAM

## 2025-06-16 PROCEDURE — 25000003 PHARM REV CODE 250: Performed by: INTERNAL MEDICINE

## 2025-06-16 PROCEDURE — 85025 COMPLETE CBC W/AUTO DIFF WBC: CPT | Performed by: STUDENT IN AN ORGANIZED HEALTH CARE EDUCATION/TRAINING PROGRAM

## 2025-06-16 PROCEDURE — 25000003 PHARM REV CODE 250: Performed by: NURSE PRACTITIONER

## 2025-06-16 PROCEDURE — 36415 COLL VENOUS BLD VENIPUNCTURE: CPT | Performed by: STUDENT IN AN ORGANIZED HEALTH CARE EDUCATION/TRAINING PROGRAM

## 2025-06-16 PROCEDURE — 80048 BASIC METABOLIC PNL TOTAL CA: CPT | Performed by: STUDENT IN AN ORGANIZED HEALTH CARE EDUCATION/TRAINING PROGRAM

## 2025-06-16 PROCEDURE — 11000001 HC ACUTE MED/SURG PRIVATE ROOM

## 2025-06-16 PROCEDURE — 84100 ASSAY OF PHOSPHORUS: CPT | Performed by: STUDENT IN AN ORGANIZED HEALTH CARE EDUCATION/TRAINING PROGRAM

## 2025-06-16 RX ORDER — SENNOSIDES 8.6 MG/1
8.6 TABLET ORAL DAILY
Status: DISCONTINUED | OUTPATIENT
Start: 2025-06-16 | End: 2025-06-18 | Stop reason: HOSPADM

## 2025-06-16 RX ORDER — PANTOPRAZOLE SODIUM 40 MG/1
40 TABLET, DELAYED RELEASE ORAL
Status: DISCONTINUED | OUTPATIENT
Start: 2025-06-16 | End: 2025-06-18 | Stop reason: HOSPADM

## 2025-06-16 RX ORDER — BUPROPION HYDROCHLORIDE 100 MG/1
100 TABLET, EXTENDED RELEASE ORAL 2 TIMES DAILY
Status: DISCONTINUED | OUTPATIENT
Start: 2025-06-16 | End: 2025-06-17

## 2025-06-16 RX ORDER — POLYETHYLENE GLYCOL 3350 17 G/17G
17 POWDER, FOR SOLUTION ORAL DAILY
Status: DISCONTINUED | OUTPATIENT
Start: 2025-06-16 | End: 2025-06-18 | Stop reason: HOSPADM

## 2025-06-16 RX ADMIN — CARBIDOPA AND LEVODOPA 1 TABLET: 10; 100 TABLET ORAL at 03:06

## 2025-06-16 RX ADMIN — QUETIAPINE FUMARATE 300 MG: 100 TABLET ORAL at 09:06

## 2025-06-16 RX ADMIN — PANTOPRAZOLE SODIUM 40 MG: 40 TABLET, DELAYED RELEASE ORAL at 04:06

## 2025-06-16 RX ADMIN — ACETAMINOPHEN 650 MG: 325 TABLET ORAL at 01:06

## 2025-06-16 RX ADMIN — TAMSULOSIN HYDROCHLORIDE 0.4 MG: 0.4 CAPSULE ORAL at 08:06

## 2025-06-16 RX ADMIN — HYDROCODONE BITARTRATE AND ACETAMINOPHEN 1 TABLET: 10; 325 TABLET ORAL at 03:06

## 2025-06-16 RX ADMIN — APIXABAN 5 MG: 5 TABLET, FILM COATED ORAL at 09:06

## 2025-06-16 RX ADMIN — HYDROXYZINE HYDROCHLORIDE 50 MG: 25 TABLET, FILM COATED ORAL at 08:06

## 2025-06-16 RX ADMIN — POLYETHYLENE GLYCOL 3350 17 G: 17 POWDER, FOR SOLUTION ORAL at 04:06

## 2025-06-16 RX ADMIN — APIXABAN 5 MG: 5 TABLET, FILM COATED ORAL at 08:06

## 2025-06-16 RX ADMIN — CARBIDOPA AND LEVODOPA 1 TABLET: 10; 100 TABLET ORAL at 09:06

## 2025-06-16 RX ADMIN — SENNOSIDES 8.6 MG: 8.6 TABLET, FILM COATED ORAL at 04:06

## 2025-06-16 RX ADMIN — HYDROXYZINE HYDROCHLORIDE 50 MG: 25 TABLET, FILM COATED ORAL at 09:06

## 2025-06-16 RX ADMIN — HYDROCODONE BITARTRATE AND ACETAMINOPHEN 1 TABLET: 10; 325 TABLET ORAL at 09:06

## 2025-06-16 RX ADMIN — AMANTADINE HYDROCHLORIDE 200 MG: 100 CAPSULE, LIQUID FILLED ORAL at 08:06

## 2025-06-16 RX ADMIN — SUCRALFATE 1 G: 1 TABLET ORAL at 09:06

## 2025-06-16 RX ADMIN — BUPROPION HYDROCHLORIDE 100 MG: 100 TABLET, FILM COATED, EXTENDED RELEASE ORAL at 09:06

## 2025-06-16 RX ADMIN — SUCRALFATE 1 G: 1 TABLET ORAL at 08:06

## 2025-06-16 RX ADMIN — VENLAFAXINE HYDROCHLORIDE 150 MG: 150 CAPSULE, EXTENDED RELEASE ORAL at 08:06

## 2025-06-16 RX ADMIN — ESCITALOPRAM OXALATE 20 MG: 10 TABLET ORAL at 08:06

## 2025-06-16 RX ADMIN — CARBIDOPA AND LEVODOPA 1 TABLET: 10; 100 TABLET ORAL at 08:06

## 2025-06-16 RX ADMIN — HYDROCODONE BITARTRATE AND ACETAMINOPHEN 1 TABLET: 10; 325 TABLET ORAL at 08:06

## 2025-06-16 NOTE — PROGRESS NOTES
[facility]  Hospital Medicine Progress Note        Chief Complaint: Inpatient Follow-up for     HPI:   54-year-old male with a past medical history of bipolar disorder, spasticity, left DVT and PE (on Eliquis), cord compression/myelomalacia former longstanding methamphetamine use, tobacco dependence, marijuana use who presents after holding a gun to his head.  Patient was PEC'd in the emergency room.  Patient states he has been experiencing unintentional weight loss and several months of vomiting, all-over pain and headache that led to him wanting to end it all today. Denies HI     Pain management Dr. Sheehan in [place].  Psychiatry Dr. Garibay.   Neurology: Dr. Ivelisse Siddiqui     At baseline the patient lives with his wife and ambulates with a cane.    6/15: NSG has signed off as patient is being worked up outpatient by NS. Neurology consulted as patient claims to have Parkinson's however there is no medications on file; possibly this is Parkinson secondary to chronic meth abuse.  Nausea and vomiting may resolve if started on appropriate medications.  Additionally we will consult GI as patient has chronic N/V with alarming symptoms of severe weight loss.  Psych following for suicidal ideation.    Interval Hx:   Patient was seen and evaluate at bedside, oxygenating well on RA.  Tremors have improved after neurology has started carbidopa/levodopa.  Patient's nausea and vomiting has improved however he does mention that it is hard for him to swallow medications and solids.  Due to his history of requiring esophageal dilation, we will await for GI eval. Psych following.  PEC still in place.    Case was discussed with patient's nurse and  on the floor.    Objective/physical exam:  General: In no acute distress, afebrile  Chest: Clear to auscultation bilaterally  Heart: RRR, +S1, S2, no appreciable murmur  Abdomen: Soft, nontender, BS +  MSK: Warm, no lower extremity  edema, no clubbing or cyanosis  Neurologic: Alert and oriented x4, Cranial nerve II-XII intact, Strength 5/5 in all 4 extremities    VITAL SIGNS: 24 HRS MIN & MAX LAST   Temp  Min: 97.5 °F (36.4 °C)  Max: 97.8 °F (36.6 °C) 97.7 °F (36.5 °C)   BP  Min: 97/65  Max: 113/69 100/66   Pulse  Min: 53  Max: 67  65   Resp  Min: 18  Max: 18 18   SpO2  Min: 97 %  Max: 99 % 98 %     I have reviewed the following labs:  Recent Labs   Lab 06/14/25  1625 06/16/25  0644   WBC 11.02 6.26  6.26   RBC 6.21* 5.18   HGB 17.9 14.8   HCT 54.3* 45.6   MCV 87.4 88.0   MCH 28.8 28.6   MCHC 33.0 32.5*   RDW 17.2* 16.3    197   MPV 10.3 9.3     Recent Labs   Lab 06/14/25  1647 06/15/25  0627 06/16/25  0644    140 143   K 3.5 5.1 4.7    107 109*   CO2 23 24 25   BUN 12.0 12.4 10.5   CREATININE 0.93 0.88 0.78   * 106* 93   CALCIUM 9.7 9.5 9.2   MG 1.70  --  1.90   ALBUMIN 4.2 4.0  --    PROT 8.4* 7.8  --    ALKPHOS 159* 142  --    ALT 29 27  --    AST 16 16  --    BILITOT 1.4 1.5  --      Microbiology Results (last 7 days)       Procedure Component Value Units Date/Time    Stool Culture [[id number]]     Order Status: Sent Specimen: Stool     Clostridium Diff Toxin, A & B, EIA [[id number]]     Order Status: Sent Specimen: Stool              See below for Radiology    Assessment/Plan:  History of Parkinson's disease?  -as per patient  -on amantadine at home  -possibly due to chronic meth use patient is experiencing Parkinsonism  symptoms  -neurology following and has started carbidopa/levodopa.  -patient has noted significant improvement in tremors, nausea and vomiting    Protein calorie malnutrition   -secondary to persistent nausea/vomiting   -patient states he has lost 167 lb unintentionally ?   -dietary consulted  -GI consulted as patient has alarming symptoms; possibly nausea/vomiting and significant weight loss possibly contributed to his drug-induced Parkinsonism?  -endorses having a EGD performed in the past; was  dilated at the time    SI  History of bipolar disorder  -1:1, PEC'ed in the ED on 6/14  -Psychiatry consulted, eval pending  -continue with escitalopram, hydroxyzine, venlafaxine, quetiapine     left DVT and PE  -c/w Eliquis     Spasticity  Cord compression/myelomalacia  -followed by Dr. Venegas in [place]  -MRI from 3/7 of thoracic spine showed: Severe spinal canal stenosis at T10-T11 with associated cord compression and   intramedullary T2 hyperintense signal compatible with compressive myelopathy  -cervical spine MRI showed Moderate spinal canal stenosis at C2-C3   -neurosurgery consulted but he is also established in [place] with spine surgeon, Dr. Jin Demarco  -[facility] has signed off  -PT OT eval pending     Elevated ALP   -GGT within normal limits   -right upper quadrant ultrasound with cholelithiasis, no evidence of cholecystitis     Headache   -Fioricet trial   -if no improvement may need further evaluation with imaging     Tobacco dependence  Marijuana use   -states he quit May 8th, previously half pack per day     History of former longstanding methamphetamine use       VTE prophylaxis: lovenox  Patient condition:  Stable  Anticipated discharge and Disposition:   placement?      All diagnosis and differential diagnosis have been reviewed; assessment and plan has been documented; I have personally reviewed the labs and test results that are presently available; I have reviewed the patients medication list; I have reviewed the consulting providers response and recommendations. I have reviewed or attempted to review medical records based upon their availability    All of the patient's questions have been  addressed and answered. Patient's is agreeable to the above stated plan. I will continue to monitor closely and make adjustments to medical management as needed.    Portions of this note dictated using EMR integrated voice recognition software, and may be subject to voice recognition errors not corrected at  proofreading. Please contact writer for clarification if needed.   _____________________________________________________________________    Malnutrition Status:  Nutrition consulted. Most recent weight and BMI monitored-     Measurements:  Wt Readings from Last 1 Encounters:   06/14/25 79.4 kg (175 lb)   Body mass index is 22.47 kg/m².    Patient has been screened and assessed by RD.    Malnutrition Type:  Context:    Level:      Malnutrition Characteristic Summary:       Interventions/Recommendations (treatment strategy):        Scheduled Med:   amantadine HCL  200 mg Oral Daily    apixaban  5 mg Oral BID    carbidopa-levodopa  mg  1 tablet Oral TID    EScitalopram oxalate  20 mg Oral Daily    hydrOXYzine  50 mg Oral BID    magnesium sulfate 2 g IVPB  2 g Intravenous Once    QUEtiapine  300 mg Oral QHS    sucralfate  1 g Oral BID    tamsulosin  1 capsule Oral Daily    venlafaxine  150 mg Oral Daily      Continuous Infusions:     PRN Meds:    Current Facility-Administered Medications:     acetaminophen, 650 mg, Oral, Q4H PRN    albuterol-ipratropium, 3 mL, Nebulization, Q6H PRN    aluminum-magnesium hydroxide-simethicone, 30 mL, Oral, QID PRN    bisacodyL, 10 mg, Rectal, Daily PRN    butalbital-acetaminophen-caffeine -40 mg, 1 tablet, Oral, Q4H PRN    cyclobenzaprine, 10 mg, Oral, TID PRN    dextrose 50%, 12.5 g, Intravenous, PRN    dextrose 50%, 25 g, Intravenous, PRN    glucagon (human recombinant), 1 mg, Intramuscular, PRN    glucose, 16 g, Oral, PRN    glucose, 24 g, Oral, PRN    HYDROcodone-acetaminophen, 1 tablet, Oral, Q6H PRN    melatonin, 9 mg, Oral, Nightly PRN    naloxone, 0.02 mg, Intravenous, PRN    ondansetron, 8 mg, Oral, Q8H PRN    ondansetron, 4 mg, Intravenous, Q8H PRN    polyethylene glycol, 17 g, Oral, TID PRN    simethicone, 1 tablet, Oral, QID PRN    sodium chloride 0.9%, 10 mL, Intravenous, PRN    sumatriptan, 50 mg, Oral, Daily PRN     Radiology:  I have personally reviewed the  following imaging and agree with the radiologist.     US Abdomen Limited  Narrative: EXAMINATION:  US ABDOMEN LIMITED    CLINICAL HISTORY:  Elevated liver function test    TECHNIQUE:  Multiple real-time transverse and longitudinal sections were performed of the right abdomen by the sonographer. Select images were submitted for review.    COMPARISON:  CT abdomen pelvis July 31, 2023.    FINDINGS:  Liver is of unremarkable echotexture with normal contour and size. There was no delineation of discrete hepatic cystic or solid mass. Hepatic maximum diameter of 15.3 cm. There was unremarkable hepatopedal flow within the portal vein.  Pancreas obscured by overlying bowel gas.    Gallbladder lumen contains several echogenic foci consistent with cholelithiasis.  Largest calculus measures 2.0 x 1.2 cm.  Gallbladder wall thickness is within normal limits. Common bile duct caliber of 3 point mm is within normal limits for the age. Sonographer reported negative Lake's sign.    Inferior vena cava and abdominal aorta are not visualized due to overlying bowel gas    Normally located right kidney length measures 9.8 x 5.8 x 5.0 cm. Right renal corticomedullary differentiation is unremarkable. No evidence of hydronephrosis.  Impression: 1.  Multiple gallstones without sonographic findings of acute cholecystitis.    2.  Details of other findings above.    Electronically signed by: Omari Mcclain  Date:    06/14/2025  Time:    21:18  X-Ray Chest 1 View  Narrative: EXAMINATION:  XR CHEST 1 VIEW    CLINICAL HISTORY:  Chest pain, unspecified    TECHNIQUE:  One view    COMPARISON:  None available.    FINDINGS:  Cardiopericardial silhouette is within normal limits.  No acute dense focal or segmental consolidation, congestive process, pleural effusions or pneumothorax.  Partially visualized cervicothoracic fusions.  Impression: No acute cardiopulmonary process identified.    Electronically signed by: Omari  Sarahi  Date:    06/14/2025  Time:    17:04      Valeria Ponce MD  Department of Hospital Medicine   [facility]   06/16/2025

## 2025-06-16 NOTE — CONSULTS
Inpatient Nutrition Assessment    Admit Date: 6/14/2025   Total duration of encounter: 2 days   Patient Age: 54 y.o.    Nutrition Recommendation/Prescription     Continue current diet (Diet Adult Regular Safety Tray) as tolerated.   Add Boost Very High Calorie (provides 530 kcal, 22 g protein per serving) TID    Communication of Recommendations: reviewed with nurse and reviewed with patient    Nutrition Assessment     Malnutrition Assessment/Nutrition-Focused Physical Exam    Malnutrition Context: chronic illness (06/16/25 1450)  Malnutrition Level: severe (06/16/25 1450)  Energy Intake (Malnutrition): less than or equal to 75% for greater than or equal to 1 month (06/16/25 1450)  Weight Loss (Malnutrition): greater than 20% in 1 year (06/16/25 1450)  Subcutaneous Fat (Malnutrition): mild depletion (06/16/25 1450)  Orbital Region (Subcutaneous Fat Loss): mild depletion        Muscle Mass (Malnutrition): moderate depletion (06/16/25 1450)  [place] Region (Muscle Loss): moderate depletion  Clavicle Bone Region (Muscle Loss): moderate depletion                    Fluid Accumulation (Malnutrition): other (see comments) (Not present) (06/16/25 1450)     Hand  Strength, Right (Malnutrition): Unable to assess (06/16/25 1450)  A minimum of two characteristics is recommended for diagnosis of either severe or non-severe malnutrition.    Chart Review    Reason Seen: malnutrition screening tool (MST) and physician consult for protein celeste malnutrition, unintentional weight loss    Malnutrition Screening Tool Results   Have you recently lost weight without trying?: Yes: Unsure how much  Have you been eating poorly because of a decreased appetite?: Yes   MST Score: 3   Diagnosis:  Parkinson's Disease  Protein calorie malnutrition  SI  Left DVT and PE  Spasticity  Cord compression/myelomalacia     Relevant Medical History:   f bipolar disorder, spasticity, left DVT and PE (on Eliquis), cord compression/myelomalacia former  longstanding methamphetamine use, tobacco dependence, marijuana use     Scheduled Medications:  amantadine HCL, 200 mg, Daily  apixaban, 5 mg, BID  buPROPion, 100 mg, BID  carbidopa-levodopa  mg, 1 tablet, TID  EScitalopram oxalate, 20 mg, Daily  hydrOXYzine, 50 mg, BID  magnesium sulfate 2 g IVPB, 2 g, Once  QUEtiapine, 300 mg, QHS  sucralfate, 1 g, BID  tamsulosin, 1 capsule, Daily  venlafaxine, 150 mg, Daily    Continuous Infusions:   PRN Medications:  acetaminophen, 650 mg, Q4H PRN  albuterol-ipratropium, 3 mL, Q6H PRN  aluminum-magnesium hydroxide-simethicone, 30 mL, QID PRN  bisacodyL, 10 mg, Daily PRN  butalbital-acetaminophen-caffeine -40 mg, 1 tablet, Q4H PRN  cyclobenzaprine, 10 mg, TID PRN  dextrose 50%, 12.5 g, PRN  dextrose 50%, 25 g, PRN  glucagon (human recombinant), 1 mg, PRN  glucose, 16 g, PRN  glucose, 24 g, PRN  HYDROcodone-acetaminophen, 1 tablet, Q6H PRN  melatonin, 9 mg, Nightly PRN  naloxone, 0.02 mg, PRN  ondansetron, 8 mg, Q8H PRN  ondansetron, 4 mg, Q8H PRN  polyethylene glycol, 17 g, TID PRN  simethicone, 1 tablet, QID PRN  sodium chloride 0.9%, 10 mL, PRN  sumatriptan, 50 mg, Daily PRN    Calorie Containing IV Medications: no significant kcals from medications at this time    Recent Labs   Lab 06/14/25  1625 06/14/25  1647 06/15/25  0627 06/16/25  0644   NA  --  138 140 143   K  --  3.5 5.1 4.7   CALCIUM  --  9.7 9.5 9.2   PHOS  --   --   --  3.6   MG  --  1.70  --  1.90   CL  --  104 107 109*   CO2  --  23 24 25   BUN  --  12.0 12.4 10.5   CREATININE  --  0.93 0.88 0.78   EGFRNORACEVR  --  >60 >60 >60   GLU  --  121* 106* 93   BILITOT  --  1.4 1.5  --    ALKPHOS  --  159* 142  --    ALT  --  29 27  --    AST  --  16 16  --    ALBUMIN  --  4.2 4.0  --    CRP  --  3.70  --   --    WBC 11.02  --   --  6.26  6.26   HGB 17.9  --   --  14.8   HCT 54.3*  --   --  45.6     Nutrition Orders:  Diet Adult Regular Safety Tray      Appetite/Oral Intake: fair/50-75% of meals  Factors  "Affecting Nutritional Intake: decreased appetite  Social Needs Impacting Access to Food: none identified  Food/Restorationist/Cultural Preferences: none reported  Food Allergies: no known food allergies  Last Bowel Movement: 06/14/25  Wound(s):  skin intact per EMR    Comments    6/16/25: Pt reports fair appetite today, states his swallowing has improved and that he was able to eat half of a hamburger for lunch. Pt reports nausea and vomiting for the past 3 months but states that PO intake has been poor for about the past year due to tightness in his jaw. Pt reports UBW between 260-280 lbs, last weighing that about a year ago. CBW of 175 lbs indicates a 33% wt loss x1 year, nutritionally significant. Some mild-moderate physical signs of malnutrition observed. Pt agreeable to receiving Boost supplements with meals and encouraged pt to continue ONS once discharged.     Anthropometrics    Height: 6' 2" (188 cm), Height Method: Stated  Last Weight: 79.4 kg (175 lb) (06/14/25 2230), Weight Method: Standard Scale  BMI (Calculated): 22.5  BMI Classification: normal (BMI 18.5-24.9)        Ideal Body Weight (IBW), Male: 190 lb     % Ideal Body Weight, Male (lb): 92.11 %                          Usual Weight Provided By: patient    Wt Readings from Last 5 Encounters:   06/14/25 79.4 kg (175 lb)   08/05/24 108 kg (238 lb 3.2 oz)   11/15/23 126.8 kg (279 lb 9.6 oz)   10/30/23 125.2 kg (276 lb)   10/27/23 125.2 kg (276 lb)     Weight Change(s) Since Admission:   Wt Readings from Last 1 Encounters:   06/14/25 2230 79.4 kg (175 lb)   06/14/25 1558 79.4 kg (175 lb)   Admit Weight: 79.4 kg (175 lb) (06/14/25 1558), Weight Method: Stated    Estimated Needs    Weight Used For Calorie Calculations: 79.4 kg (175 lb 0.7 oz)  Energy Calorie Requirements (kcal): 2220 kcals (1.3 SFxMSJ)  Energy Need Method: Gregory-St Jeor  Weight Used For Protein Calculations: 79.4 kg (175 lb 0.7 oz)  Protein Requirements:  (1.1-1.3 g/kg)  Fluid " Requirements (mL): 2220 mL (1 mL/kcal)  CHO Requirement: N/A     Enteral Nutrition     Patient not receiving enteral nutrition at this time.    Parenteral Nutrition     Patient not receiving parenteral nutrition support at this time.    Evaluation of Received Nutrient Intake    Calories: not meeting estimated needs  Protein: not meeting estimated needs    Patient Education     Not applicable.    Nutrition Diagnosis     PES: Unintended weight loss related to inability to consume sufficient nutrients as evidenced by meeting <75% energy needs (new)     PES: Severe chronic disease or condition related malnutrition Related to inability to consume sufficient nutrients As Evidenced by:  - weight loss: > 20% in 1 year - energy intake: <= 75% for 3 months (meets criteria for <= 75% >= 1 month (severe - chronic)) - muscle mass depletion: 2 areas of moderate muscle loss (Clavicle, Temporalis) - loss of subcutaneous fat: 2 areas of mild fat loss (Buccal, Infraorbital) new    Nutrition Interventions     Intervention(s): general/healthful diet, commercial beverage, and collaboration with other providers  Intervention(s): Oral nutritional supplement    Goal: Meet greater than 80% of nutritional needs by follow-up. (new)  Goal: Maintain weight throughout hospitalization. (new)    Nutrition Goals & Monitoring     Dietitian will monitor: energy intake, weight, and gastrointestinal profile  Discharge planning: continue regular diet with Boost or equivalent oral supplements  Nutrition Risk/Follow-Up: patient at increased nutrition risk; dietitian will follow-up twice weekly   Please consult if re-assessment needed sooner.

## 2025-06-16 NOTE — PLAN OF CARE
06/16/25 1056   Discharge Assessment   Assessment Type Discharge Planning Assessment   Confirmed/corrected address, phone number and insurance Yes   Confirmed Demographics Correct on Facesheet   Source of Information patient   Communicated MAXIMO with patient/caregiver Date not available/Unable to determine   People in Home spouse   Name(s) of People in Home Vikki Thurston (Spouse)  [phone] (Mobile)   Facility Arrived From: Private residence: [place], [place].   Do you expect to return to your current living situation? Yes   Do you have help at home or someone to help you manage your care at home? Yes   Who are your caregiver(s) and their phone number(s)? Wife: Vikki Thurston (Spouse) [phone] (Mobile)   Prior to hospitilization cognitive status: Alert/Oriented   Current cognitive status: Alert/Oriented   Walking or Climbing Stairs Difficulty no   Dressing/Bathing Difficulty no   Home Accessibility   (There are (6) steps toclimb prior to entering his private residence.)   Home Layout Able to live on 1st floor   Equipment Currently Used at Home none   Readmission within 30 days? No   Patient currently being followed by outpatient case management? No   Do you currently have service(s) that help you manage your care at home? No   Do you take prescription medications? Yes   Do you have prescription coverage? Yes  (Financial Class: Medicare  Payor: MEDICARE - MEDICARE PART A & B -)   Do you have any problems affording any of your prescribed medications? No   Is the patient taking medications as prescribed? yes   Who is going to help you get home at discharge? Wife: Vikki Thurston (Spouse) [phone] (Mobile)   How do you get to doctors appointments? family or friend will provide   Are you on dialysis? No   Do you take coumadin? No   Discharge Plan A Psychiatric hospital  (The patient is on a (PEC: 6/14/25 @ 1641).)   Discharge Plan B Psychiatric hospital   DME Needed Upon Discharge  none   Discharge Plan  discussed with: Patient   Transition of Care Barriers None   Financial Resource Strain   How hard is it for you to pay for the very basics like food, housing, medical care, and heating? Not very   Housing Stability   In the last 12 months, was there a time when you were not able to pay the mortgage or rent on time? N   At any time in the past 12 months, were you homeless or living in a shelter (including now)? N   Transportation Needs   In the past 12 months, has lack of transportation kept you from medical appointments or from getting medications? no   In the past 12 months, has lack of transportation kept you from meetings, work, or from getting things needed for daily living? No   Food Insecurity   Within the past 12 months, you worried that your food would run out before you got the money to buy more. Never true   Within the past 12 months, the food you bought just didn't last and you didn't have money to get more. Never true   Stress   Do you feel stress - tense, restless, nervous, or anxious, or unable to sleep at night because your mind is troubled all the time - these days? Only a littl   Social Isolation   How often do you feel lonely or isolated from those around you?  Rarely   Alcohol Use   Q1: How often do you have a drink containing alcohol? Never   Q2: How many drinks containing alcohol do you have on a typical day when you are drinking? None   Q3: How often do you have six or more drinks on one occasion? Never   Utilities   In the past 12 months has the electric, gas, oil, or water company threatened to shut off services in your home? No   Health Literacy   How often do you need to have someone help you when you read instructions, pamphlets, or other written material from your doctor or pharmacy? Never     Wife: Vikki Thurston: [phone]  PCP: Elvi Crowley ([place], [place]).  Pharmacy: [facility] ([place], [place]).  DME: None.  Alcohol: None.  Tobacco: None.  Reports he uses Rx (Marijuana from  [facility]: 4-5 x's/day).  Denies past in-patient psych treatment.

## 2025-06-16 NOTE — ASSESSMENT & PLAN NOTE
Likely 2/2 parkinson's disease  Improved with sinemet    -continue current dose sinemet following discharge  -will f/u with neurology o/p  -Further recommendations per MD

## 2025-06-16 NOTE — PROGRESS NOTES
"6/16/2025  Aram Thurston   1970   [id number]        Psychiatry Progress Note       SUBJECTIVE:   Aram Thurston is a 54 y.o. male with a past medical history of bipolar disorder, spasticity, left DVT and PE (on Eliquis), cord compression/myelomalacia former longstanding methamphetamine use, tobacco dependence, marijuana use who presents after holding a gun to his head. Patient was PEC'd in the emergency room. Patient states he has been experiencing unintentional weight loss and several months of vomiting, all-over pain and headache that led to him wanting to end it all today.     Seen at the bedside where he reports "feeling better". Reports euthymic mood but displays irritable affect at times. Denies suicidal ideations and states he had suicidal gesture due to not being able to take his medications and struggling with nausea and vomiting. Reports improved sleep last night with increase of seroquel. Bupropion started today and patient unable to report why though his nurse reports patient stated he was taking this for restless legs. Recently prescribed medications appear to be lexapro 20mg PO QD and seroquel 200mg PO QHS.       Current Medications:   Scheduled Meds:    amantadine HCL  200 mg Oral Daily    apixaban  5 mg Oral BID    buPROPion  100 mg Oral BID    carbidopa-levodopa  mg  1 tablet Oral TID    EScitalopram oxalate  20 mg Oral Daily    hydrOXYzine  50 mg Oral BID    magnesium sulfate 2 g IVPB  2 g Intravenous Once    QUEtiapine  300 mg Oral QHS    sucralfate  1 g Oral BID    tamsulosin  1 capsule Oral Daily    venlafaxine  150 mg Oral Daily      PRN Meds:   Current Facility-Administered Medications:     acetaminophen, 650 mg, Oral, Q4H PRN    albuterol-ipratropium, 3 mL, Nebulization, Q6H PRN    aluminum-magnesium hydroxide-simethicone, 30 mL, Oral, QID PRN    bisacodyL, 10 mg, Rectal, Daily PRN    butalbital-acetaminophen-caffeine -40 mg, 1 tablet, Oral, Q4H PRN    " cyclobenzaprine, 10 mg, Oral, TID PRN    dextrose 50%, 12.5 g, Intravenous, PRN    dextrose 50%, 25 g, Intravenous, PRN    glucagon (human recombinant), 1 mg, Intramuscular, PRN    glucose, 16 g, Oral, PRN    glucose, 24 g, Oral, PRN    HYDROcodone-acetaminophen, 1 tablet, Oral, Q6H PRN    melatonin, 9 mg, Oral, Nightly PRN    naloxone, 0.02 mg, Intravenous, PRN    ondansetron, 8 mg, Oral, Q8H PRN    ondansetron, 4 mg, Intravenous, Q8H PRN    polyethylene glycol, 17 g, Oral, TID PRN    simethicone, 1 tablet, Oral, QID PRN    sodium chloride 0.9%, 10 mL, Intravenous, PRN    sumatriptan, 50 mg, Oral, Daily PRN   Psychotherapeutics (From admission, onward)      Start     Stop Route Frequency Ordered    06/16/25 2100  QUEtiapine tablet 300 mg         -- Oral Nightly 06/15/25 1305    06/16/25 2100  buPROPion TBSR 12 hr tablet 100 mg         -- Oral 2 times daily 06/16/25 1334    06/15/25 0900  EScitalopram oxalate tablet 20 mg         -- Oral Daily 06/14/25 2004    06/15/25 0900  venlafaxine 24 hr capsule 150 mg         -- Oral Daily 06/14/25 2004            Allergies:   Review of patient's allergies indicates:   Allergen Reactions    Buprenorphine-naloxone     Tylox [oxycodone-acetaminophen]     Iodinated contrast media Nausea Only    Nitrous oxide Nausea And Vomiting        OBJECTIVE:   Vitals   Vitals:    06/16/25 1500   BP:    Pulse:    Resp: 18   Temp:         Labs/Imaging/Studies:   Recent Results (from the past 36 hours)   Comprehensive Metabolic Panel    Collection Time: 06/15/25  6:27 AM   Result Value Ref Range    Sodium 140 136 - 145 mmol/L    Potassium 5.1 3.5 - 5.1 mmol/L    Chloride 107 98 - 107 mmol/L    CO2 24 22 - 29 mmol/L    Glucose 106 (H) 74 - 100 mg/dL    Blood Urea Nitrogen 12.4 8.4 - 25.7 mg/dL    Creatinine 0.88 0.72 - 1.25 mg/dL    Calcium 9.5 8.4 - 10.2 mg/dL    Protein Total 7.8 6.4 - 8.3 gm/dL    Albumin 4.0 3.5 - 5.0 g/dL    Globulin 3.8 (H) 2.4 - 3.5 gm/dL    Albumin/Globulin Ratio 1.1 1.1  - 2.0 ratio    Bilirubin Total 1.5 <=1.5 mg/dL     40 - 150 unit/L    ALT 27 0 - 55 unit/L    AST 16 11 - 45 unit/L    eGFR >60 mL/min/1.73/m2    Anion Gap 9.0 mEq/L    BUN/Creatinine Ratio 14    Drug Screen, Urine    Collection Time: 06/15/25  2:33 PM   Result Value Ref Range    Amphetamines, Urine Negative Negative    Barbiturates, Urine Negative Negative    Benzodiazepine, Urine Negative Negative    Cannabinoids, Urine Positive (A) Negative    Cocaine, Urine Negative Negative    Fentanyl, Urine Negative Negative    MDMA, Urine Negative Negative    Opiates, Urine Positive (A) Negative    Phencyclidine, Urine Negative Negative    pH, Urine 6.0 3.0 - 11.0    Specific Gravity, Urine Auto >1.030 1.001 - 1.035   Basic Metabolic Panel    Collection Time: 06/16/25  6:44 AM   Result Value Ref Range    Sodium 143 136 - 145 mmol/L    Potassium 4.7 3.5 - 5.1 mmol/L    Chloride 109 (H) 98 - 107 mmol/L    CO2 25 22 - 29 mmol/L    Glucose 93 74 - 100 mg/dL    Blood Urea Nitrogen 10.5 8.4 - 25.7 mg/dL    Creatinine 0.78 0.72 - 1.25 mg/dL    BUN/Creatinine Ratio 13     Calcium 9.2 8.4 - 10.2 mg/dL    Anion Gap 9.0 mEq/L    eGFR >60 mL/min/1.73/m2   Magnesium    Collection Time: 06/16/25  6:44 AM   Result Value Ref Range    Magnesium Level 1.90 1.60 - 2.60 mg/dL   Phosphorus    Collection Time: 06/16/25  6:44 AM   Result Value Ref Range    Phosphorus Level 3.6 2.3 - 4.7 mg/dL   CBC with Differential    Collection Time: 06/16/25  6:44 AM   Result Value Ref Range    WBC 6.26 4.50 - 11.50 x10(3)/mcL    RBC 5.18 4.70 - 6.10 x10(6)/mcL    Hgb 14.8 14.0 - 18.0 g/dL    Hct 45.6 42.0 - 52.0 %    MCV 88.0 80.0 - 94.0 fL    MCH 28.6 27.0 - 31.0 pg    MCHC 32.5 (L) 33.0 - 36.0 g/dL    RDW 16.3 11.5 - 17.0 %    Platelet 197 130 - 400 x10(3)/mcL    MPV 9.3 7.4 - 10.4 fL   Manual Differential    Collection Time: 06/16/25  6:44 AM   Result Value Ref Range    WBC 6.26 x10(3)/mcL    Neutrophils % 59 %    Lymphs % 33 %    Monocytes % 4 %     Eosinophils % 3 %    Basophils % 1 %    Neutrophils Abs 3.6934 2.1 - 9.2 x10(3)/mcL    Lymphs Abs 2.0658 0.6 - 4.6 x10(3)/mcL    Monocytes Abs 0.2504 0.1 - 1.3 x10(3)/mcL    Eosinophils Abs 0.1878 0 - 0.9 x10(3)/mcL    Basophils Abs 0.0626 0 - 0.2 x10(3)/mcL    Platelets Normal Normal, Adequate    RBC Morph Normal Normal        Psychiatric Mental Status Exam:  General Appearance: appears stated age, dressed in hospital garb, lying in bed, in no acute distress  Arousal: alert  Behavior: cooperative, appropriate eye-contact, under good behavioral control  Movements and Motor Activity: +parkinsonism  Orientation: oriented to person, place, and time  Speech: normal rate, rhythm, volume, tone and pitch  Mood: Euthymic  Affect: reactive, full-range, irritable  Thought Process: linear, goal-directed  Associations: no loosening of associations  Thought Content and Perceptions: no suicidal or homicidal ideation, no auditory or visual hallucinations, no paranoid ideation, no ideas of reference, no evidence of delusions or psychosis  Recent and Remote Memory: grossly intact; per interview/observation with patient  Attention and Concentration: grossly intact; per interview/observation with patient  Fund of Knowledge: vocabulary appropriate; based on history, vocabulary, fund of knowledge, syntax, grammar, and content  Insight: questionable; based on understanding of severity of illness and HPI  Judgment: questionable; based on patient's behavior and HPI    ASSESSMENT/PLAN:   Problems Addressed/Diagnoses:    Major Depressive Disorder, Recurrent, Severe without psychosis (F33.2)     Past Medical History:   Diagnosis Date    Anxiety     Arthritis     Asthma     Back pain     Balance problems     Cervical herniated disc     Depression     Diabetes mellitus     Dry cough     DVT (deep venous thrombosis)     Enlarged prostate     Gout     Hemoptysis     HLD (hyperlipidemia)     Insomnia     Leg weakness     Lung mass     Migraines      Neck pain     Neuropathy     Obesity     PONV (postoperative nausea and vomiting)     Pulmonary embolism     Spinal stenosis     TIA (transient ischemic attack)     Use of cane as ambulatory aid     Walker as ambulation aid         Plan:  Medication Management  Continue lexapro 20mg PO QD  Continue seroquel 300mg PO QHS  Discontinue venlafaxine (appears to be old home medication and currently on an SSRI)  Discontinue bupropion (appears to be old home medication)  Legal  Continue PEC  Disposition  Recommend inpatient psychiatric hospitalization  Psychiatry will continue to follow        Black Campo

## 2025-06-16 NOTE — SUBJECTIVE & OBJECTIVE
Subjective:     Interval History:   Tremors significantly improved this a.m., as well as rigidity.  Tolerating Sinemet.    Current Neurological Medications:     Current Medications[1]    Review of Systems  A 14pt ros was reviewed & is negative unless o/w documented in the hpi    Objective:     Vital Signs (Most Recent):  Temp: 97.7 °F (36.5 °C) (06/16/25 1311)  Pulse: 65 (06/16/25 1037)  Resp: 18 (06/16/25 1037)  BP: 100/66 (06/16/25 1037)  SpO2: 98 % (06/16/25 1037) Vital Signs (24h Range):  Temp:  [97.5 °F (36.4 °C)-97.8 °F (36.6 °C)] 97.7 °F (36.5 °C)  Pulse:  [53-67] 65  Resp:  [18] 18  SpO2:  [97 %-99 %] 98 %  BP: ()/(65-71) 100/66     Weight: 79.4 kg (175 lb)  Body mass index is 22.47 kg/m².     Physical Exam      GENERAL: NAD, calm, cooperative, appropriate, awake/alert  MENTAL STATUS: Oriented x4, follows commands reliably  SPEECH/LANGUAGE: Clear, coherent  CN:  EOMI gaze conjugate, visual fields intact  PERRLA  Motor: no focal motor weakness  Cerebellar: intermittent b/l hand, pill-rolling tremor noted, mild  Cogwheel rigidity noted, overall increased tone  Sensory: Normal to tactile stim/vibration  Gait: shuffled gait w/ multi-point turn      Significant Labs:   Recent Lab Results         06/16/25  0644   06/15/25  1433        Phencyclidine   Negative       Amphetamines, Urine   Negative       Anion Gap 9.0         Barbituates, Urine   Negative       Baso # 0.0626         Basophil % 1         Benzodiazepine, Urine   Negative       BUN 10.5         BUN/CREAT RATIO 13         Calcium 9.2         Cannabinoids, Urine   Positive       Chloride 109         CO2 25         Cocaine, Urine   Negative       Creatinine 0.78         eGFR >60  Comment: Estimated GFR calculated using the CKD-EPI creatinine (2021) equation.         Eos # 0.1878         Eos % 3         Fentanyl, Urine   Negative       Glucose 93         Gran # (ANC) 3.6934         Hematocrit 45.6         Hemoglobin 14.8         Lymph # 2.0658          Lymphocyte % 33         Magnesium  1.90         MCH 28.6         MCHC 32.5         MCV 88.0         MDMA, Urine   Negative       Mono # 0.2504         Mono % 4         MPV 9.3         Neutrophils Relative 59         Opiates, Urine   Positive       pH, Urine   6.0       Phosphorus Level 3.6         Platelet Estimate Normal         Platelet Count 197         Potassium 4.7         RBC 5.18         RBC Morph Normal         RDW 16.3         Sodium 143         Specific Gravity, Urine Auto   >1.030       WBC 6.26          6.26                 Significant Imaging: I have reviewed all pertinent imaging results/findings within the past 24 hours.       [1]   Current Facility-Administered Medications   Medication Dose Route Frequency Provider Last Rate Last Admin    acetaminophen tablet 650 mg  650 mg Oral Q4H PRN Reyes, Thairy G, DO   650 mg at 06/16/25 1311    albuterol-ipratropium 2.5 mg-0.5 mg/3 mL nebulizer solution 3 mL  3 mL Nebulization Q6H PRN Reyes, Thairy G, DO        aluminum-magnesium hydroxide-simethicone 200-200-20 mg/5 mL suspension 30 mL  30 mL Oral QID PRN Reyes, Thairy G, DO        amantadine HCL capsule/tablet 200 mg  200 mg Oral Daily Norman Bueno MD   200 mg at 06/16/25 0815    apixaban tablet 5 mg  5 mg Oral BID Reyes, Thairy G, DO   5 mg at 06/16/25 0816    bisacodyL suppository 10 mg  10 mg Rectal Daily PRN Reyes, Thairy G, DO        buPROPion TBSR 12 hr tablet 100 mg  100 mg Oral BID Valeria Ponce MD        butalbital-acetaminophen-caffeine -40 mg per tablet 1 tablet  1 tablet Oral Q4H PRN Reyes, Thairy G, DO        carbidopa-levodopa  mg per tablet 1 tablet  1 tablet Oral TID Patrick Pereyra MD   1 tablet at 06/16/25 0815    cyclobenzaprine tablet 10 mg  10 mg Oral TID PRN Reyes, Thairy G, DO   10 mg at 06/15/25 1705    dextrose 50% injection 12.5 g  12.5 g Intravenous PRN Reyes, Thairy G, DO        dextrose 50% injection 25 g  25 g Intravenous PRN Reyes, Thairy G,          EScitalopram oxalate tablet 20 mg  20 mg Oral Daily Reyes, Thairy G, DO   20 mg at 06/16/25 0816    glucagon (human recombinant) injection 1 mg  1 mg Intramuscular PRN Reyes, Thairy G, DO        glucose chewable tablet 16 g  16 g Oral PRN Reyes, Thaiadan SAKINA, DO        glucose chewable tablet 24 g  24 g Oral PRN Reyes, Thairy G, DO        HYDROcodone-acetaminophen  mg per tablet 1 tablet  1 tablet Oral Q6H PRN Reyes, Thairy G, DO   1 tablet at 06/16/25 0815    hydrOXYzine HCL tablet 50 mg  50 mg Oral BID Reyes, Thairy G, DO   50 mg at 06/16/25 0815    magnesium sulfate 2g in water 50mL IVPB (premix)  2 g Intravenous Once Reyes, Thairy G, DO        melatonin tablet 9 mg  9 mg Oral Nightly PRN Reyes, Thairy G, DO        naloxone 0.4 mg/mL injection 0.02 mg  0.02 mg Intravenous PRN Reyes, Thairy G, DO        ondansetron disintegrating tablet 8 mg  8 mg Oral Q8H PRN Reyes, Thairy G, DO        ondansetron injection 4 mg  4 mg Intravenous Q8H PRN Reyes, Thairy G, DO   4 mg at 06/15/25 0324    polyethylene glycol packet 17 g  17 g Oral TID PRN Reyes, Thairy G, DO        QUEtiapine tablet 300 mg  300 mg Oral QHS Doug Berkowitz, PMHNP        simethicone chewable tablet 80 mg  1 tablet Oral QID PRN Reyes, Thairy G, DO        sodium chloride 0.9% flush 10 mL  10 mL Intravenous PRN Reyes, Thairy G, DO        sucralfate tablet 1 g  1 g Oral BID Reyes, Thairy G, DO   1 g at 06/16/25 0815    sumatriptan tablet 50 mg  50 mg Oral Daily PRN Reyes, Thairy G, DO        tamsulosin 24 hr capsule 0.4 mg  1 capsule Oral Daily Reyes, Thairy G, DO   0.4 mg at 06/16/25 0816    venlafaxine 24 hr capsule 150 mg  150 mg Oral Daily Reyes, Thairy G, DO   150 mg at 06/16/25 0815

## 2025-06-16 NOTE — PROGRESS NOTES
[facility] - [facility] Tele[facility]  Neurology  Progress Note    Patient Name: Aram Thurston  MRN: [id number]  Admission Date: 6/14/2025  Hospital Length of Stay: 2 days  Code Status: Full Code   Attending Provider: Valeria Ponce MD  Primary Care Physician: Elvi Burnett MD   Principal Problem:Suicidal ideation    HPI:   No notes on file    Overview/Hospital Course:  No notes on file        Subjective:     Interval History:   Tremors significantly improved this a.m., as well as rigidity.  Tolerating Sinemet.    Current Neurological Medications:     Current Medications[1]    Review of Systems  A 14pt ros was reviewed & is negative unless o/w documented in the hpi    Objective:     Vital Signs (Most Recent):  Temp: 97.7 °F (36.5 °C) (06/16/25 1311)  Pulse: 65 (06/16/25 1037)  Resp: 18 (06/16/25 1037)  BP: 100/66 (06/16/25 1037)  SpO2: 98 % (06/16/25 1037) Vital Signs (24h Range):  Temp:  [97.5 °F (36.4 °C)-97.8 °F (36.6 °C)] 97.7 °F (36.5 °C)  Pulse:  [53-67] 65  Resp:  [18] 18  SpO2:  [97 %-99 %] 98 %  BP: ()/(65-71) 100/66     Weight: 79.4 kg (175 lb)  Body mass index is 22.47 kg/m².     Physical Exam      GENERAL: NAD, calm, cooperative, appropriate, awake/alert  MENTAL STATUS: Oriented x4, follows commands reliably  SPEECH/LANGUAGE: Clear, coherent  CN:  EOMI gaze conjugate, visual fields intact  PERRLA  Motor: no focal motor weakness  Cerebellar: intermittent b/l hand, pill-rolling tremor noted, mild  Cogwheel rigidity noted, overall increased tone  Sensory: Normal to tactile stim/vibration  Gait: shuffled gait w/ multi-point turn      Significant Labs:   Recent Lab Results         06/16/25  0644   06/15/25  1433        Phencyclidine   Negative       Amphetamines, Urine   Negative       Anion Gap 9.0         Barbituates, Urine   Negative       Baso # 0.0626         Basophil % 1         Benzodiazepine, Urine   Negative       BUN 10.5         BUN/CREAT RATIO 13         Calcium 9.2          Cannabinoids, Urine   Positive       Chloride 109         CO2 25         Cocaine, Urine   Negative       Creatinine 0.78         eGFR >60  Comment: Estimated GFR calculated using the CKD-EPI creatinine (2021) equation.         Eos # 0.1878         Eos % 3         Fentanyl, Urine   Negative       Glucose 93         Gran # (ANC) 3.6934         Hematocrit 45.6         Hemoglobin 14.8         Lymph # 2.0658         Lymphocyte % 33         Magnesium  1.90         MCH 28.6         MCHC 32.5         MCV 88.0         MDMA, Urine   Negative       Mono # 0.2504         Mono % 4         MPV 9.3         Neutrophils Relative 59         Opiates, Urine   Positive       pH, Urine   6.0       Phosphorus Level 3.6         Platelet Estimate Normal         Platelet Count 197         Potassium 4.7         RBC 5.18         RBC Morph Normal         RDW 16.3         Sodium 143         Specific Gravity, Urine Auto   >1.030       WBC 6.26          6.26                 Significant Imaging: I have reviewed all pertinent imaging results/findings within the past 24 hours.    Assessment and Plan:     Tremor of both hands  Likely 2/2 parkinson's disease  Improved with sinemet    -continue current dose sinemet following discharge  -will f/u with neurology o/p  -Further recommendations per MD          VTE Risk Mitigation (From admission, onward)           Ordered     apixaban tablet 5 mg  2 times daily         06/14/25 2004     IP VTE LOW RISK PATIENT  Once         06/14/25 2004     Place sequential compression device  Until discontinued         06/14/25 2004                    [facility]  Neurology  [facility] - [address] [place], [place] [address]etry  I have seen/examined the patient.  NP was scribe.  I agree with the findings unless outlined below:    Patrick Pereyra MD  [facility]     Feels great since starting sinemet  Rigidity essentially resolved  Rec continue same TID dose  Outpatient followup with  neurology  Signing off       [1]   Current Facility-Administered Medications   Medication Dose Route Frequency Provider Last Rate Last Admin    acetaminophen tablet 650 mg  650 mg Oral Q4H PRN Reyes, Thairy G, DO   650 mg at 06/16/25 1311    albuterol-ipratropium 2.5 mg-0.5 mg/3 mL nebulizer solution 3 mL  3 mL Nebulization Q6H PRN Reyes, Thairy G, DO        aluminum-magnesium hydroxide-simethicone 200-200-20 mg/5 mL suspension 30 mL  30 mL Oral QID PRN Reyes, Thairy G, DO        amantadine HCL capsule/tablet 200 mg  200 mg Oral Daily Norman Bueno MD   200 mg at 06/16/25 0815    apixaban tablet 5 mg  5 mg Oral BID Reyes, Thairy G, DO   5 mg at 06/16/25 0816    bisacodyL suppository 10 mg  10 mg Rectal Daily PRN Reyes, Thairy G, DO        buPROPion TBSR 12 hr tablet 100 mg  100 mg Oral BID Valeria Ponce MD        butalbital-acetaminophen-caffeine -40 mg per tablet 1 tablet  1 tablet Oral Q4H PRN Reyes, Thairy G, DO        carbidopa-levodopa  mg per tablet 1 tablet  1 tablet Oral TID Patrick Pereyra MD   1 tablet at 06/16/25 0815    cyclobenzaprine tablet 10 mg  10 mg Oral TID PRN Reyes, Thairy G, DO   10 mg at 06/15/25 1705    dextrose 50% injection 12.5 g  12.5 g Intravenous PRN Reyes, Thairy G, DO        dextrose 50% injection 25 g  25 g Intravenous PRN Reyes, Thairy G, DO        EScitalopram oxalate tablet 20 mg  20 mg Oral Daily Reyes, Thairy G, DO   20 mg at 06/16/25 0816    glucagon (human recombinant) injection 1 mg  1 mg Intramuscular PRN Reyes, Thairy G, DO        glucose chewable tablet 16 g  16 g Oral PRN Reyes, Thairy G, DO        glucose chewable tablet 24 g  24 g Oral PRN Reyes, Thairy G, DO        HYDROcodone-acetaminophen  mg per tablet 1 tablet  1 tablet Oral Q6H PRN Reyes, Thairy G, DO   1 tablet at 06/16/25 0815    hydrOXYzine HCL tablet 50 mg  50 mg Oral BID Reyes, Thairy G, DO   50 mg at 06/16/25 0815    magnesium sulfate 2g in water 50mL IVPB (premix)  2 g Intravenous Once  Reyes, Thairy G, DO        melatonin tablet 9 mg  9 mg Oral Nightly PRN Reyes, Thairy G, DO        naloxone 0.4 mg/mL injection 0.02 mg  0.02 mg Intravenous PRN Reyes, Thairy G, DO        ondansetron disintegrating tablet 8 mg  8 mg Oral Q8H PRN Reyes, Thairy G, DO        ondansetron injection 4 mg  4 mg Intravenous Q8H PRN Reyes, Thairy G, DO   4 mg at 06/15/25 0324    polyethylene glycol packet 17 g  17 g Oral TID PRN Reyes, Thairy G, DO        QUEtiapine tablet 300 mg  300 mg Oral QHS Doug Berkowitz, PMHNP        simethicone chewable tablet 80 mg  1 tablet Oral QID PRN Reyes, Thairy G, DO        sodium chloride 0.9% flush 10 mL  10 mL Intravenous PRN Reyes, Thairy G, DO        sucralfate tablet 1 g  1 g Oral BID Reyes, Thairy G, DO   1 g at 06/16/25 0815    sumatriptan tablet 50 mg  50 mg Oral Daily PRN Reyes, Thairy G, DO        tamsulosin 24 hr capsule 0.4 mg  1 capsule Oral Daily Reyes, Thairy G, DO   0.4 mg at 06/16/25 0816    venlafaxine 24 hr capsule 150 mg  150 mg Oral Daily Reyes, Thairy G, DO   150 mg at 06/16/25 0815

## 2025-06-16 NOTE — CONSULTS
[facility]   Consult Note  Inpatient consult to Gastroenterology  Consult performed by: Cathy Garay FNP  Consult ordered by: Valeria Ponce MD             GI consulted for nausea and vomiting, significant weight loss    HPI:  He is a 54-year-old male unknown to our service with a PMH of PE/DVT on Eliquis prior longstanding methamphetamine abuse, marijuana use, bipolar disorder who was admitted with suicidal ideations.  He does also reports issues over the past year with unintentional weight loss as well as esophageal dysphagia.  Had a colonoscopy last year with Dr. Slim Murguia that was unremarkable.  Subsequently underwent EGD around 5 months ago per his report with empiric dilation.  States dysphagia symptoms improved initially, however returned shortly after.  Reports globus sensation in addition to vomiting intermittently with PO intake.  Patient also reported tremors, seen by Neurology concern for Parkinson's noted.  Initiated on medication per them.  No current abdominal imaging, however contrasted CT of the chest, abdomen, and pelvis obtained in 07/2023 for the same complaints showed possible mild acute diverticulitis at the junction of the descending and sigmoid colon, borderline hepatomegaly, mild mucosal prominence at the lower esophagus, cholelithiasis, and borderline prostatomegaly.  Patient does report approximately a 100 lb weight loss over the past year        ROS: Negative unless stated in HPI    Medical History:   Past Medical History:   Diagnosis Date    Anxiety     Arthritis     Asthma     Back pain     Balance problems     Cervical herniated disc     Depression     Diabetes mellitus     Dry cough     DVT (deep venous thrombosis)     Enlarged prostate     Gout     Hemoptysis     HLD (hyperlipidemia)     Insomnia     Leg weakness     Lung mass     Migraines     Neck pain     Neuropathy     Obesity     PONV (postoperative nausea and vomiting)     Pulmonary  embolism     Spinal stenosis     TIA (transient ischemic attack)     Use of cane as ambulatory aid     Walker as ambulation aid        Surgical History:   Past Surgical History:   Procedure Laterality Date    ABCESS DRAINAGE      scrotal cyst    BRONCHIAL IRRIGATION  11/1/2023    Procedure: IRRIGATION, BRONCHUS;  Surgeon: ERIS George MD;  Location: [facility] BRONCHOSCOPY LAB;  Service: Pulmonary;;  RLL    CERVICAL FUSION      COLONOSCOPY W/ POLYPECTOMY  10/2023    FLEXIBLE BRONCHOSCOPY N/A 11/1/2023    Procedure: BRONCHOSCOPY, FIBEROPTIC;  Surgeon: ERIS George MD;  Location: [facility] BRONCHOSCOPY LAB;  Service: Pulmonary;  Laterality: N/A;    LUMBAR FUSION      NASAL POLYP EXCISION      PARTIAL KNEE ARTHROPLASTY Left     ROTATOR CUFF REPAIR Right     TYMPANOPLASTY Right        Social History:  Social History     Tobacco Use    Smoking status: Every Day     Current packs/day: 0.25     Average packs/day: 0.5 packs/day for 45.5 years (22.2 ttl pk-yrs)     Types: Cigarettes     Start date: 1980    Smokeless tobacco: Never   Substance Use Topics    Alcohol use: Never       Family History:  Reviewed - noncontributory     Allergies:  Review of patient's allergies indicates:   Allergen Reactions    Buprenorphine-naloxone     Tylox [oxycodone-acetaminophen]     Iodinated contrast media Nausea Only    Nitrous oxide Nausea And Vomiting       MEDS: Reviewed in EMR    PHYSICAL EXAM:  T 98.4 °F (36.9 °C)   /79   P 71   RR 20   O2 99 %  GENERAL: NAD; does not appear toxic  SKIN: no rash, no jaundice  HEENT: sclera non-icteric; PERRL  NECK: supple; no LAD  CHEST: CTA; nonlabored, equal expansion; no adventitious BS  CARDIOVASCULAR: RRR, S1S2; no murmur; strong, equal peripheral pulses; no edema  ABDOMEN:  active bowel sounds; abdomen soft, nondistended, nontender to palpation  EXTREMITIES: no cyanosis or clubbing  NEURO: AAO, baseline    Labs:   Recent Labs     06/14/25  1625 06/16/25  0644   WBC 11.02 6.26  6.26   RBC  "6.21* 5.18   HGB 17.9 14.8   HCT 54.3* 45.6   MCV 87.4 88.0   MCH 28.8 28.6   MCHC 33.0 32.5*   RDW 17.2* 16.3    197     No results for input(s): "LACTIC" in the last 72 hours.  Recent Labs     06/14/25  1625   INR 1.1   APTT 32.8     No results for input(s): "HGBA1C", "CHOL", "TRIG", "LDL", "VLDL", "HDL" in the last 72 hours.   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  He does also reports issues over the past year with unintentional weight loss as well as esophageal dysphagia.  Had a colonoscopy last year with Dr. Slim Murguia that was unremarkable.  Subsequently underwent EGD around 5 months ago per his report with empiric dilation.  States dysphagia symptoms improved initially, however returned shortly after.  Reports globus sensation in addition to vomiting intermittently with PO intake.  Patient also reported tremors, seen by Neurology concern for Parkinson's noted.  Initiated on medication per them.  No current abdominal imaging, however contrasted CT of the chest, abdomen, and pelvis obtained in 07/2023 for the same complaints showed possible mild acute diverticulitis at the junction of the descending and sigmoid colon, borderline hepatomegaly, mild mucosal prominence at the lower esophagus, cholelithiasis, and borderline " prostatomegaly.  Patient does report approximately a 100 lb weight loss over the past year.     GI consulted for N/V, weight loss.    Globus sensation / esophageal dysphagia  Unintentional weight loss  Hypercoagulable state on Eliquis      Will get esophogram  Pending the above, consider EGD  Send hypercoagulable w/u  Diet as tolerated   Start PPI PO BID.   Further to follow with MD on rounds.   Discussed with patient and nursing.       Thank you for allowing us to participate in the care of Aram Thurston.    Cathy Garay, RADHA  [facility]

## 2025-06-17 LAB
ANION GAP SERPL CALC-SCNC: 7 MEQ/L
BASOPHILS # BLD AUTO: 0.16 X10(3)/MCL
BASOPHILS NFR BLD AUTO: 1.9 %
BUN SERPL-MCNC: 9.8 MG/DL (ref 8.4–25.7)
CALCIUM SERPL-MCNC: 9.6 MG/DL (ref 8.4–10.2)
CHLORIDE SERPL-SCNC: 108 MMOL/L (ref 98–107)
CO2 SERPL-SCNC: 28 MMOL/L (ref 22–29)
CREAT SERPL-MCNC: 0.77 MG/DL (ref 0.72–1.25)
CREAT/UREA NIT SERPL: 13
EOSINOPHIL # BLD AUTO: 0.22 X10(3)/MCL (ref 0–0.9)
EOSINOPHIL NFR BLD AUTO: 2.6 %
ERYTHROCYTE [DISTWIDTH] IN BLOOD BY AUTOMATED COUNT: 16.4 % (ref 11.5–17)
FOLATE SERPL-MCNC: 5.9 NG/ML (ref 7–31.4)
GFR SERPLBLD CREATININE-BSD FMLA CKD-EPI: >60 ML/MIN/1.73/M2
GLUCOSE SERPL-MCNC: 98 MG/DL (ref 74–100)
HAV IGM SERPL QL IA: NONREACTIVE
HBV CORE AB SERPL QL IA: NONREACTIVE
HBV CORE IGM SERPL QL IA: NONREACTIVE
HBV SURFACE AG SERPL QL IA: NONREACTIVE
HCT VFR BLD AUTO: 48.2 % (ref 42–52)
HCV AB SERPL QL IA: NONREACTIVE
HGB BLD-MCNC: 15.5 G/DL (ref 14–18)
HIV 1+2 AB+HIV1 P24 AG SERPL QL IA: NONREACTIVE
IMM GRANULOCYTES # BLD AUTO: 0.03 X10(3)/MCL (ref 0–0.04)
IMM GRANULOCYTES NFR BLD AUTO: 0.4 %
LYMPHOCYTES # BLD AUTO: 2.28 X10(3)/MCL (ref 0.6–4.6)
LYMPHOCYTES NFR BLD AUTO: 27 %
MCH RBC QN AUTO: 28.4 PG (ref 27–31)
MCHC RBC AUTO-ENTMCNC: 32.2 G/DL (ref 33–36)
MCV RBC AUTO: 88.3 FL (ref 80–94)
MONOCYTES # BLD AUTO: 0.48 X10(3)/MCL (ref 0.1–1.3)
MONOCYTES NFR BLD AUTO: 5.7 %
NEUTROPHILS # BLD AUTO: 5.27 X10(3)/MCL (ref 2.1–9.2)
NEUTROPHILS NFR BLD AUTO: 62.4 %
NRBC BLD AUTO-RTO: 0 %
PATH REV: NORMAL
PLATELET # BLD AUTO: 226 X10(3)/MCL (ref 130–400)
PMV BLD AUTO: 9.2 FL (ref 7.4–10.4)
POTASSIUM SERPL-SCNC: 4.5 MMOL/L (ref 3.5–5.1)
RBC # BLD AUTO: 5.46 X10(6)/MCL (ref 4.7–6.1)
SODIUM SERPL-SCNC: 143 MMOL/L (ref 136–145)
T PALLIDUM AB SER QL: NONREACTIVE
VIT B12 SERPL-MCNC: 781 PG/ML (ref 213–816)
WBC # BLD AUTO: 8.44 X10(3)/MCL (ref 4.5–11.5)

## 2025-06-17 PROCEDURE — 86431 RHEUMATOID FACTOR QUANT: CPT | Performed by: NURSE PRACTITIONER

## 2025-06-17 PROCEDURE — 25000003 PHARM REV CODE 250: Performed by: NURSE PRACTITIONER

## 2025-06-17 PROCEDURE — 86146 BETA-2 GLYCOPROTEIN ANTIBODY: CPT | Performed by: NURSE PRACTITIONER

## 2025-06-17 PROCEDURE — 80048 BASIC METABOLIC PNL TOTAL CA: CPT | Performed by: STUDENT IN AN ORGANIZED HEALTH CARE EDUCATION/TRAINING PROGRAM

## 2025-06-17 PROCEDURE — 86148 ANTI-PHOSPHOLIPID ANTIBODY: CPT | Performed by: NURSE PRACTITIONER

## 2025-06-17 PROCEDURE — 80074 ACUTE HEPATITIS PANEL: CPT | Performed by: NURSE PRACTITIONER

## 2025-06-17 PROCEDURE — 25000003 PHARM REV CODE 250: Performed by: STUDENT IN AN ORGANIZED HEALTH CARE EDUCATION/TRAINING PROGRAM

## 2025-06-17 PROCEDURE — 82746 ASSAY OF FOLIC ACID SERUM: CPT | Performed by: NURSE PRACTITIONER

## 2025-06-17 PROCEDURE — 82607 VITAMIN B-12: CPT | Performed by: NURSE PRACTITIONER

## 2025-06-17 PROCEDURE — 85025 COMPLETE CBC W/AUTO DIFF WBC: CPT | Performed by: STUDENT IN AN ORGANIZED HEALTH CARE EDUCATION/TRAINING PROGRAM

## 2025-06-17 PROCEDURE — 86038 ANTINUCLEAR ANTIBODIES: CPT | Performed by: NURSE PRACTITIONER

## 2025-06-17 PROCEDURE — 86704 HEP B CORE ANTIBODY TOTAL: CPT | Performed by: NURSE PRACTITIONER

## 2025-06-17 PROCEDURE — 36415 COLL VENOUS BLD VENIPUNCTURE: CPT | Performed by: NURSE PRACTITIONER

## 2025-06-17 PROCEDURE — 86780 TREPONEMA PALLIDUM: CPT | Performed by: NURSE PRACTITIONER

## 2025-06-17 PROCEDURE — 87389 HIV-1 AG W/HIV-1&-2 AB AG IA: CPT | Performed by: NURSE PRACTITIONER

## 2025-06-17 PROCEDURE — 86039 ANTINUCLEAR ANTIBODIES (ANA): CPT | Performed by: NURSE PRACTITIONER

## 2025-06-17 PROCEDURE — 63600175 PHARM REV CODE 636 W HCPCS: Performed by: STUDENT IN AN ORGANIZED HEALTH CARE EDUCATION/TRAINING PROGRAM

## 2025-06-17 PROCEDURE — 25000003 PHARM REV CODE 250: Performed by: INTERNAL MEDICINE

## 2025-06-17 PROCEDURE — 25000003 PHARM REV CODE 250: Performed by: PSYCHIATRY & NEUROLOGY

## 2025-06-17 PROCEDURE — 11000001 HC ACUTE MED/SURG PRIVATE ROOM

## 2025-06-17 PROCEDURE — 86036 ANCA SCREEN EACH ANTIBODY: CPT | Performed by: NURSE PRACTITIONER

## 2025-06-17 RX ORDER — ENOXAPARIN SODIUM 100 MG/ML
40 INJECTION SUBCUTANEOUS EVERY 24 HOURS
Status: DISCONTINUED | OUTPATIENT
Start: 2025-06-17 | End: 2025-06-18 | Stop reason: HOSPADM

## 2025-06-17 RX ADMIN — ESCITALOPRAM OXALATE 20 MG: 10 TABLET ORAL at 10:06

## 2025-06-17 RX ADMIN — ENOXAPARIN SODIUM 40 MG: 40 INJECTION SUBCUTANEOUS at 05:06

## 2025-06-17 RX ADMIN — SENNOSIDES 8.6 MG: 8.6 TABLET, FILM COATED ORAL at 10:06

## 2025-06-17 RX ADMIN — HYDROCODONE BITARTRATE AND ACETAMINOPHEN 1 TABLET: 10; 325 TABLET ORAL at 10:06

## 2025-06-17 RX ADMIN — SUCRALFATE 1 G: 1 TABLET ORAL at 10:06

## 2025-06-17 RX ADMIN — PANTOPRAZOLE SODIUM 40 MG: 40 TABLET, DELAYED RELEASE ORAL at 05:06

## 2025-06-17 RX ADMIN — APIXABAN 5 MG: 5 TABLET, FILM COATED ORAL at 10:06

## 2025-06-17 RX ADMIN — HYDROXYZINE HYDROCHLORIDE 50 MG: 25 TABLET, FILM COATED ORAL at 09:06

## 2025-06-17 RX ADMIN — CARBIDOPA AND LEVODOPA 1 TABLET: 10; 100 TABLET ORAL at 10:06

## 2025-06-17 RX ADMIN — AMANTADINE HYDROCHLORIDE 200 MG: 100 CAPSULE, LIQUID FILLED ORAL at 10:06

## 2025-06-17 RX ADMIN — CARBIDOPA AND LEVODOPA 1 TABLET: 10; 100 TABLET ORAL at 09:06

## 2025-06-17 RX ADMIN — POLYETHYLENE GLYCOL 3350 17 G: 17 POWDER, FOR SOLUTION ORAL at 10:06

## 2025-06-17 RX ADMIN — QUETIAPINE FUMARATE 300 MG: 100 TABLET ORAL at 09:06

## 2025-06-17 RX ADMIN — CARBIDOPA AND LEVODOPA 1 TABLET: 10; 100 TABLET ORAL at 05:06

## 2025-06-17 RX ADMIN — VENLAFAXINE HYDROCHLORIDE 150 MG: 150 CAPSULE, EXTENDED RELEASE ORAL at 10:06

## 2025-06-17 RX ADMIN — BUPROPION HYDROCHLORIDE 100 MG: 100 TABLET, FILM COATED, EXTENDED RELEASE ORAL at 10:06

## 2025-06-17 RX ADMIN — TAMSULOSIN HYDROCHLORIDE 0.4 MG: 0.4 CAPSULE ORAL at 10:06

## 2025-06-17 RX ADMIN — HYDROCODONE BITARTRATE AND ACETAMINOPHEN 1 TABLET: 10; 325 TABLET ORAL at 05:06

## 2025-06-17 RX ADMIN — HYDROXYZINE HYDROCHLORIDE 50 MG: 25 TABLET, FILM COATED ORAL at 10:06

## 2025-06-17 RX ADMIN — SUCRALFATE 1 G: 1 TABLET ORAL at 09:06

## 2025-06-17 NOTE — PROGRESS NOTES
[facility]  Hospital Medicine Progress Note        Chief Complaint: Inpatient Follow-up for     HPI:   54-year-old male with a past medical history of bipolar disorder, spasticity, left DVT and PE (on Eliquis), cord compression/myelomalacia former longstanding methamphetamine use, tobacco dependence, marijuana use who presents after holding a gun to his head.  Patient was PEC'd in the emergency room.  Patient states he has been experiencing unintentional weight loss and several months of vomiting, all-over pain and headache that led to him wanting to end it all today. Denies HI     Pain management Dr. Sheehan in [place].  Psychiatry Dr. Garibay.   Neurology: Dr. Ivelisse Siddiqui     At baseline the patient lives with his wife and ambulates with a cane.    6/15: NSG has signed off as patient is being worked up outpatient by NSG. Neurology consulted as patient claims to have Parkinson's however there is no medications on file; possibly this is Parkinson secondary to chronic meth abuse.  Nausea and vomiting may resolve if started on appropriate medications.  Additionally we will consult GI as patient has chronic N/V with alarming symptoms of severe weight loss.  Psych following for suicidal ideation.    6/16:Tremors have improved after neurology has started carbidopa/levodopa.  Patient's nausea and vomiting has improved however he does mention that it is hard for him to swallow medications and solids.  Due to his history of requiring esophageal dilation, we will await for GI eval. Psych following.  PEC still in place.    Interval Hx:   Patient was seen and evaluate at bedside, oxygenating well on RA.  No events overnight.  Currently on carbidopa/levodopa.  GI has evaluated the patient and recommends esophagram and follow up with outpatient GI.  Psych following.  PEC still in place.  Psychiatry recommends holding patient for now and possible inpatient admission to psychiatric  facility at discharge.    Case was discussed with patient's nurse and  on the floor.    Objective/physical exam:  General: In no acute distress, afebrile  Chest: Clear to auscultation bilaterally  Heart: RRR, +S1, S2, no appreciable murmur  Abdomen: Soft, nontender, BS +  MSK: Warm, no lower extremity edema, no clubbing or cyanosis  Neurologic: Alert and oriented x4, Cranial nerve II-XII intact, Strength 5/5 in all 4 extremities    VITAL SIGNS: 24 HRS MIN & MAX LAST   Temp  Min: 97.6 °F (36.4 °C)  Max: 98.4 °F (36.9 °C) 98.4 °F (36.9 °C)   BP  Min: 103/66  Max: 136/88 136/88   Pulse  Min: 55  Max: 138  76   Resp  Min: 16  Max: 20 18   SpO2  Min: 96 %  Max: 100 % 98 %     I have reviewed the following labs:  Recent Labs   Lab 06/14/25  1625 06/16/25  0644 06/17/25  0847   WBC 11.02 6.26  6.26 8.44   RBC 6.21* 5.18 5.46   HGB 17.9 14.8 15.5   HCT 54.3* 45.6 48.2   MCV 87.4 88.0 88.3   MCH 28.8 28.6 28.4   MCHC 33.0 32.5* 32.2*   RDW 17.2* 16.3 16.4    197 226   MPV 10.3 9.3 9.2     Recent Labs   Lab 06/14/25  1647 06/15/25  0627 06/16/25  0644 06/17/25  0847    140 143 143   K 3.5 5.1 4.7 4.5    107 109* 108*   CO2 23 24 25 28   BUN 12.0 12.4 10.5 9.8   CREATININE 0.93 0.88 0.78 0.77   * 106* 93 98   CALCIUM 9.7 9.5 9.2 9.6   MG 1.70  --  1.90  --    ALBUMIN 4.2 4.0  --   --    PROT 8.4* 7.8  --   --    ALKPHOS 159* 142  --   --    ALT 29 27  --   --    AST 16 16  --   --    BILITOT 1.4 1.5  --   --      Microbiology Results (last 7 days)       Procedure Component Value Units Date/Time    Stool Culture [[id number]]     Order Status: Sent Specimen: Stool     Clostridium Diff Toxin, A & B, EIA [[id number]]     Order Status: Canceled Specimen: Stool              See below for Radiology    Assessment/Plan:  History of Parkinson's disease?  -as per patient  -on amantadine at home  -possibly due to chronic meth use patient is experiencing Parkinsonism  symptoms  -neurology following  and has started carbidopa/levodopa.  -patient has noted significant improvement in tremors, nausea and vomiting    Protein calorie malnutrition   -secondary to persistent nausea/vomiting   -patient states he has lost 167 lb unintentionally ?   -GI consult or N/V and dysphagia; likely due to untreated parkinsonism  -N/V and dysphagia have improved after starting Sinemet  -GI has seen the patient and recommends an esophagram with outpatient follow up with GI as patient is able to tolerate solids and liquids at this time.    SI  History of bipolar disorder  -1:1, PEC'ed in the ED on 6/14  -Psychiatry consulted  -psychiatry has made recommendations on medications  -psychiatry wants to continue to hold the patient as they are they are a high-risk patient.  -I agree to the statement above as [facility].     left DVT and PE  -c/w Eliquis     Spasticity  Cord compression/myelomalacia  -followed by Dr. Venegas in [place]  -MRI from 3/7 of thoracic spine showed: Severe spinal canal stenosis at T10-T11 with associated cord compression and   intramedullary T2 hyperintense signal compatible with compressive myelopathy  -cervical spine MRI showed Moderate spinal canal stenosis at C2-C3   -neurosurgery consulted but he is also established in [place] with spine surgeon, Dr. Jin Demacro  -NSG has signed off  -PT OT eval pending     Elevated ALP   -GGT within normal limits   -right upper quadrant ultrasound with cholelithiasis, no evidence of cholecystitis     Headache   -Fioricet trial   -if no improvement may need further evaluation with imaging     Tobacco dependence  Marijuana use   -states he quit May 8th, previously half pack per day     History of former longstanding methamphetamine use       VTE prophylaxis: lovenox  Patient condition:  Stable  Anticipated discharge and Disposition:   placement?      All diagnosis and differential diagnosis have been reviewed; assessment and plan has been documented; I have  personally reviewed the labs and test results that are presently available; I have reviewed the patients medication list; I have reviewed the consulting providers response and recommendations. I have reviewed or attempted to review medical records based upon their availability    All of the patient's questions have been  addressed and answered. Patient's is agreeable to the above stated plan. I will continue to monitor closely and make adjustments to medical management as needed.    Portions of this note dictated using EMR integrated voice recognition software, and may be subject to voice recognition errors not corrected at proofreading. Please contact writer for clarification if needed.   _____________________________________________________________________    Malnutrition Status:  Nutrition consulted. Most recent weight and BMI monitored-     Measurements:  Wt Readings from Last 1 Encounters:   06/14/25 79.4 kg (175 lb)   Body mass index is 22.47 kg/m².    Patient has been screened and assessed by RD.    Malnutrition Type:  Context: chronic illness  Level: severe    Malnutrition Characteristic Summary:  Weight Loss (Malnutrition): greater than 20% in 1 year  Energy Intake (Malnutrition): less than or equal to 75% for greater than or equal to 1 month  Subcutaneous Fat (Malnutrition): mild depletion  Muscle Mass (Malnutrition): moderate depletion  Fluid Accumulation (Malnutrition): other (see comments) (Not present)  Hand  Strength, Right (Malnutrition): Unable to assess    Interventions/Recommendations (treatment strategy):  Oral nutritional supplement     Scheduled Med:   amantadine HCL  200 mg Oral Daily    carbidopa-levodopa  mg  1 tablet Oral TID    enoxparin  40 mg Subcutaneous Q24H (prophylaxis, 1700)    EScitalopram oxalate  20 mg Oral Daily    hydrOXYzine  50 mg Oral BID    magnesium sulfate 2 g IVPB  2 g Intravenous Once    pantoprazole  40 mg Oral BID AC    polyethylene glycol  17 g Oral Daily     QUEtiapine  300 mg Oral QHS    senna  8.6 mg Oral Daily    sucralfate  1 g Oral BID    tamsulosin  1 capsule Oral Daily      Continuous Infusions:     PRN Meds:    Current Facility-Administered Medications:     acetaminophen, 650 mg, Oral, Q4H PRN    albuterol-ipratropium, 3 mL, Nebulization, Q6H PRN    aluminum-magnesium hydroxide-simethicone, 30 mL, Oral, QID PRN    bisacodyL, 10 mg, Rectal, Daily PRN    butalbital-acetaminophen-caffeine -40 mg, 1 tablet, Oral, Q4H PRN    cyclobenzaprine, 10 mg, Oral, TID PRN    dextrose 50%, 12.5 g, Intravenous, PRN    dextrose 50%, 25 g, Intravenous, PRN    glucagon (human recombinant), 1 mg, Intramuscular, PRN    glucose, 16 g, Oral, PRN    glucose, 24 g, Oral, PRN    HYDROcodone-acetaminophen, 1 tablet, Oral, Q6H PRN    melatonin, 9 mg, Oral, Nightly PRN    naloxone, 0.02 mg, Intravenous, PRN    ondansetron, 8 mg, Oral, Q8H PRN    ondansetron, 4 mg, Intravenous, Q8H PRN    simethicone, 1 tablet, Oral, QID PRN    sodium chloride 0.9%, 10 mL, Intravenous, PRN    sumatriptan, 50 mg, Oral, Daily PRN     Radiology:  I have personally reviewed the following imaging and agree with the radiologist.     US Abdomen Limited  Narrative: EXAMINATION:  US ABDOMEN LIMITED    CLINICAL HISTORY:  Elevated liver function test    TECHNIQUE:  Multiple real-time transverse and longitudinal sections were performed of the right abdomen by the sonographer. Select images were submitted for review.    COMPARISON:  CT abdomen pelvis July 31, 2023.    FINDINGS:  Liver is of unremarkable echotexture with normal contour and size. There was no delineation of discrete hepatic cystic or solid mass. Hepatic maximum diameter of 15.3 cm. There was unremarkable hepatopedal flow within the portal vein.  Pancreas obscured by overlying bowel gas.    Gallbladder lumen contains several echogenic foci consistent with cholelithiasis.  Largest calculus measures 2.0 x 1.2 cm.  Gallbladder wall thickness is within  normal limits. Common bile duct caliber of 3 point mm is within normal limits for the age. Sonographer reported negative Lake's sign.    Inferior vena cava and abdominal aorta are not visualized due to overlying bowel gas    Normally located right kidney length measures 9.8 x 5.8 x 5.0 cm. Right renal corticomedullary differentiation is unremarkable. No evidence of hydronephrosis.  Impression: 1.  Multiple gallstones without sonographic findings of acute cholecystitis.    2.  Details of other findings above.    Electronically signed by: Omari Mcclain  Date:    06/14/2025  Time:    21:18  X-Ray Chest 1 View  Narrative: EXAMINATION:  XR CHEST 1 VIEW    CLINICAL HISTORY:  Chest pain, unspecified    TECHNIQUE:  One view    COMPARISON:  None available.    FINDINGS:  Cardiopericardial silhouette is within normal limits.  No acute dense focal or segmental consolidation, congestive process, pleural effusions or pneumothorax.  Partially visualized cervicothoracic fusions.  Impression: No acute cardiopulmonary process identified.    Electronically signed by: Omari Mcclain  Date:    06/14/2025  Time:    17:04      Valeria Ponce MD  Department of Hospital Medicine   [facility]   06/17/2025

## 2025-06-17 NOTE — PROGRESS NOTES
"6/17/2025  Aram Thurston   1970   [id number]        Psychiatry Progress Note       SUBJECTIVE:   Aram Thurston is a 54 y.o. male with a past medical history of bipolar disorder, spasticity, left DVT and PE (on Eliquis), cord compression/myelomalacia former longstanding methamphetamine use, tobacco dependence, marijuana use who presents after holding a gun to his head. Patient was PEC'd in the emergency room. Patient states he has been experiencing unintentional weight loss and several months of vomiting, all-over pain and headache that led to him wanting to end it all today.     Parkinsonism with tremor and pill-rolling motions noted upon entering the room. Reports he feels "better" but vague when asked what is better other than that he can tolerate medications. Endorses euthymic mood and denies SI, HI, or auditory/visual hallucinations. Reports improved sleep.           Current Medications:   Scheduled Meds:    amantadine HCL  200 mg Oral Daily    carbidopa-levodopa  mg  1 tablet Oral TID    enoxparin  40 mg Subcutaneous Q24H (prophylaxis, 1700)    EScitalopram oxalate  20 mg Oral Daily    hydrOXYzine  50 mg Oral BID    magnesium sulfate 2 g IVPB  2 g Intravenous Once    pantoprazole  40 mg Oral BID AC    polyethylene glycol  17 g Oral Daily    QUEtiapine  300 mg Oral QHS    senna  8.6 mg Oral Daily    sucralfate  1 g Oral BID    tamsulosin  1 capsule Oral Daily      PRN Meds:   Current Facility-Administered Medications:     acetaminophen, 650 mg, Oral, Q4H PRN    albuterol-ipratropium, 3 mL, Nebulization, Q6H PRN    aluminum-magnesium hydroxide-simethicone, 30 mL, Oral, QID PRN    bisacodyL, 10 mg, Rectal, Daily PRN    butalbital-acetaminophen-caffeine -40 mg, 1 tablet, Oral, Q4H PRN    cyclobenzaprine, 10 mg, Oral, TID PRN    dextrose 50%, 12.5 g, Intravenous, PRN    dextrose 50%, 25 g, Intravenous, PRN    glucagon (human recombinant), 1 mg, Intramuscular, PRN    glucose, 16 g, " Oral, PRN    glucose, 24 g, Oral, PRN    HYDROcodone-acetaminophen, 1 tablet, Oral, Q6H PRN    melatonin, 9 mg, Oral, Nightly PRN    naloxone, 0.02 mg, Intravenous, PRN    ondansetron, 8 mg, Oral, Q8H PRN    ondansetron, 4 mg, Intravenous, Q8H PRN    simethicone, 1 tablet, Oral, QID PRN    sodium chloride 0.9%, 10 mL, Intravenous, PRN    sumatriptan, 50 mg, Oral, Daily PRN   Psychotherapeutics (From admission, onward)      Start     Stop Route Frequency Ordered    06/16/25 2100  QUEtiapine tablet 300 mg         -- Oral Nightly 06/15/25 1305    06/15/25 0900  EScitalopram oxalate tablet 20 mg         -- Oral Daily 06/14/25 2004            Allergies:   Review of patient's allergies indicates:   Allergen Reactions    Buprenorphine-naloxone     Tylox [oxycodone-acetaminophen]     Iodinated contrast media Nausea Only    Nitrous oxide Nausea And Vomiting        OBJECTIVE:   Vitals   Vitals:    06/17/25 1135   BP: 136/88   Pulse: 76   Resp:    Temp: 98.4 °F (36.9 °C)        Labs/Imaging/Studies:   Recent Results (from the past 36 hours)   Basic Metabolic Panel    Collection Time: 06/16/25  6:44 AM   Result Value Ref Range    Sodium 143 136 - 145 mmol/L    Potassium 4.7 3.5 - 5.1 mmol/L    Chloride 109 (H) 98 - 107 mmol/L    CO2 25 22 - 29 mmol/L    Glucose 93 74 - 100 mg/dL    Blood Urea Nitrogen 10.5 8.4 - 25.7 mg/dL    Creatinine 0.78 0.72 - 1.25 mg/dL    BUN/Creatinine Ratio 13     Calcium 9.2 8.4 - 10.2 mg/dL    Anion Gap 9.0 mEq/L    eGFR >60 mL/min/1.73/m2   Magnesium    Collection Time: 06/16/25  6:44 AM   Result Value Ref Range    Magnesium Level 1.90 1.60 - 2.60 mg/dL   Phosphorus    Collection Time: 06/16/25  6:44 AM   Result Value Ref Range    Phosphorus Level 3.6 2.3 - 4.7 mg/dL   CBC with Differential    Collection Time: 06/16/25  6:44 AM   Result Value Ref Range    WBC 6.26 4.50 - 11.50 x10(3)/mcL    RBC 5.18 4.70 - 6.10 x10(6)/mcL    Hgb 14.8 14.0 - 18.0 g/dL    Hct 45.6 42.0 - 52.0 %    MCV 88.0 80.0 - 94.0  fL    MCH 28.6 27.0 - 31.0 pg    MCHC 32.5 (L) 33.0 - 36.0 g/dL    RDW 16.3 11.5 - 17.0 %    Platelet 197 130 - 400 x10(3)/mcL    MPV 9.3 7.4 - 10.4 fL   Manual Differential    Collection Time: 06/16/25  6:44 AM   Result Value Ref Range    WBC 6.26 x10(3)/mcL    Neutrophils % 59 %    Lymphs % 33 %    Monocytes % 4 %    Eosinophils % 3 %    Basophils % 1 %    Neutrophils Abs 3.6934 2.1 - 9.2 x10(3)/mcL    Lymphs Abs 2.0658 0.6 - 4.6 x10(3)/mcL    Monocytes Abs 0.2504 0.1 - 1.3 x10(3)/mcL    Eosinophils Abs 0.1878 0 - 0.9 x10(3)/mcL    Basophils Abs 0.0626 0 - 0.2 x10(3)/mcL    Platelets Normal Normal, Adequate    RBC Morph Normal Normal   Basic Metabolic Panel    Collection Time: 06/17/25  8:47 AM   Result Value Ref Range    Sodium 143 136 - 145 mmol/L    Potassium 4.5 3.5 - 5.1 mmol/L    Chloride 108 (H) 98 - 107 mmol/L    CO2 28 22 - 29 mmol/L    Glucose 98 74 - 100 mg/dL    Blood Urea Nitrogen 9.8 8.4 - 25.7 mg/dL    Creatinine 0.77 0.72 - 1.25 mg/dL    BUN/Creatinine Ratio 13     Calcium 9.6 8.4 - 10.2 mg/dL    Anion Gap 7.0 mEq/L    eGFR >60 mL/min/1.73/m2   HIV 1/2 Ag/Ab (4th Gen)    Collection Time: 06/17/25  8:47 AM   Result Value Ref Range    HIV Nonreactive Nonreactive   Folate    Collection Time: 06/17/25  8:47 AM   Result Value Ref Range    Folate Level 5.9 (L) 7.0 - 31.4 ng/mL   Vitamin B12    Collection Time: 06/17/25  8:47 AM   Result Value Ref Range    Vitamin B12 781 213 - 816 pg/mL   Hepatitis Panel, Acute    Collection Time: 06/17/25  8:47 AM   Result Value Ref Range    Hep A IgM Interp Nonreactive Nonreactive    Hep B Core IgM Interp Nonreactive Nonreactive    Hep BsAg Interp Nonreactive Nonreactive    Hep C Ab Interp Nonreactive Nonreactive   Hepatitis B Core Antibody, Total    Collection Time: 06/17/25  8:47 AM   Result Value Ref Range    Hep BcAb Interp Nonreactive Nonreactive   SYPHILIS ANTIBODY (WITH REFLEX RPR)    Collection Time: 06/17/25  8:47 AM   Result Value Ref Range    Syphilis  Antibody Nonreactive Nonreactive, Equivocal   CBC with Differential    Collection Time: 06/17/25  8:47 AM   Result Value Ref Range    WBC 8.44 4.50 - 11.50 x10(3)/mcL    RBC 5.46 4.70 - 6.10 x10(6)/mcL    Hgb 15.5 14.0 - 18.0 g/dL    Hct 48.2 42.0 - 52.0 %    MCV 88.3 80.0 - 94.0 fL    MCH 28.4 27.0 - 31.0 pg    MCHC 32.2 (L) 33.0 - 36.0 g/dL    RDW 16.4 11.5 - 17.0 %    Platelet 226 130 - 400 x10(3)/mcL    MPV 9.2 7.4 - 10.4 fL    Neut % 62.4 %    Lymph % 27.0 %    Mono % 5.7 %    Eos % 2.6 %    Basophil % 1.9 %    Imm Grans % 0.4 %    Neut # 5.27 2.1 - 9.2 x10(3)/mcL    Lymph # 2.28 0.6 - 4.6 x10(3)/mcL    Mono # 0.48 0.1 - 1.3 x10(3)/mcL    Eos # 0.22 0 - 0.9 x10(3)/mcL    Baso # 0.16 <=0.2 x10(3)/mcL    Imm Gran # 0.03 0.00 - 0.04 x10(3)/mcL    NRBC% 0.0 %       Psychiatric Mental Status Exam:  General Appearance: appears stated age, dressed in hospital garb, lying in bed, in no acute distress  Arousal: alert  Behavior: cooperative, appropriate eye-contact, under good behavioral control  Movements and Motor Activity: +parkinsonism  Orientation: oriented to person, place, and time  Speech: normal rate, rhythm, volume, tone and pitch  Mood: Euthymic  Affect: reactive, full-range  Thought Process: linear, goal-directed  Associations: no loosening of associations  Thought Content and Perceptions: no suicidal or homicidal ideation, no auditory or visual hallucinations, no paranoid ideation, no ideas of reference, no evidence of delusions or psychosis  Recent and Remote Memory: grossly intact; per interview/observation with patient  Attention and Concentration: grossly intact; per interview/observation with patient  Fund of Knowledge: vocabulary appropriate; based on history, vocabulary, fund of knowledge, syntax, grammar, and content  Insight: questionable; based on understanding of severity of illness and HPI  Judgment: questionable; based on patient's behavior and HPI       ASSESSMENT/PLAN:   Problems  Addressed/Diagnoses:  Major Depressive Disorder, Recurrent, Severe without psychosis (F33.2)     Past Medical History:   Diagnosis Date    Anxiety     Arthritis     Asthma     Back pain     Balance problems     Cervical herniated disc     Depression     Diabetes mellitus     Dry cough     DVT (deep venous thrombosis)     Enlarged prostate     Gout     Hemoptysis     HLD (hyperlipidemia)     Insomnia     Leg weakness     Lung mass     Migraines     Neck pain     Neuropathy     Obesity     PONV (postoperative nausea and vomiting)     Pulmonary embolism     Spinal stenosis     TIA (transient ischemic attack)     Use of cane as ambulatory aid     Walker as ambulation aid         Plan:  Medication Management  Continue lexapro 20mg PO QD  Continue seroquel 300mg PO QHS  Legal  Continue PEC  Disposition  Recommend inpatient psychiatric hospitalization  Psychiatry will continue to follow        [place]

## 2025-06-18 VITALS
HEART RATE: 91 BPM | BODY MASS INDEX: 22.46 KG/M2 | DIASTOLIC BLOOD PRESSURE: 92 MMHG | SYSTOLIC BLOOD PRESSURE: 146 MMHG | OXYGEN SATURATION: 97 % | HEIGHT: 74 IN | RESPIRATION RATE: 18 BRPM | WEIGHT: 175 LBS | TEMPERATURE: 98 F

## 2025-06-18 LAB
ABS NEUT (OLG): 4.96 X10(3)/MCL (ref 2.1–9.2)
ALBUMIN SERPL-MCNC: 3.5 G/DL (ref 3.5–5)
ALBUMIN/GLOB SERPL: 1.1 RATIO (ref 1.1–2)
ALP SERPL-CCNC: 99 UNIT/L (ref 40–150)
ALT SERPL-CCNC: 17 UNIT/L (ref 0–55)
ANA SER QL HEP2 SUBST: NORMAL
ANION GAP SERPL CALC-SCNC: 5 MEQ/L
AST SERPL-CCNC: 15 UNIT/L (ref 11–45)
BASOPHILS NFR BLD MANUAL: 0.35 X10(3)/MCL (ref 0–0.2)
BASOPHILS NFR BLD MANUAL: 4 %
BETA 2 GLYCOPROTEIN AB IGA QUANT (OHS): 1.3 U/ML
BETA 2 GLYCOPROTEIN AB IGG QUANT (OHS): 1.3 U/ML
BETA 2 GLYCOPROTEIN AB IGM QUANT (OHS): 2.5 U/ML
BILIRUB SERPL-MCNC: 0.8 MG/DL
BUN SERPL-MCNC: 8 MG/DL (ref 8.4–25.7)
CALCIUM SERPL-MCNC: 9.2 MG/DL (ref 8.4–10.2)
CARDIOLIPIN IGA QUANT (OHS): 2.3 APL U/ML
CARDIOLIPIN IGG QUANT (OHS): 0.5 GPL U/ML
CARDIOLIPIN IGM QUANT (OHS): 7.6 MPL U/ML
CHLORIDE SERPL-SCNC: 109 MMOL/L (ref 98–107)
CO2 SERPL-SCNC: 30 MMOL/L (ref 22–29)
CREAT SERPL-MCNC: 0.77 MG/DL (ref 0.72–1.25)
CREAT/UREA NIT SERPL: 10
CYCLIC CITRULLINATED PEPTIDE (CCP) (OHS): 0.5 U/ML
EOSINOPHIL NFR BLD MANUAL: 0.09 X10(3)/MCL (ref 0–0.9)
EOSINOPHIL NFR BLD MANUAL: 1 %
ERYTHROCYTE [DISTWIDTH] IN BLOOD BY AUTOMATED COUNT: 16.6 % (ref 11.5–17)
GFR SERPLBLD CREATININE-BSD FMLA CKD-EPI: >60 ML/MIN/1.73/M2
GLOBULIN SER-MCNC: 3.2 GM/DL (ref 2.4–3.5)
GLUCOSE SERPL-MCNC: 79 MG/DL (ref 74–100)
HCT VFR BLD AUTO: 44.8 % (ref 42–52)
HGB BLD-MCNC: 14.5 G/DL (ref 14–18)
INSTRUMENT WBC (OLG): 8.86 X10(3)/MCL
LYMPHOCYTES NFR BLD MANUAL: 3.01 X10(3)/MCL (ref 0.6–4.6)
LYMPHOCYTES NFR BLD MANUAL: 34 %
MAYO GENERIC ORDERABLE RESULT: NORMAL
MCH RBC QN AUTO: 28.6 PG (ref 27–31)
MCHC RBC AUTO-ENTMCNC: 32.4 G/DL (ref 33–36)
MCV RBC AUTO: 88.4 FL (ref 80–94)
MONOCYTES NFR BLD MANUAL: 0.44 X10(3)/MCL (ref 0.1–1.3)
MONOCYTES NFR BLD MANUAL: 5 %
NEUTROPHILS NFR BLD MANUAL: 56 %
PLATELET # BLD AUTO: 200 X10(3)/MCL (ref 130–400)
PLATELET # BLD EST: NORMAL 10*3/UL
PMV BLD AUTO: 9.4 FL (ref 7.4–10.4)
POTASSIUM SERPL-SCNC: 4.2 MMOL/L (ref 3.5–5.1)
PROT SERPL-MCNC: 6.7 GM/DL (ref 6.4–8.3)
RBC # BLD AUTO: 5.07 X10(6)/MCL (ref 4.7–6.1)
RBC MORPH BLD: NORMAL
RHEUMATOID FACTOR IGA QUANTITATIVE (OLG): 4.5 IU/ML
RHEUMATOID FACTOR IGM QUANTITATIVE (OLG): 5.1 IU/ML
SODIUM SERPL-SCNC: 144 MMOL/L (ref 136–145)
WBC # BLD AUTO: 8.86 X10(3)/MCL (ref 4.5–11.5)

## 2025-06-18 PROCEDURE — 25000003 PHARM REV CODE 250: Performed by: STUDENT IN AN ORGANIZED HEALTH CARE EDUCATION/TRAINING PROGRAM

## 2025-06-18 PROCEDURE — 25000003 PHARM REV CODE 250: Performed by: PSYCHIATRY & NEUROLOGY

## 2025-06-18 PROCEDURE — 36415 COLL VENOUS BLD VENIPUNCTURE: CPT | Performed by: STUDENT IN AN ORGANIZED HEALTH CARE EDUCATION/TRAINING PROGRAM

## 2025-06-18 PROCEDURE — 25000003 PHARM REV CODE 250: Performed by: NURSE PRACTITIONER

## 2025-06-18 PROCEDURE — 25000003 PHARM REV CODE 250: Performed by: INTERNAL MEDICINE

## 2025-06-18 PROCEDURE — 85025 COMPLETE CBC W/AUTO DIFF WBC: CPT | Performed by: STUDENT IN AN ORGANIZED HEALTH CARE EDUCATION/TRAINING PROGRAM

## 2025-06-18 PROCEDURE — 80053 COMPREHEN METABOLIC PANEL: CPT | Performed by: STUDENT IN AN ORGANIZED HEALTH CARE EDUCATION/TRAINING PROGRAM

## 2025-06-18 RX ORDER — CARBIDOPA AND LEVODOPA 10; 100 MG/1; MG/1
1 TABLET ORAL 3 TIMES DAILY
Qty: 90 TABLET | Refills: 11 | Status: SHIPPED | OUTPATIENT
Start: 2025-06-18 | End: 2026-06-18

## 2025-06-18 RX ORDER — QUETIAPINE 300 MG/1
300 TABLET, FILM COATED, EXTENDED RELEASE ORAL NIGHTLY
Qty: 30 TABLET | Refills: 2 | Status: SHIPPED | OUTPATIENT
Start: 2025-06-18 | End: 2025-09-16

## 2025-06-18 RX ADMIN — PANTOPRAZOLE SODIUM 40 MG: 40 TABLET, DELAYED RELEASE ORAL at 05:06

## 2025-06-18 RX ADMIN — CARBIDOPA AND LEVODOPA 1 TABLET: 10; 100 TABLET ORAL at 02:06

## 2025-06-18 RX ADMIN — POLYETHYLENE GLYCOL 3350 17 G: 17 POWDER, FOR SOLUTION ORAL at 09:06

## 2025-06-18 RX ADMIN — CARBIDOPA AND LEVODOPA 1 TABLET: 10; 100 TABLET ORAL at 09:06

## 2025-06-18 RX ADMIN — SUCRALFATE 1 G: 1 TABLET ORAL at 09:06

## 2025-06-18 RX ADMIN — ESCITALOPRAM OXALATE 20 MG: 10 TABLET ORAL at 09:06

## 2025-06-18 RX ADMIN — HYDROXYZINE HYDROCHLORIDE 50 MG: 25 TABLET, FILM COATED ORAL at 09:06

## 2025-06-18 RX ADMIN — TAMSULOSIN HYDROCHLORIDE 0.4 MG: 0.4 CAPSULE ORAL at 09:06

## 2025-06-18 RX ADMIN — AMANTADINE HYDROCHLORIDE 200 MG: 100 CAPSULE, LIQUID FILLED ORAL at 09:06

## 2025-06-18 RX ADMIN — SENNOSIDES 8.6 MG: 8.6 TABLET, FILM COATED ORAL at 09:06

## 2025-06-18 RX ADMIN — HYDROCODONE BITARTRATE AND ACETAMINOPHEN 1 TABLET: 10; 325 TABLET ORAL at 09:06

## 2025-06-18 NOTE — DISCHARGE INSTRUCTIONS
Our goal at [facility] is to always give you outstanding care and exceptional service. You may receive a survey from [facility] by mail, text or e-mail in the next 24-48 hours asking about the care you received with us. The survey should only take 5-10 minutes to complete and is very important to us.     Your feedback provides us with a way to recognize our staff who work tirelessly to provide the best care! Also, your responses help us learn how to improve when your experience was below our aspiration of excellence. We are always looking for ways to improve your stay. We WILL use your feedback to continue making improvements to help us provide the highest quality care. We keep your personal information and feedback confidential. We appreciate your time completing this survey and can't wait to hear from you!!!    We look forward to your continued care with us! Thanks so much for choosing [facility] for your healthcare needs!

## 2025-06-18 NOTE — PLAN OF CARE
Problem: Adult Inpatient Plan of Care  Goal: Plan of Care Review  Outcome: Met  Goal: Patient-Specific Goal (Individualized)  Outcome: Met  Goal: Absence of Hospital-Acquired Illness or Injury  Outcome: Met  Goal: Optimal Comfort and Wellbeing  Outcome: Met  Goal: Readiness for Transition of Care  Outcome: Met     Problem: Suicide Risk  Goal: Absence of Self-Harm  Outcome: Met     Problem: Infection  Goal: Absence of Infection Signs and Symptoms  Outcome: Met     Problem: Violence Risk or Actual  Goal: Anger and Impulse Control  Outcome: Met

## 2025-06-18 NOTE — PLAN OF CARE
06/18/25 1318   Final Note   Assessment Type Final Discharge Note   Anticipated Discharge Disposition Home   Hospital Resources/Appts/Education Provided Appointments scheduled and added to AVS   Post-Acute Status   Discharge Delays None known at this time     The patient will be discharged from ([facility]) to his private residence with self-care. There are no (CM) needs at present. The patient and his wife were informed of the above discharge plans and he is in agreement. He will follow-up with his current (Psychiatrist) within 2-4 weeks as per Marco, PMHMP recommendations. Transportation will be arranged by his family's personal vehicle.

## 2025-06-18 NOTE — PROGRESS NOTES
6/18/2025  Aram Thurston   1970   [id number]        Psychiatry Progress Note       SUBJECTIVE:   Aram Thurston is a 54 y.o. male with a past medical history of bipolar disorder, spasticity, left DVT and PE (on Eliquis), cord compression/myelomalacia former longstanding methamphetamine use, tobacco dependence, marijuana use who presents after holding a gun to his head. Patient was PEC'd in the emergency room. Patient states he has been experiencing unintentional weight loss and several months of vomiting, all-over pain and headache that led to him wanting to end it all today.     Seen at the bedside where he is pleasant, polite, and cooperative. Continues to endorse euthymic mood and displays mood-congruent and reactive affect. Reports improved sleep. Denies SI, HI, or auditory/visual hallucinations.     Spoke with patient's wife who reports she has removed all guns and bigger knifes to her fathers house. She feels that patient had a crisis due to not being able to eat or sleep and feels that he is not a threat to himself. Reports he is currently seeing Dr. Fredi Garibay for psychiatry.       Current Medications:   Scheduled Meds:    amantadine HCL  200 mg Oral Daily    carbidopa-levodopa  mg  1 tablet Oral TID    enoxparin  40 mg Subcutaneous Q24H (prophylaxis, 1700)    EScitalopram oxalate  20 mg Oral Daily    hydrOXYzine  50 mg Oral BID    magnesium sulfate 2 g IVPB  2 g Intravenous Once    pantoprazole  40 mg Oral BID AC    polyethylene glycol  17 g Oral Daily    QUEtiapine  300 mg Oral QHS    senna  8.6 mg Oral Daily    sucralfate  1 g Oral BID    tamsulosin  1 capsule Oral Daily      PRN Meds:   Current Facility-Administered Medications:     acetaminophen, 650 mg, Oral, Q4H PRN    albuterol-ipratropium, 3 mL, Nebulization, Q6H PRN    aluminum-magnesium hydroxide-simethicone, 30 mL, Oral, QID PRN    bisacodyL, 10 mg, Rectal, Daily PRN    butalbital-acetaminophen-caffeine -40  mg, 1 tablet, Oral, Q4H PRN    cyclobenzaprine, 10 mg, Oral, TID PRN    dextrose 50%, 12.5 g, Intravenous, PRN    dextrose 50%, 25 g, Intravenous, PRN    glucagon (human recombinant), 1 mg, Intramuscular, PRN    glucose, 16 g, Oral, PRN    glucose, 24 g, Oral, PRN    HYDROcodone-acetaminophen, 1 tablet, Oral, Q6H PRN    melatonin, 9 mg, Oral, Nightly PRN    naloxone, 0.02 mg, Intravenous, PRN    ondansetron, 8 mg, Oral, Q8H PRN    ondansetron, 4 mg, Intravenous, Q8H PRN    simethicone, 1 tablet, Oral, QID PRN    sodium chloride 0.9%, 10 mL, Intravenous, PRN    sumatriptan, 50 mg, Oral, Daily PRN   Psychotherapeutics (From admission, onward)      Start     Stop Route Frequency Ordered    06/16/25 2100  QUEtiapine tablet 300 mg         -- Oral Nightly 06/15/25 1305    06/15/25 0900  EScitalopram oxalate tablet 20 mg         -- Oral Daily 06/14/25 2004            Allergies:   Review of patient's allergies indicates:   Allergen Reactions    Buprenorphine-naloxone     Tylox [oxycodone-acetaminophen]     Iodinated contrast media Nausea Only    Nitrous oxide Nausea And Vomiting        OBJECTIVE:   Vitals   Vitals:    06/18/25 0950   BP: (!) 146/92   Pulse: 91   Resp:    Temp: 98 °F (36.7 °C)        Labs/Imaging/Studies:   Recent Results (from the past 36 hours)   Basic Metabolic Panel    Collection Time: 06/17/25  8:47 AM   Result Value Ref Range    Sodium 143 136 - 145 mmol/L    Potassium 4.5 3.5 - 5.1 mmol/L    Chloride 108 (H) 98 - 107 mmol/L    CO2 28 22 - 29 mmol/L    Glucose 98 74 - 100 mg/dL    Blood Urea Nitrogen 9.8 8.4 - 25.7 mg/dL    Creatinine 0.77 0.72 - 1.25 mg/dL    BUN/Creatinine Ratio 13     Calcium 9.6 8.4 - 10.2 mg/dL    Anion Gap 7.0 mEq/L    eGFR >60 mL/min/1.73/m2   HIV 1/2 Ag/Ab (4th Gen)    Collection Time: 06/17/25  8:47 AM   Result Value Ref Range    HIV Nonreactive Nonreactive   Folate    Collection Time: 06/17/25  8:47 AM   Result Value Ref Range    Folate Level 5.9 (L) 7.0 - 31.4 ng/mL    Vitamin B12    Collection Time: 06/17/25  8:47 AM   Result Value Ref Range    Vitamin B12 781 213 - 816 pg/mL   Hepatitis Panel, Acute    Collection Time: 06/17/25  8:47 AM   Result Value Ref Range    Hep A IgM Interp Nonreactive Nonreactive    Hep B Core IgM Interp Nonreactive Nonreactive    Hep BsAg Interp Nonreactive Nonreactive    Hep C Ab Interp Nonreactive Nonreactive   Hepatitis B Core Antibody, Total    Collection Time: 06/17/25  8:47 AM   Result Value Ref Range    Hep BcAb Interp Nonreactive Nonreactive   SYPHILIS ANTIBODY (WITH REFLEX RPR)    Collection Time: 06/17/25  8:47 AM   Result Value Ref Range    Syphilis Antibody Nonreactive Nonreactive, Equivocal   CBC with Differential    Collection Time: 06/17/25  8:47 AM   Result Value Ref Range    WBC 8.44 4.50 - 11.50 x10(3)/mcL    RBC 5.46 4.70 - 6.10 x10(6)/mcL    Hgb 15.5 14.0 - 18.0 g/dL    Hct 48.2 42.0 - 52.0 %    MCV 88.3 80.0 - 94.0 fL    MCH 28.4 27.0 - 31.0 pg    MCHC 32.2 (L) 33.0 - 36.0 g/dL    RDW 16.4 11.5 - 17.0 %    Platelet 226 130 - 400 x10(3)/mcL    MPV 9.2 7.4 - 10.4 fL    Neut % 62.4 %    Lymph % 27.0 %    Mono % 5.7 %    Eos % 2.6 %    Basophil % 1.9 %    Imm Grans % 0.4 %    Neut # 5.27 2.1 - 9.2 x10(3)/mcL    Lymph # 2.28 0.6 - 4.6 x10(3)/mcL    Mono # 0.48 0.1 - 1.3 x10(3)/mcL    Eos # 0.22 0 - 0.9 x10(3)/mcL    Baso # 0.16 <=0.2 x10(3)/mcL    Imm Gran # 0.03 0.00 - 0.04 x10(3)/mcL    NRBC% 0.0 %   Pathologist Interpretation    Collection Time: 06/17/25  8:47 AM   Result Value Ref Range    Pathology Review       No serological evidence of recent or past hepatitis A, B, or C infection.    Jake Walton M.D.    Comprehensive Metabolic Panel    Collection Time: 06/18/25  5:42 AM   Result Value Ref Range    Sodium 144 136 - 145 mmol/L    Potassium 4.2 3.5 - 5.1 mmol/L    Chloride 109 (H) 98 - 107 mmol/L    CO2 30 (H) 22 - 29 mmol/L    Glucose 79 74 - 100 mg/dL    Blood Urea Nitrogen 8.0 (L) 8.4 - 25.7 mg/dL    Creatinine 0.77 0.72  - 1.25 mg/dL    Calcium 9.2 8.4 - 10.2 mg/dL    Protein Total 6.7 6.4 - 8.3 gm/dL    Albumin 3.5 3.5 - 5.0 g/dL    Globulin 3.2 2.4 - 3.5 gm/dL    Albumin/Globulin Ratio 1.1 1.1 - 2.0 ratio    Bilirubin Total 0.8 <=1.5 mg/dL    ALP 99 40 - 150 unit/L    ALT 17 0 - 55 unit/L    AST 15 11 - 45 unit/L    eGFR >60 mL/min/1.73/m2    Anion Gap 5.0 mEq/L    BUN/Creatinine Ratio 10    CBC with Differential    Collection Time: 06/18/25  5:42 AM   Result Value Ref Range    WBC 8.86 4.50 - 11.50 x10(3)/mcL    RBC 5.07 4.70 - 6.10 x10(6)/mcL    Hgb 14.5 14.0 - 18.0 g/dL    Hct 44.8 42.0 - 52.0 %    MCV 88.4 80.0 - 94.0 fL    MCH 28.6 27.0 - 31.0 pg    MCHC 32.4 (L) 33.0 - 36.0 g/dL    RDW 16.6 11.5 - 17.0 %    Platelet 200 130 - 400 x10(3)/mcL    MPV 9.4 7.4 - 10.4 fL   Manual Differential    Collection Time: 06/18/25  5:42 AM   Result Value Ref Range    WBC 8.86 x10(3)/mcL    Neutrophils % 56 %    Lymphs % 34 %    Monocytes % 5 %    Eosinophils % 1 %    Basophils % 4 %    Neutrophils Abs 4.9616 2.1 - 9.2 x10(3)/mcL    Lymphs Abs 3.0124 0.6 - 4.6 x10(3)/mcL    Monocytes Abs 0.443 0.1 - 1.3 x10(3)/mcL    Eosinophils Abs 0.0886 0 - 0.9 x10(3)/mcL    Basophils Abs 0.3544 (H) 0 - 0.2 x10(3)/mcL    Platelets Normal Normal, Adequate    RBC Morph Normal Normal        Psychiatric Mental Status Exam:  General Appearance: appears stated age, dressed in hospital garb, lying in bed, in no acute distress  Arousal: alert  Behavior: cooperative, pleasant, polite, appropriate eye-contact, under good behavioral control  Movements and Motor Activity: no tics, no tremors, no akathisia, no dystonia, no evidence of tardive dyskinesia  Orientation: oriented to person, place, time, and situation  Speech: normal rate, rhythm, volume, tone and pitch  Mood: Euthymic  Affect: normal, euthymic, reactive, full-range, mood-congruent, appropriate to situation and context  Thought Process: intact, linear, goal-directed, organized, logical  Associations:  intact, no loosening of associations  Thought Content and Perceptions: no suicidal or homicidal ideation, no auditory or visual hallucinations, no paranoid ideation, no ideas of reference, no evidence of delusions or psychosis  Recent and Remote Memory: grossly intact; per interview/observation with patient  Attention and Concentration: grossly intact; per interview/observation with patient  Fund of Knowledge: grossly intact; based on history, vocabulary, fund of knowledge, syntax, grammar, and content  Insight: adequate; based on understanding of severity of illness and HPI  Judgment: questionable; based on patient's behavior and HPI    ASSESSMENT/PLAN:   Problems Addressed/Diagnoses:  Major Depressive Disorder, Recurrent, Severe without psychosis (F33.2)     Risk Assessment  I preformed a through risk assessment to evaluate safety for discharge. Mr. Thurston has several risk factors for violence towards himself. Notably, he is >0 ears old, male, has at least one prior suicide attempt, has a mood disorder, and suffers from a chronic medical illness.  He also has a number of protective factors. Notably, he is currently in a stable relationship, denies feeling hopeless, is not psychotic, is not endorsing any auditory or visual hallucinations, is not observed to be responding to internal stimuli, has no cognitive dysfunction, denies current suicidal ideations, denies current homicidal ideation, has established relationship with medical professions, and has no access to guns or weapons.   Collateral information was obtained from his wife, Vikki Thurston, and is reassuring with his wife stating they have low concern that patient will harm himself.   During my evaluation today he presents with polite and pleasant behavior. He endorses a euthymic mood and denies feeling depressed or anxious. His affect is appropriate. He displays no evidence of psychosis or vee.Goal-directed thought process without delusional content  voiced. Denies hallucinations and not observed to be responding to internal stimuli.  Taking into account all of the above information his overall chronic risk for future dangerousness to self is moderate. His imminent risk is low as he now denies SI/I, has no intent or plan, is clinically sober, adherent with medication, and has outpatient psychiatric follow-up.   During admission we have increased quetiapine and he has seen improvement in sleep. His parkinson's has been addressed by Neurology and he reports improvement in these symptoms as well.   At this time as he is not imminently at risk for suicide we will lift PEC.     Past Medical History:   Diagnosis Date    Anxiety     Arthritis     Asthma     Back pain     Balance problems     Cervical herniated disc     Depression     Diabetes mellitus     Dry cough     DVT (deep venous thrombosis)     Enlarged prostate     Gout     Hemoptysis     HLD (hyperlipidemia)     Insomnia     Leg weakness     Lung mass     Migraines     Neck pain     Neuropathy     Obesity     PONV (postoperative nausea and vomiting)     Pulmonary embolism     Spinal stenosis     TIA (transient ischemic attack)     Use of cane as ambulatory aid     Walker as ambulation aid         Plan:  Medication Management  Lexapro 20mg PO QD  Quetiapine 300mg PO QHS  Legal  Will discontinue PEC  Follow-up  Recommend follow-up with his current psychiatrist within 2-4 weeks.  Psychiatry will sign-off        Black Campo

## 2025-06-18 NOTE — DISCHARGE SUMMARY
[facility]  Hospital Medicine Discharge Summary    Admit Date: 6/14/2025  Discharge Date and Time: 6/18/202512:27 PM  Admitting Physician:  Team  Discharging Physician: Valeria Ponce MD.  Primary Care Physician: Elvi Burnett MD  Consults: Neurology and Psychiatry    Discharge Diagnoses:  Drug induced parkinsonism, weight loss due to nausea and vomiting, bipolar disorder, history of DVT and PE, spasticity and cord compression/myelomalacia headache, tobacco dependence, marijuana use, former meth user    Hospital Course:   54-year-old male with a past medical history of bipolar disorder, spasticity, left DVT and PE (on Eliquis), cord compression/myelomalacia former longstanding methamphetamine use, tobacco dependence, marijuana use who presents after holding a gun to his head.  Patient was PEC'd in the emergency room.  Patient states he has been experiencing unintentional weight loss and several months of vomiting, all-over pain and headache that led to him wanting to end it all today. Denies HI     Pain management Dr. Sheehan in [place].  Psychiatry Dr. Garibay.   Neurology: Dr. Ivelisse Siddiqui     At baseline the patient lives with his wife and ambulates with a cane.     6/15: NSG has signed off as patient is being worked up outpatient by NS. Neurology consulted as patient claims to have Parkinson's however there is no medications on file; possibly this is Parkinson secondary to chronic meth abuse.  Nausea and vomiting may resolve if started on appropriate medications.  Additionally we will consult GI as patient has chronic N/V with alarming symptoms of severe weight loss.  Psych following for suicidal ideation.     6/16:Tremors have improved after neurology has started carbidopa/levodopa.  Patient's nausea and vomiting has improved however he does mention that it is hard for him to swallow medications and solids.  Due to his history of requiring esophageal dilation, we  will await for GI eval. Psych following.  PEC still in place.     6/17: Patient was seen and evaluate at bedside, oxygenating well on RA.  No events overnight.  Currently on carbidopa/levodopa.  GI has evaluated the patient and recommends esophagram and follow up with outpatient GI.  Psych following.  PEC still in place.  Psychiatry recommends holding patient for now and possible inpatient admission to psychiatric facility at discharge.     Today, 6/18, patient was seen and evaluated at bedside, oxygenating well on room air.  Psychiatry has removed pec as they have spoken with patient's wife who has mentioned that she has removed all guns and knives from the house.  Additionally patient mentions he has not suicidal anymore and that he was very desperate as his condition was deteriorating and he was not receiving the help he needed.  Currently patient is tremors and mobility have significantly improved with the use of Sinemet.  Patient has verbalizes understanding of this medication and the need for it moving forward for the rest of his life.  Nausea and vomiting have significantly improved as well after starting Sinemet.  Patient will continue to follow up with Neurosurgery for his outpatient workup.  Additionally patient will also seen neurology after discharge.  Currently tolerating diet very well and was walking around room at the time my arrival.  Patient has verbalized his understanding of this hospital stay and is safe for discharge at this time.  Follow up as recommended.  Pt was seen and examined on the day of discharge  Vitals:  VITAL SIGNS: 24 HRS MIN & MAX LAST   Temp  Min: 97.5 °F (36.4 °C)  Max: 98.5 °F (36.9 °C) 98 °F (36.7 °C)   BP  Min: 99/58  Max: 146/92 (!) 146/92   Pulse  Min: 52  Max: 91  91   Resp  Min: 18  Max: 18 18   SpO2  Min: 97 %  Max: 99 % 97 %       Physical Exam:  General: In no acute distress, afebrile  Chest: Clear to auscultation bilaterally  Heart: RRR, +S1, S2, no appreciable  murmur  Abdomen: Soft, nontender, BS +  MSK: Warm, no lower extremity edema, no clubbing or cyanosis  Neurologic: Alert and oriented x4, Cranial nerve II-XII intact, Strength 5/5 in all 4 extremities    Procedures Performed: No admission procedures for hospital encounter.     Significant Diagnostic Studies: See Full reports for all details    Recent Labs   Lab 06/16/25  0644 06/17/25  0847 06/18/25  0542   WBC 6.26  6.26 8.44 8.86  8.86   RBC 5.18 5.46 5.07   HGB 14.8 15.5 14.5   HCT 45.6 48.2 44.8   MCV 88.0 88.3 88.4   MCH 28.6 28.4 28.6   MCHC 32.5* 32.2* 32.4*   RDW 16.3 16.4 16.6    226 200   MPV 9.3 9.2 9.4       Recent Labs   Lab 06/14/25  1647 06/15/25  0627 06/16/25  0644 06/17/25  0847 06/18/25  0542    140 143 143 144   K 3.5 5.1 4.7 4.5 4.2    107 109* 108* 109*   CO2 23 24 25 28 30*   BUN 12.0 12.4 10.5 9.8 8.0*   CREATININE 0.93 0.88 0.78 0.77 0.77   * 106* 93 98 79   CALCIUM 9.7 9.5 9.2 9.6 9.2   MG 1.70  --  1.90  --   --    ALBUMIN 4.2 4.0  --   --  3.5   PROT 8.4* 7.8  --   --  6.7   ALKPHOS 159* 142  --   --  99   ALT 29 27  --   --  17   AST 16 16  --   --  15   BILITOT 1.4 1.5  --   --  0.8        Microbiology Results (last 7 days)       Procedure Component Value Units Date/Time    Stool Culture [[id number]]     Order Status: Sent Specimen: Stool     Clostridium Diff Toxin, A & B, EIA [[id number]]     Order Status: Canceled Specimen: Stool              US Abdomen Limited  Narrative: EXAMINATION:  US ABDOMEN LIMITED    CLINICAL HISTORY:  Elevated liver function test    TECHNIQUE:  Multiple real-time transverse and longitudinal sections were performed of the right abdomen by the sonographer. Select images were submitted for review.    COMPARISON:  CT abdomen pelvis July 31, 2023.    FINDINGS:  Liver is of unremarkable echotexture with normal contour and size. There was no delineation of discrete hepatic cystic or solid mass. Hepatic maximum diameter of 15.3 cm. There  was unremarkable hepatopedal flow within the portal vein.  Pancreas obscured by overlying bowel gas.    Gallbladder lumen contains several echogenic foci consistent with cholelithiasis.  Largest calculus measures 2.0 x 1.2 cm.  Gallbladder wall thickness is within normal limits. Common bile duct caliber of 3 point mm is within normal limits for the age. Sonographer reported negative Lake's sign.    Inferior vena cava and abdominal aorta are not visualized due to overlying bowel gas    Normally located right kidney length measures 9.8 x 5.8 x 5.0 cm. Right renal corticomedullary differentiation is unremarkable. No evidence of hydronephrosis.  Impression: 1.  Multiple gallstones without sonographic findings of acute cholecystitis.    2.  Details of other findings above.    Electronically signed by: Omari Mcclain  Date:    06/14/2025  Time:    21:18  X-Ray Chest 1 View  Narrative: EXAMINATION:  XR CHEST 1 VIEW    CLINICAL HISTORY:  Chest pain, unspecified    TECHNIQUE:  One view    COMPARISON:  None available.    FINDINGS:  Cardiopericardial silhouette is within normal limits.  No acute dense focal or segmental consolidation, congestive process, pleural effusions or pneumothorax.  Partially visualized cervicothoracic fusions.  Impression: No acute cardiopulmonary process identified.    Electronically signed by: Omari Mcclain  Date:    06/14/2025  Time:    17:04         Medication List        START taking these medications      carbidopa-levodopa  mg  mg per tablet  Commonly known as: SINEMET  Take 1 tablet by mouth 3 (three) times daily.            CHANGE how you take these medications      QUEtiapine 300 MG Tb24  Commonly known as: SEROQUEL XR  Take 1 tablet (300 mg total) by mouth every evening.  What changed:   medication strength  how much to take  when to take this            CONTINUE taking these medications      amantadine  mg capsule  Commonly known as: SYMMETREL     cyclobenzaprine 10 MG  tablet  Commonly known as: FLEXERIL     ELIQUIS 5 mg Tab  Generic drug: apixaban     EScitalopram oxalate 20 MG tablet  Commonly known as: LEXAPRO     HYDROcodone-acetaminophen  mg per tablet  Commonly known as: NORCO     hydrOXYzine 50 MG tablet  Commonly known as: ATARAX     ondansetron 4 MG Tbdl  Commonly known as: ZOFRAN-ODT     sucralfate 1 gram tablet  Commonly known as: CARAFATE     sumatriptan 50 MG tablet  Commonly known as: IMITREX     tamsulosin 0.4 mg Cap  Commonly known as: FLOMAX     temazepam 30 mg capsule  Commonly known as: RESTORIL     tiZANidine 4 MG tablet  Commonly known as: ZANAFLEX     venlafaxine 150 MG Cp24  Commonly known as: EFFEXOR-XR               Where to Get Your Medications        These medications were sent to [facility] - [facility] Lawler - [address]  [address] [place] [address]      Phone: [phone]   carbidopa-levodopa  mg  mg per tablet  QUEtiapine 300 MG Tb24          Explained in detail to the patient about the discharge plan, medications, and follow-up visits. Pt understands and agrees with the treatment plan  Discharge Disposition: Home or Self Care   Discharged Condition: stable  Diet-   Dietary Orders (From admission, onward)       Start     Ordered    06/16/25 1452  Dietary nutrition supplements All Meals; Boost Very High Calorie Nutritional Drink - Vanilla  Continuous        Question Answer Comment   Frequency: All Meals    Select PO Supplement: Boost Very High Calorie Nutritional Drink - Vanilla        06/16/25 1451    06/15/25 1051  Diet Adult Regular Safety Tray  Diet effective now        Question:  Tray type:  Answer:  Safety Tray    06/15/25 1050                   Medications Per DC med rec  Activities as tolerated   Follow-up Information       Patrick Pereyra MD Follow up in 1 month(s).    Specialties: Neurology, Sleep Medicine  Why: parkinson's disease management  Contact information:  [address] [place] [address] [address]  [place] [address]  [phone]                           For further questions contact hospitalist office    Discharge time 33 minutes    For worsening symptoms, chest pain, shortness of breath, increased abdominal pain, high grade fever, stroke or stroke like symptoms, immediately go to the nearest Emergency Room or call 911 as soon as possible.      Valeria Maurice M.D, on 6/18/2025. at 12:27 PM.

## 2025-06-19 LAB
C-ANCA TITR SER IF: NEGATIVE {TITER}
C3 SERPL-MCNC: 133 MG/DL
C4 SERPL-MCNC: 26 MG/DL
NUCLEAR IGG SER QL IA: NORMAL
P-ANCA SER QL IF: NEGATIVE
RHEUMATOID FACT SERPL-ACNC: 11 IU/ML

## 2025-06-20 ENCOUNTER — PATIENT OUTREACH (OUTPATIENT)
Dept: ADMINISTRATIVE | Facility: CLINIC | Age: 55
End: 2025-06-20
Payer: MEDICARE

## 2025-06-20 NOTE — PROGRESS NOTES
C3 nurse attempted to contact Aram Thurston for a [facility] hospital discharge follow up call.  No answer. LVM.  Non [facility] PCP.

## 2025-06-20 NOTE — PROGRESS NOTES
2nd attempt-C3 nurse attempted to contact Aram Thurston for a [facility] hospital discharge follow up call.  No answer. LVM.  Non [facility] PCP.

## 2025-06-24 ENCOUNTER — TELEPHONE (OUTPATIENT)
Dept: ADMINISTRATIVE | Facility: CLINIC | Age: 55
End: 2025-06-24
Payer: MEDICARE

## 2025-06-26 ENCOUNTER — PATIENT OUTREACH (OUTPATIENT)
Dept: ADMINISTRATIVE | Facility: OTHER | Age: 55
End: 2025-06-26
Payer: MEDICARE

## 2025-06-26 ENCOUNTER — TELEPHONE (OUTPATIENT)
Dept: ADMINISTRATIVE | Facility: CLINIC | Age: 55
End: 2025-06-26
Payer: MEDICARE

## 2025-06-26 NOTE — PROGRESS NOTES
CHW - Initial Contact    This Community Health Worker completed OR updated the Social Determinant of Health questionnaire with patient via telephone today.    Pt identified barriers of most importance are: Pt states he is out of atarax and needs a new prescription, States he has called his doctor's office for a refill. I see Suzan Reyna, pharmacist has an open note on him. I told pt there is nothing that can be done until the doctor sends the prescription to a pharmacy. Will let Suzan continue her encounter with him.   Referrals to community agencies completed with patient/caregiver consent outside of [facility] include: no  Referrals were put through [facility] - no:   Support and Services: Advice Only  Other information discussed the patient needs / wants help with: medication refill   Follow up required: no  No future outreach task assigned

## 2025-06-26 NOTE — PROGRESS NOTES
Pt states he is out of atarax and needs a new prescription. States he has called his doctor's office for a refill. I see Suzan Reyna, pharmacist has an open note on him. I told pt there is nothing that can be done until the doctor sends the prescription to a pharmacy. Will let Suzan continue her encounter with him.

## 2025-07-08 ENCOUNTER — LAB VISIT (OUTPATIENT)
Dept: LAB | Facility: HOSPITAL | Age: 55
End: 2025-07-08
Payer: MEDICARE

## 2025-07-08 ENCOUNTER — OFFICE VISIT (OUTPATIENT)
Dept: INFECTIOUS DISEASES | Facility: CLINIC | Age: 55
End: 2025-07-08
Payer: MEDICARE

## 2025-07-08 VITALS
BODY MASS INDEX: 22.97 KG/M2 | WEIGHT: 179 LBS | RESPIRATION RATE: 18 BRPM | SYSTOLIC BLOOD PRESSURE: 105 MMHG | HEIGHT: 74 IN | OXYGEN SATURATION: 99 % | HEART RATE: 73 BPM | DIASTOLIC BLOOD PRESSURE: 72 MMHG

## 2025-07-08 DIAGNOSIS — R63.4 WEIGHT LOSS, UNINTENTIONAL: ICD-10-CM

## 2025-07-08 DIAGNOSIS — R63.4 WEIGHT LOSS, UNINTENTIONAL: Primary | ICD-10-CM

## 2025-07-08 LAB
ALBUMIN SERPL-MCNC: 3.8 G/DL (ref 3.5–5)
ALBUMIN/GLOB SERPL: 1.2 RATIO (ref 1.1–2)
ALP SERPL-CCNC: 129 UNIT/L (ref 40–150)
ALT SERPL-CCNC: 5 UNIT/L (ref 0–55)
ANION GAP SERPL CALC-SCNC: 11 MEQ/L
AST SERPL-CCNC: 11 UNIT/L (ref 11–45)
BACTERIA #/AREA URNS AUTO: ABNORMAL /HPF
BASOPHILS # BLD AUTO: 0.16 X10(3)/MCL
BASOPHILS NFR BLD AUTO: 1 %
BILIRUB DIRECT SERPL-MCNC: 0.2 MG/DL (ref 0–?)
BILIRUB SERPL-MCNC: 0.9 MG/DL
BILIRUB UR QL STRIP.AUTO: NEGATIVE
BUN SERPL-MCNC: 10.5 MG/DL (ref 8.4–25.7)
CALCIUM SERPL-MCNC: 9.3 MG/DL (ref 8.4–10.2)
CHLORIDE SERPL-SCNC: 104 MMOL/L (ref 98–107)
CLARITY UR: CLEAR
CO2 SERPL-SCNC: 25 MMOL/L (ref 22–29)
COLOR UR AUTO: YELLOW
CREAT SERPL-MCNC: 0.96 MG/DL (ref 0.72–1.25)
CREAT/UREA NIT SERPL: 11
CRP SERPL-MCNC: <1 MG/L
EOSINOPHIL # BLD AUTO: 0.06 X10(3)/MCL (ref 0–0.9)
EOSINOPHIL NFR BLD AUTO: 0.4 %
ERYTHROCYTE [DISTWIDTH] IN BLOOD BY AUTOMATED COUNT: 15.8 % (ref 11.5–17)
ERYTHROCYTE [SEDIMENTATION RATE] IN BLOOD: 3 MM/HR (ref 0–20)
GFR SERPLBLD CREATININE-BSD FMLA CKD-EPI: >60 ML/MIN/1.73/M2
GLOBULIN SER-MCNC: 3.3 GM/DL (ref 2.4–3.5)
GLUCOSE SERPL-MCNC: 113 MG/DL (ref 74–100)
GLUCOSE UR QL STRIP: NORMAL
HCT VFR BLD AUTO: 50.3 % (ref 42–52)
HGB BLD-MCNC: 16.4 G/DL (ref 14–18)
HGB UR QL STRIP: NEGATIVE
HYALINE CASTS #/AREA URNS LPF: ABNORMAL /LPF
IMM GRANULOCYTES # BLD AUTO: 0.09 X10(3)/MCL (ref 0–0.04)
IMM GRANULOCYTES NFR BLD AUTO: 0.6 %
KETONES UR QL STRIP: ABNORMAL
LEUKOCYTE ESTERASE UR QL STRIP: 75
LYMPHOCYTES # BLD AUTO: 1.74 X10(3)/MCL (ref 0.6–4.6)
LYMPHOCYTES NFR BLD AUTO: 11.3 %
MCH RBC QN AUTO: 29 PG (ref 27–31)
MCHC RBC AUTO-ENTMCNC: 32.6 G/DL (ref 33–36)
MCV RBC AUTO: 89 FL (ref 80–94)
MONOCYTES # BLD AUTO: 0.69 X10(3)/MCL (ref 0.1–1.3)
MONOCYTES NFR BLD AUTO: 4.5 %
MUCOUS THREADS URNS QL MICRO: ABNORMAL /LPF
NEUTROPHILS # BLD AUTO: 12.67 X10(3)/MCL (ref 2.1–9.2)
NEUTROPHILS NFR BLD AUTO: 82.2 %
NITRITE UR QL STRIP: NEGATIVE
NRBC BLD AUTO-RTO: 0 %
PH UR STRIP: 6.5 [PH]
PLATELET # BLD AUTO: 282 X10(3)/MCL (ref 130–400)
PMV BLD AUTO: 8.6 FL (ref 7.4–10.4)
POTASSIUM SERPL-SCNC: 4.4 MMOL/L (ref 3.5–5.1)
PROT SERPL-MCNC: 7.1 GM/DL (ref 6.4–8.3)
PROT UR QL STRIP: ABNORMAL
RBC # BLD AUTO: 5.65 X10(6)/MCL (ref 4.7–6.1)
RBC #/AREA URNS AUTO: ABNORMAL /HPF
SODIUM SERPL-SCNC: 140 MMOL/L (ref 136–145)
SP GR UR STRIP.AUTO: 1.02 (ref 1–1.03)
SQUAMOUS #/AREA URNS LPF: ABNORMAL /HPF
T3 SERPL-MCNC: 64.23 NG/DL (ref 60–180)
T4 SERPL-MCNC: 4.19 UG/DL (ref 4.87–11.72)
TSH SERPL-ACNC: 1.53 UIU/ML (ref 0.35–4.94)
UROBILINOGEN UR STRIP-ACNC: 4
WBC # BLD AUTO: 15.41 X10(3)/MCL (ref 4.5–11.5)
WBC #/AREA URNS AUTO: ABNORMAL /HPF

## 2025-07-08 PROCEDURE — 99214 OFFICE O/P EST MOD 30 MIN: CPT | Mod: PBBFAC

## 2025-07-08 PROCEDURE — 82248 BILIRUBIN DIRECT: CPT

## 2025-07-08 PROCEDURE — 36415 COLL VENOUS BLD VENIPUNCTURE: CPT

## 2025-07-08 PROCEDURE — 86480 TB TEST CELL IMMUN MEASURE: CPT

## 2025-07-08 PROCEDURE — 85025 COMPLETE CBC W/AUTO DIFF WBC: CPT

## 2025-07-08 PROCEDURE — 84436 ASSAY OF TOTAL THYROXINE: CPT

## 2025-07-08 PROCEDURE — 86140 C-REACTIVE PROTEIN: CPT

## 2025-07-08 PROCEDURE — 80053 COMPREHEN METABOLIC PANEL: CPT

## 2025-07-08 PROCEDURE — 81001 URINALYSIS AUTO W/SCOPE: CPT

## 2025-07-08 PROCEDURE — 87389 HIV-1 AG W/HIV-1&-2 AB AG IA: CPT

## 2025-07-08 PROCEDURE — 99999 PR PBB SHADOW E&M-EST. PATIENT-LVL IV: CPT | Mod: PBBFAC,,,

## 2025-07-08 PROCEDURE — 99205 OFFICE O/P NEW HI 60 MIN: CPT | Mod: S$PBB,,,

## 2025-07-08 PROCEDURE — 84443 ASSAY THYROID STIM HORMONE: CPT

## 2025-07-08 PROCEDURE — 85652 RBC SED RATE AUTOMATED: CPT

## 2025-07-08 PROCEDURE — 84480 ASSAY TRIIODOTHYRONINE (T3): CPT

## 2025-07-08 RX ORDER — OXYCODONE AND ACETAMINOPHEN 10; 325 MG/1; MG/1
1 TABLET ORAL EVERY 6 HOURS PRN
COMMUNITY

## 2025-07-08 RX ORDER — METFORMIN HYDROCHLORIDE 1000 MG/1
TABLET ORAL
COMMUNITY

## 2025-07-08 RX ORDER — ATORVASTATIN CALCIUM 10 MG/1
TABLET, FILM COATED ORAL
COMMUNITY

## 2025-07-08 NOTE — PROGRESS NOTES
[facility]    Subjective:       Patient ID: Aram Thurston 54 y.o.     Chief Complaint:   Chief Complaint   Patient presents with    NEW PT/ UNINTENTIONAL WEIGHT LOSS     Recently diagnoses with Parkinson         HPI:  7/8/2025 Office Visit:  Mr. Shoemaker is here today to discuss a significant weight loss over the last year or so.  He states that he was initially weighing well over 300 lbs and had significant strength required to lift heavy objects and perform all ADLs and job requirements but over the last year his weight has dropped to around 160-1670 lbs and his strength has significantly reduced.  He was recently diagnosed with Parkinson's Disease and is currently receiving treatment which has helped with his mobility, coordination, etc.  He was recently discharged from the hospital on 6/18 here at [facility].  At that time he was diagnosed with drug induced parkinsonism, bipolar disorder, and weight loss due to nausea and vomiting.  During this visit he was seen by psychiatric team for threatening suicide (reportedly with a gun to his head at home per him and his wife) because he felt his condition was deteriorating rapidly and he felt as if no one was helping him.      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  He has lived all of his life in [place], mostly in [place] and [place].  He denies any international travel and only travels domestically on occasion for vacations and such to the beaches in [place].  In the past he raised cattle, ducks, chickens, and hogs.  He has had TB PPD  in the past but all have been negative.  He has had some difficulty swallowing and was evaluated by GI.  He states he has had esophageal dilation in the past and that it only lasts for a short time before dysphagia returns.  He has a chronic cough that has persisted for 15-20 years that is mostly dry but at times he does have some either brownish or yellow/green sputum production.  He does report that at times his sputum has been blood tinged.  He has a cyst-like nodule along the L flank just above the L hip that is mildly red, slightly tender to touch.  He states this developed first and has increased slightly in size.  He complains of a similar cyst-like nodule in the similar spot on the R flank but this is only palpable and not visualized.  He had a cervical and lumbar fusion about 18 months ago and is planned for another surgery in the thoracic spine soon.        Past Medical History:   Diagnosis Date    Anxiety     Arthritis     Asthma     Back pain     Balance problems     Cervical herniated disc     Depression     Diabetes mellitus     Dry cough     DVT (deep venous thrombosis)     Enlarged prostate     Gout     Hemoptysis     HLD (hyperlipidemia)     Insomnia     Leg weakness     Lung mass     Migraines     Neck pain     Neuropathy     Obesity     PONV (postoperative nausea and vomiting)     Pulmonary embolism     Spinal stenosis     TIA (transient ischemic attack)     Use of cane as ambulatory aid     Walker as ambulation aid         Past Surgical History:   Procedure Laterality Date    ABCESS DRAINAGE      scrotal cyst    BRONCHIAL IRRIGATION  11/1/2023    Procedure: IRRIGATION, BRONCHUS;  Surgeon: ERIS George MD;  Location: [facility] BRONCHOSCOPY LAB;  Service: Pulmonary;;  RLL    CERVICAL FUSION      COLONOSCOPY W/ POLYPECTOMY  10/2023    FLEXIBLE BRONCHOSCOPY N/A 11/1/2023    Procedure: BRONCHOSCOPY, FIBEROPTIC;  Surgeon: ERIS George MD;  Location: [facility] BRONCHOSCOPY LAB;  Service: Pulmonary;   "Laterality: N/A;    LUMBAR FUSION      NASAL POLYP EXCISION      PARTIAL KNEE ARTHROPLASTY Left     ROTATOR CUFF REPAIR Right     TYMPANOPLASTY Right         Social History     Socioeconomic History    Marital status:    Tobacco Use    Smoking status: Every Day     Current packs/day: 0.25     Average packs/day: 0.5 packs/day for 45.5 years (22.2 ttl pk-yrs)     Types: Cigarettes     Start date: 1980    Smokeless tobacco: Never   Substance and Sexual Activity    Alcohol use: Never    Drug use: Yes     Comment: "THC oil"     Social Drivers of Health     Financial Resource Strain: Low Risk  (6/16/2025)    Overall Financial Resource Strain (CARDIA)     Difficulty of Paying Living Expenses: Not very hard   Food Insecurity: No Food Insecurity (6/16/2025)    Hunger Vital Sign     Worried About Running Out of Food in the Last Year: Never true     Ran Out of Food in the Last Year: Never true   Transportation Needs: No Transportation Needs (6/16/2025)    PRAPARE - Transportation     Lack of Transportation (Medical): No     Lack of Transportation (Non-Medical): No   Stress: No Stress Concern Present (6/16/2025)    [employer] - Occupational Stress Questionnaire     Feeling of Stress : Only a little   Housing Stability: Low Risk  (6/16/2025)    Housing Stability Vital Sign     Unable to Pay for Housing in the Last Year: No     Homeless in the Last Year: No        Family History   Adopted: Yes   Problem Relation Name Age of Onset    Seizures Mother      Heart failure Father          Review of patient's allergies indicates:   Allergen Reactions    Buprenorphine-naloxone     Tylox [oxycodone-acetaminophen]     Iodinated contrast media Nausea Only    Nitrous oxide Nausea And Vomiting        Immunization History   Administered Date(s) Administered    COVID-19, MRNA, LN-S, PF (MODERNA FULL 0.5 ML DOSE) 08/23/2021, 09/21/2021    Influenza - Quadrivalent - PF *Preferred* (6 months and older) " "09/10/2020        Review of Systems   All other systems reviewed and are negative.         Objective:      /72 (BP Location: Left arm, Patient Position: Sitting)   Pulse 73   Resp 18   Ht 6' 2" (1.88 m)   Wt 81.2 kg (179 lb)   SpO2 99%   BMI 22.98 kg/m²      Physical Exam  Vitals reviewed.   Constitutional:       Appearance: Normal appearance.      Comments: Utilizes cane for mobility   HENT:      Head: Normocephalic and atraumatic.      Mouth/Throat:      Mouth: Mucous membranes are moist.   Eyes:      Extraocular Movements: Extraocular movements intact.      Pupils: Pupils are equal, round, and reactive to light.   Cardiovascular:      Rate and Rhythm: Normal rate and regular rhythm.      Pulses: Normal pulses.      Heart sounds: Normal heart sounds.   Pulmonary:      Effort: Pulmonary effort is normal.      Breath sounds: Normal breath sounds.   Abdominal:      General: Abdomen is flat.      Palpations: Abdomen is soft.   Musculoskeletal:         General: Normal range of motion.      Cervical back: Neck supple.   Skin:     General: Skin is warm and dry.   Neurological:      Mental Status: He is alert and oriented to person, place, and time.      Motor: Weakness present.      Coordination: Coordination abnormal.      Gait: Gait abnormal.      Comments: Parkinson's disorder   Psychiatric:         Mood and Affect: Mood normal.         Behavior: Behavior normal.          Labs: Reviewed most recent relevant labs available, notable results highlighted in this note    Imaging: Reviewed most recent relevant imaging studies available, notable results highlighted in this note    Assessment:       Problem List Items Addressed This Visit    None  Visit Diagnoses         Weight loss, unintentional    -  Primary    Relevant Orders    CBC Auto Differential (Completed)    Comprehensive Metabolic Panel (Completed)    C-Reactive Protein (Completed)    Sedimentation rate (Completed)    Urinalysis (Completed)    HIV 1/2 " Ag/Ab (4th Gen)    Quantiferon Gold TB    Hepatic function panel    TSH (Completed)    T3 (Completed)    T4               Plan:   -Sending for CBC, CMP, CRP, ESR, LFTs, Thyroid panel, UA, HIV, and TB QuantiFERON Gold today to start preliminary workup   -Will review notes from his upcoming appointment with Hematology/Oncology once available  -Will plan to see again in about 1 month to review his labs and discuss next steps.        Portions of this note dictated using EMR integrated voice recognition software, and may be subject to voice recognition errors not corrected at proofreading. Please contact writer for clarification if needed.    60 minutes of total time spent on the encounter, which includes face to face time and non-face to face time preparing to see the patient (eg, review of tests), Obtaining and/or reviewing separately obtained history, Documenting clinical information in the electronic or other health record, Independently interpreting results (not separately reported) and communicating results to the patient/family/caregiver, or Care coordination (not separately reported).

## 2025-07-09 LAB — HIV 1+2 AB+HIV1 P24 AG SERPL QL IA: NONREACTIVE

## 2025-07-10 LAB
GAMMA INTERFERON BACKGROUND BLD IA-ACNC: 0.01 IU/ML
M TB IFN-G BLD-IMP: NEGATIVE
M TB IFN-G CD4+ BCKGRND COR BLD-ACNC: 0.01 IU/ML
M TB IFN-G CD4+CD8+ BCKGRND COR BLD-ACNC: 0.01 IU/ML
MITOGEN IGNF BCKGRD COR BLD-ACNC: >10 IU/ML

## 2025-08-12 ENCOUNTER — OFFICE VISIT (OUTPATIENT)
Dept: NEUROLOGY | Facility: CLINIC | Age: 55
End: 2025-08-12
Payer: MEDICARE

## 2025-08-12 VITALS
BODY MASS INDEX: 21.82 KG/M2 | HEIGHT: 74 IN | WEIGHT: 170 LBS | SYSTOLIC BLOOD PRESSURE: 100 MMHG | DIASTOLIC BLOOD PRESSURE: 60 MMHG

## 2025-08-12 DIAGNOSIS — G81.91 HEMIPLEGIA AFFECTING RIGHT DOMINANT SIDE, UNSPECIFIED ETIOLOGY, UNSPECIFIED HEMIPLEGIA TYPE: Primary | ICD-10-CM

## 2025-08-12 DIAGNOSIS — G20.B2 PARKINSON'S DISEASE WITH DYSKINESIA, WITH FLUCTUATIONS: ICD-10-CM

## 2025-08-12 DIAGNOSIS — R56.9 CONVULSIONS, UNSPECIFIED CONVULSION TYPE: ICD-10-CM

## 2025-08-12 DIAGNOSIS — E66.01 MORBID (SEVERE) OBESITY DUE TO EXCESS CALORIES: ICD-10-CM

## 2025-08-12 PROCEDURE — 1159F MED LIST DOCD IN RCRD: CPT | Mod: CPTII,S$GLB,, | Performed by: PSYCHIATRY & NEUROLOGY

## 2025-08-12 PROCEDURE — 3078F DIAST BP <80 MM HG: CPT | Mod: CPTII,S$GLB,, | Performed by: PSYCHIATRY & NEUROLOGY

## 2025-08-12 PROCEDURE — 3008F BODY MASS INDEX DOCD: CPT | Mod: CPTII,S$GLB,, | Performed by: PSYCHIATRY & NEUROLOGY

## 2025-08-12 PROCEDURE — 99214 OFFICE O/P EST MOD 30 MIN: CPT | Mod: S$GLB,,, | Performed by: PSYCHIATRY & NEUROLOGY

## 2025-08-12 PROCEDURE — 3074F SYST BP LT 130 MM HG: CPT | Mod: CPTII,S$GLB,, | Performed by: PSYCHIATRY & NEUROLOGY

## 2025-08-12 PROCEDURE — 99999 PR PBB SHADOW E&M-EST. PATIENT-LVL III: CPT | Mod: PBBFAC,,, | Performed by: PSYCHIATRY & NEUROLOGY

## 2025-08-12 RX ORDER — NALOXEGOL OXALATE 25 MG/1
25 TABLET, FILM COATED ORAL EVERY MORNING
COMMUNITY

## 2025-08-12 RX ORDER — CARBIDOPA AND LEVODOPA 10; 100 MG/1; MG/1
1 TABLET ORAL 3 TIMES DAILY
Qty: 270 TABLET | Refills: 3 | Status: SHIPPED | OUTPATIENT
Start: 2025-08-12 | End: 2026-08-12

## 2025-08-12 RX ORDER — AMANTADINE HYDROCHLORIDE 100 MG/1
200 CAPSULE, GELATIN COATED ORAL DAILY
Qty: 180 CAPSULE | Refills: 3 | Status: SHIPPED | OUTPATIENT
Start: 2025-08-12

## 2025-08-12 RX ORDER — BUPROPION HYDROCHLORIDE 100 MG/1
TABLET ORAL
COMMUNITY

## 2025-08-12 RX ORDER — CARBIDOPA AND LEVODOPA 25; 250 MG/1; MG/1
1 TABLET ORAL 3 TIMES DAILY
COMMUNITY
Start: 2025-07-14 | End: 2025-08-12

## 2025-08-13 ENCOUNTER — OFFICE VISIT (OUTPATIENT)
Dept: INFECTIOUS DISEASES | Facility: CLINIC | Age: 55
End: 2025-08-13
Payer: MEDICARE

## 2025-08-13 VITALS
BODY MASS INDEX: 22.41 KG/M2 | RESPIRATION RATE: 18 BRPM | HEIGHT: 74 IN | SYSTOLIC BLOOD PRESSURE: 110 MMHG | OXYGEN SATURATION: 98 % | WEIGHT: 174.63 LBS | DIASTOLIC BLOOD PRESSURE: 76 MMHG | HEART RATE: 77 BPM

## 2025-08-13 DIAGNOSIS — L98.9 SKIN LESION: Primary | ICD-10-CM

## 2025-08-13 DIAGNOSIS — R22.9 SKIN NODULE: ICD-10-CM

## 2025-08-13 DIAGNOSIS — R63.4 UNINTENTIONAL WEIGHT LOSS: ICD-10-CM

## 2025-08-13 PROCEDURE — 3074F SYST BP LT 130 MM HG: CPT | Mod: CPTII,S$GLB,,

## 2025-08-13 PROCEDURE — 1159F MED LIST DOCD IN RCRD: CPT | Mod: CPTII,S$GLB,,

## 2025-08-13 PROCEDURE — 99999 PR PBB SHADOW E&M-EST. PATIENT-LVL IV: CPT | Mod: PBBFAC,,,

## 2025-08-13 PROCEDURE — 3008F BODY MASS INDEX DOCD: CPT | Mod: CPTII,S$GLB,,

## 2025-08-13 PROCEDURE — 99214 OFFICE O/P EST MOD 30 MIN: CPT | Mod: S$GLB,,,

## 2025-08-13 PROCEDURE — 3078F DIAST BP <80 MM HG: CPT | Mod: CPTII,S$GLB,,

## 2025-08-24 ENCOUNTER — HOSPITAL ENCOUNTER (OUTPATIENT)
Facility: HOSPITAL | Age: 55
Discharge: HOME | End: 2025-08-26
Attending: EMERGENCY MEDICINE | Admitting: INTERNAL MEDICINE
Payer: MEDICARE

## 2025-08-24 DIAGNOSIS — R06.09 DYSPNEA ON EXERTION: ICD-10-CM

## 2025-08-24 DIAGNOSIS — I50.9 CHF (CONGESTIVE HEART FAILURE): ICD-10-CM

## 2025-08-24 DIAGNOSIS — S00.512A ABRASION OF TONGUE, INITIAL ENCOUNTER: ICD-10-CM

## 2025-08-24 DIAGNOSIS — R63.4 UNINTENTIONAL WEIGHT LOSS: ICD-10-CM

## 2025-08-24 DIAGNOSIS — R07.9 CHEST PAIN: ICD-10-CM

## 2025-08-24 DIAGNOSIS — R07.9 CHEST PAIN ON EXERTION: Primary | ICD-10-CM

## 2025-08-24 LAB
ACCEPTIBLE SP GR UR QL: 1.04 (ref 1–1.03)
ALBUMIN SERPL-MCNC: 4.2 G/DL (ref 3.5–5)
ALBUMIN/GLOB SERPL: 1.1 RATIO (ref 1.1–2)
ALP SERPL-CCNC: 153 UNIT/L (ref 40–150)
ALT SERPL-CCNC: 7 UNIT/L (ref 0–55)
AMPHET UR QL SCN: NEGATIVE
ANION GAP SERPL CALC-SCNC: 13 MEQ/L
AST SERPL-CCNC: 11 UNIT/L (ref 11–45)
BARBITURATE SCN PRESENT UR: NEGATIVE
BASOPHILS # BLD AUTO: 0.14 X10(3)/MCL
BASOPHILS NFR BLD AUTO: 1.4 %
BENZODIAZ UR QL SCN: NEGATIVE
BILIRUB SERPL-MCNC: 1.5 MG/DL
BUN SERPL-MCNC: 10.8 MG/DL (ref 8.4–25.7)
CALCIUM SERPL-MCNC: 10.2 MG/DL (ref 8.4–10.2)
CANNABINOIDS UR QL SCN: POSITIVE
CHLORIDE SERPL-SCNC: 107 MMOL/L (ref 98–107)
CO2 SERPL-SCNC: 22 MMOL/L (ref 22–29)
COCAINE UR QL SCN: NEGATIVE
CREAT SERPL-MCNC: 0.97 MG/DL (ref 0.72–1.25)
CREAT/UREA NIT SERPL: 11
EOSINOPHIL # BLD AUTO: 0.07 X10(3)/MCL (ref 0–0.9)
EOSINOPHIL NFR BLD AUTO: 0.7 %
ERYTHROCYTE [DISTWIDTH] IN BLOOD BY AUTOMATED COUNT: 13.2 % (ref 11.5–17)
FENTANYL UR QL SCN: NEGATIVE
FLUAV AG UPPER RESP QL IA.RAPID: NOT DETECTED
FLUBV AG UPPER RESP QL IA.RAPID: NOT DETECTED
GFR SERPLBLD CREATININE-BSD FMLA CKD-EPI: >60 ML/MIN/1.73/M2
GLOBULIN SER-MCNC: 4 GM/DL (ref 2.4–3.5)
GLUCOSE SERPL-MCNC: 148 MG/DL (ref 74–100)
HCT VFR BLD AUTO: 52.5 % (ref 42–52)
HGB BLD-MCNC: 17.3 G/DL (ref 14–18)
IMM GRANULOCYTES # BLD AUTO: 0.03 X10(3)/MCL (ref 0–0.04)
IMM GRANULOCYTES NFR BLD AUTO: 0.3 %
LYMPHOCYTES # BLD AUTO: 2.22 X10(3)/MCL (ref 0.6–4.6)
LYMPHOCYTES NFR BLD AUTO: 22.8 %
MCH RBC QN AUTO: 28.2 PG (ref 27–31)
MCHC RBC AUTO-ENTMCNC: 33 G/DL (ref 33–36)
MCV RBC AUTO: 85.6 FL (ref 80–94)
MDMA UR QL SCN: NEGATIVE
MONOCYTES # BLD AUTO: 0.68 X10(3)/MCL (ref 0.1–1.3)
MONOCYTES NFR BLD AUTO: 7 %
NEUTROPHILS # BLD AUTO: 6.58 X10(3)/MCL (ref 2.1–9.2)
NEUTROPHILS NFR BLD AUTO: 67.8 %
NRBC BLD AUTO-RTO: 0 %
NT-PROBNP SERPL-MCNC: 254 PG/ML
OHS QRS DURATION: 90 MS
OHS QTC CALCULATION: 462 MS
OPIATES UR QL SCN: POSITIVE
PCP UR QL: NEGATIVE
PH UR: 6.5 [PH] (ref 3–11)
PLATELET # BLD AUTO: 289 X10(3)/MCL (ref 130–400)
PMV BLD AUTO: 9 FL (ref 7.4–10.4)
POTASSIUM SERPL-SCNC: 4.7 MMOL/L (ref 3.5–5.1)
PROT SERPL-MCNC: 8.2 GM/DL (ref 6.4–8.3)
RBC # BLD AUTO: 6.13 X10(6)/MCL (ref 4.7–6.1)
RSV A 5' UTR RNA NPH QL NAA+PROBE: NOT DETECTED
SARS-COV-2 RNA RESP QL NAA+PROBE: NOT DETECTED
SODIUM SERPL-SCNC: 142 MMOL/L (ref 136–145)
TROPONIN I SERPL HS-MCNC: 8 NG/L
TROPONIN I SERPL HS-MCNC: 9 NG/L
TSH SERPL-ACNC: 1.08 UIU/ML (ref 0.35–4.94)
WBC # BLD AUTO: 9.72 X10(3)/MCL (ref 4.5–11.5)

## 2025-08-24 PROCEDURE — 87637 SARSCOV2&INF A&B&RSV AMP PRB: CPT | Performed by: NURSE PRACTITIONER

## 2025-08-24 PROCEDURE — 80053 COMPREHEN METABOLIC PANEL: CPT | Performed by: PHYSICIAN ASSISTANT

## 2025-08-24 PROCEDURE — G0378 HOSPITAL OBSERVATION PER HR: HCPCS

## 2025-08-24 PROCEDURE — 99285 EMERGENCY DEPT VISIT HI MDM: CPT | Mod: 25

## 2025-08-24 PROCEDURE — 63600175 PHARM REV CODE 636 W HCPCS: Performed by: EMERGENCY MEDICINE

## 2025-08-24 PROCEDURE — 84484 ASSAY OF TROPONIN QUANT: CPT | Mod: 91 | Performed by: NURSE PRACTITIONER

## 2025-08-24 PROCEDURE — 83880 ASSAY OF NATRIURETIC PEPTIDE: CPT | Performed by: PHYSICIAN ASSISTANT

## 2025-08-24 PROCEDURE — 96375 TX/PRO/DX INJ NEW DRUG ADDON: CPT

## 2025-08-24 PROCEDURE — 85025 COMPLETE CBC W/AUTO DIFF WBC: CPT | Performed by: PHYSICIAN ASSISTANT

## 2025-08-24 PROCEDURE — 84443 ASSAY THYROID STIM HORMONE: CPT | Performed by: EMERGENCY MEDICINE

## 2025-08-24 PROCEDURE — 84484 ASSAY OF TROPONIN QUANT: CPT | Performed by: PHYSICIAN ASSISTANT

## 2025-08-24 PROCEDURE — 96374 THER/PROPH/DIAG INJ IV PUSH: CPT

## 2025-08-24 PROCEDURE — 96361 HYDRATE IV INFUSION ADD-ON: CPT

## 2025-08-24 PROCEDURE — 25500020 PHARM REV CODE 255: Performed by: EMERGENCY MEDICINE

## 2025-08-24 PROCEDURE — 93005 ELECTROCARDIOGRAM TRACING: CPT

## 2025-08-24 PROCEDURE — 87641 MR-STAPH DNA AMP PROBE: CPT | Performed by: INTERNAL MEDICINE

## 2025-08-24 PROCEDURE — 93010 ELECTROCARDIOGRAM REPORT: CPT | Mod: ,,, | Performed by: INTERNAL MEDICINE

## 2025-08-24 PROCEDURE — 25000003 PHARM REV CODE 250: Performed by: EMERGENCY MEDICINE

## 2025-08-24 PROCEDURE — 80307 DRUG TEST PRSMV CHEM ANLYZR: CPT | Performed by: NURSE PRACTITIONER

## 2025-08-24 RX ORDER — BUPROPION HYDROCHLORIDE 100 MG/1
100 TABLET ORAL DAILY
Status: DISCONTINUED | OUTPATIENT
Start: 2025-08-25 | End: 2025-08-26 | Stop reason: HOSPADM

## 2025-08-24 RX ORDER — DIPHENHYDRAMINE HYDROCHLORIDE 50 MG/ML
25 INJECTION, SOLUTION INTRAMUSCULAR; INTRAVENOUS
Status: COMPLETED | OUTPATIENT
Start: 2025-08-24 | End: 2025-08-24

## 2025-08-24 RX ORDER — ESCITALOPRAM OXALATE 10 MG/1
20 TABLET ORAL DAILY
Status: DISCONTINUED | OUTPATIENT
Start: 2025-08-25 | End: 2025-08-26 | Stop reason: HOSPADM

## 2025-08-24 RX ORDER — CARBIDOPA AND LEVODOPA 10; 100 MG/1; MG/1
1 TABLET ORAL 3 TIMES DAILY
Status: DISCONTINUED | OUTPATIENT
Start: 2025-08-25 | End: 2025-08-26 | Stop reason: HOSPADM

## 2025-08-24 RX ORDER — PROCHLORPERAZINE EDISYLATE 5 MG/ML
5 INJECTION INTRAMUSCULAR; INTRAVENOUS EVERY 6 HOURS PRN
Status: DISCONTINUED | OUTPATIENT
Start: 2025-08-24 | End: 2025-08-26 | Stop reason: HOSPADM

## 2025-08-24 RX ORDER — IPRATROPIUM BROMIDE AND ALBUTEROL SULFATE 2.5; .5 MG/3ML; MG/3ML
3 SOLUTION RESPIRATORY (INHALATION) EVERY 4 HOURS PRN
Status: DISCONTINUED | OUTPATIENT
Start: 2025-08-24 | End: 2025-08-26 | Stop reason: HOSPADM

## 2025-08-24 RX ORDER — ASPIRIN 325 MG
325 TABLET, DELAYED RELEASE (ENTERIC COATED) ORAL
Status: COMPLETED | OUTPATIENT
Start: 2025-08-24 | End: 2025-08-24

## 2025-08-24 RX ORDER — HYDROXYZINE HYDROCHLORIDE 50 MG/1
50 TABLET, FILM COATED ORAL 2 TIMES DAILY
Status: DISCONTINUED | OUTPATIENT
Start: 2025-08-25 | End: 2025-08-26 | Stop reason: HOSPADM

## 2025-08-24 RX ORDER — TALC
6 POWDER (GRAM) TOPICAL NIGHTLY PRN
Status: DISCONTINUED | OUTPATIENT
Start: 2025-08-24 | End: 2025-08-26 | Stop reason: HOSPADM

## 2025-08-24 RX ORDER — SODIUM CHLORIDE 0.9 % (FLUSH) 0.9 %
10 SYRINGE (ML) INJECTION
Status: DISCONTINUED | OUTPATIENT
Start: 2025-08-24 | End: 2025-08-26 | Stop reason: HOSPADM

## 2025-08-24 RX ORDER — PANTOPRAZOLE SODIUM 40 MG/10ML
40 INJECTION, POWDER, LYOPHILIZED, FOR SOLUTION INTRAVENOUS 2 TIMES DAILY
Status: DISCONTINUED | OUTPATIENT
Start: 2025-08-24 | End: 2025-08-26

## 2025-08-24 RX ORDER — ACETAMINOPHEN 500 MG
1000 TABLET ORAL EVERY 6 HOURS PRN
Status: DISCONTINUED | OUTPATIENT
Start: 2025-08-24 | End: 2025-08-26 | Stop reason: HOSPADM

## 2025-08-24 RX ORDER — ALUMINUM HYDROXIDE, MAGNESIUM HYDROXIDE, AND SIMETHICONE 1200; 120; 1200 MG/30ML; MG/30ML; MG/30ML
30 SUSPENSION ORAL 4 TIMES DAILY PRN
Status: DISCONTINUED | OUTPATIENT
Start: 2025-08-24 | End: 2025-08-26 | Stop reason: HOSPADM

## 2025-08-24 RX ORDER — HYDROCODONE BITARTRATE AND ACETAMINOPHEN 10; 325 MG/1; MG/1
1 TABLET ORAL EVERY 6 HOURS PRN
Refills: 0 | Status: DISCONTINUED | OUTPATIENT
Start: 2025-08-25 | End: 2025-08-26 | Stop reason: HOSPADM

## 2025-08-24 RX ORDER — ATORVASTATIN CALCIUM 10 MG/1
10 TABLET, FILM COATED ORAL DAILY
Status: DISCONTINUED | OUTPATIENT
Start: 2025-08-25 | End: 2025-08-26 | Stop reason: HOSPADM

## 2025-08-24 RX ORDER — IBUPROFEN 200 MG
16 TABLET ORAL
Status: DISCONTINUED | OUTPATIENT
Start: 2025-08-24 | End: 2025-08-26 | Stop reason: HOSPADM

## 2025-08-24 RX ORDER — ONDANSETRON HYDROCHLORIDE 2 MG/ML
4 INJECTION, SOLUTION INTRAVENOUS
Status: COMPLETED | OUTPATIENT
Start: 2025-08-24 | End: 2025-08-24

## 2025-08-24 RX ORDER — NITROGLYCERIN 0.4 MG/1
0.4 TABLET SUBLINGUAL EVERY 5 MIN PRN
Status: DISCONTINUED | OUTPATIENT
Start: 2025-08-24 | End: 2025-08-26 | Stop reason: HOSPADM

## 2025-08-24 RX ORDER — GLUCAGON 1 MG
1 KIT INJECTION
Status: DISCONTINUED | OUTPATIENT
Start: 2025-08-24 | End: 2025-08-26 | Stop reason: HOSPADM

## 2025-08-24 RX ORDER — ONDANSETRON HYDROCHLORIDE 2 MG/ML
4 INJECTION, SOLUTION INTRAVENOUS EVERY 4 HOURS PRN
Status: DISCONTINUED | OUTPATIENT
Start: 2025-08-24 | End: 2025-08-26 | Stop reason: HOSPADM

## 2025-08-24 RX ORDER — LIDOCAINE HYDROCHLORIDE 20 MG/ML
5 SOLUTION OROPHARYNGEAL
Status: COMPLETED | OUTPATIENT
Start: 2025-08-24 | End: 2025-08-24

## 2025-08-24 RX ORDER — AMOXICILLIN 250 MG
1 CAPSULE ORAL 2 TIMES DAILY PRN
Status: DISCONTINUED | OUTPATIENT
Start: 2025-08-24 | End: 2025-08-26 | Stop reason: HOSPADM

## 2025-08-24 RX ORDER — NALOXONE HCL 0.4 MG/ML
0.02 VIAL (ML) INJECTION
Status: DISCONTINUED | OUTPATIENT
Start: 2025-08-24 | End: 2025-08-26 | Stop reason: HOSPADM

## 2025-08-24 RX ORDER — BISACODYL 10 MG/1
10 SUPPOSITORY RECTAL DAILY PRN
Status: DISCONTINUED | OUTPATIENT
Start: 2025-08-24 | End: 2025-08-26 | Stop reason: HOSPADM

## 2025-08-24 RX ORDER — IBUPROFEN 200 MG
24 TABLET ORAL
Status: DISCONTINUED | OUTPATIENT
Start: 2025-08-24 | End: 2025-08-26 | Stop reason: HOSPADM

## 2025-08-24 RX ADMIN — IOHEXOL 100 ML: 350 INJECTION, SOLUTION INTRAVENOUS at 04:08

## 2025-08-24 RX ADMIN — DIPHENHYDRAMINE HYDROCHLORIDE 25 MG: 50 INJECTION INTRAMUSCULAR; INTRAVENOUS at 04:08

## 2025-08-24 RX ADMIN — LIDOCAINE HYDROCHLORIDE 5 ML: 20 SOLUTION ORAL at 03:08

## 2025-08-24 RX ADMIN — SODIUM CHLORIDE, POTASSIUM CHLORIDE, SODIUM LACTATE AND CALCIUM CHLORIDE 1000 ML: 600; 310; 30; 20 INJECTION, SOLUTION INTRAVENOUS at 04:08

## 2025-08-24 RX ADMIN — ASPIRIN 325 MG: 325 TABLET, COATED ORAL at 08:08

## 2025-08-24 RX ADMIN — ONDANSETRON 4 MG: 2 INJECTION INTRAMUSCULAR; INTRAVENOUS at 04:08

## 2025-08-25 LAB
ALBUMIN SERPL-MCNC: 3.9 G/DL (ref 3.5–5)
ALBUMIN/GLOB SERPL: 1.1 RATIO (ref 1.1–2)
ALP SERPL-CCNC: 137 UNIT/L (ref 40–150)
ALT SERPL-CCNC: 14 UNIT/L (ref 0–55)
ANION GAP SERPL CALC-SCNC: 9 MEQ/L
AORTIC SIZE INDEX (SOV): 1.7 CM/M2
APICAL FOUR CHAMBER EJECTION FRACTION: 55 %
APICAL TWO CHAMBER EJECTION FRACTION: 61 %
AST SERPL-CCNC: 10 UNIT/L (ref 11–45)
AV INDEX (PROSTH): 0.67
AV MEAN GRADIENT: 5 MMHG
AV PEAK GRADIENT: 9 MMHG
AV VALVE AREA BY VELOCITY RATIO: 1.9 CM²
AV VALVE AREA: 2.1 CM²
AV VELOCITY RATIO: 0.6
BASOPHILS # BLD AUTO: 0.13 X10(3)/MCL
BASOPHILS NFR BLD AUTO: 1.6 %
BILIRUB SERPL-MCNC: 1.7 MG/DL
BSA FOR ECHO PROCEDURE: 2.01 M2
BUN SERPL-MCNC: 11.2 MG/DL (ref 8.4–25.7)
CALCIUM SERPL-MCNC: 9.4 MG/DL (ref 8.4–10.2)
CHLORIDE SERPL-SCNC: 109 MMOL/L (ref 98–107)
CO2 SERPL-SCNC: 23 MMOL/L (ref 22–29)
CREAT SERPL-MCNC: 0.88 MG/DL (ref 0.72–1.25)
CREAT/UREA NIT SERPL: 13
CV ECHO LV RWT: 0.34 CM
DOP CALC AO PEAK VEL: 1.5 M/S
DOP CALC AO VTI: 32.8 CM
DOP CALC LVOT AREA: 3.1 CM2
DOP CALC LVOT DIAMETER: 2 CM
DOP CALC LVOT PEAK VEL: 0.9 M/S
DOP CALC MV VTI: 31.3 CM
DOP CALCLVOT PEAK VEL VTI: 21.9 CM
E WAVE DECELERATION TIME: 193 MSEC
E/A RATIO: 1.31
E/E' RATIO: 6 M/S
ECHO LV POSTERIOR WALL: 0.9 CM (ref 0.6–1.1)
EOSINOPHIL # BLD AUTO: 0.04 X10(3)/MCL (ref 0–0.9)
EOSINOPHIL NFR BLD AUTO: 0.5 %
ERYTHROCYTE [DISTWIDTH] IN BLOOD BY AUTOMATED COUNT: 12.9 % (ref 11.5–17)
EST. AVERAGE GLUCOSE BLD GHB EST-MCNC: 105.4 MG/DL
FRACTIONAL SHORTENING: 32.1 % (ref 28–44)
GFR SERPLBLD CREATININE-BSD FMLA CKD-EPI: >60 ML/MIN/1.73/M2
GLOBULIN SER-MCNC: 3.5 GM/DL (ref 2.4–3.5)
GLUCOSE SERPL-MCNC: 87 MG/DL (ref 74–100)
HBA1C MFR BLD: 5.3 %
HCT VFR BLD AUTO: 47.8 % (ref 42–52)
HGB BLD-MCNC: 15.9 G/DL (ref 14–18)
HR MV ECHO: 57 BPM
IMM GRANULOCYTES # BLD AUTO: 0.02 X10(3)/MCL (ref 0–0.04)
IMM GRANULOCYTES NFR BLD AUTO: 0.2 %
INTERVENTRICULAR SEPTUM: 0.9 CM (ref 0.6–1.1)
LEFT ATRIUM AREA SYSTOLIC (APICAL 2 CHAMBER): 18.5 CM2
LEFT ATRIUM AREA SYSTOLIC (APICAL 4 CHAMBER): 19.9 CM2
LEFT ATRIUM SIZE: 3.8 CM
LEFT ATRIUM VOLUME INDEX MOD: 29 ML/M2
LEFT ATRIUM VOLUME MOD: 59 ML
LEFT INTERNAL DIMENSION IN SYSTOLE: 3.6 CM (ref 2.1–4)
LEFT VENTRICLE DIASTOLIC VOLUME INDEX: 66.5 ML/M2
LEFT VENTRICLE DIASTOLIC VOLUME: 135 ML
LEFT VENTRICLE END DIASTOLIC VOLUME APICAL 2 CHAMBER: 116 ML
LEFT VENTRICLE END DIASTOLIC VOLUME APICAL 4 CHAMBER INDEX BSA: 73.89 ML/M2
LEFT VENTRICLE END DIASTOLIC VOLUME APICAL 4 CHAMBER: 150 ML
LEFT VENTRICLE END SYSTOLIC VOLUME APICAL 2 CHAMBER: 57.4 ML
LEFT VENTRICLE END SYSTOLIC VOLUME APICAL 4 CHAMBER: 51 ML
LEFT VENTRICLE MASS INDEX: 86 G/M2
LEFT VENTRICLE SYSTOLIC VOLUME INDEX: 26.6 ML/M2
LEFT VENTRICLE SYSTOLIC VOLUME: 54 ML
LEFT VENTRICULAR INTERNAL DIMENSION IN DIASTOLE: 5.3 CM (ref 3.5–6)
LEFT VENTRICULAR MASS: 174.5 G
LV LATERAL E/E' RATIO: 5 M/S
LV SEPTAL E/E' RATIO: 7.1 M/S
LVED V (TEICH): 135.34 ML
LVES V (TEICH): 54.43 ML
LVOT MG: 2 MMHG
LVOT MV: 0.59 CM/S
LYMPHOCYTES # BLD AUTO: 1.85 X10(3)/MCL (ref 0.6–4.6)
LYMPHOCYTES NFR BLD AUTO: 22.8 %
Lab: 1.8 CM/M
MAGNESIUM SERPL-MCNC: 1.6 MG/DL (ref 1.6–2.6)
MCH RBC QN AUTO: 28 PG (ref 27–31)
MCHC RBC AUTO-ENTMCNC: 33.3 G/DL (ref 33–36)
MCV RBC AUTO: 84.2 FL (ref 80–94)
MONOCYTES # BLD AUTO: 0.57 X10(3)/MCL (ref 0.1–1.3)
MONOCYTES NFR BLD AUTO: 7 %
MRSA PCR SCRN (OHS): NOT DETECTED
MV MEAN GRADIENT: 1 MMHG
MV PEAK A VEL: 0.65 M/S
MV PEAK E VEL: 0.85 M/S
MV PEAK GRADIENT: 3 MMHG
MV STENOSIS PRESSURE HALF TIME: 104 MS
MV VALVE AREA BY CONTINUITY EQUATION: 2.2 CM2
MV VALVE AREA P 1/2 METHOD: 2.12 CM2
NEUTROPHILS # BLD AUTO: 5.52 X10(3)/MCL (ref 2.1–9.2)
NEUTROPHILS NFR BLD AUTO: 67.9 %
NRBC BLD AUTO-RTO: 0 %
OHS CV CPX PATIENT HEIGHT IN: 74
OHS LV EJECTION FRACTION SIMPSONS BIPLANE MOD: 59 %
OHS QRS DURATION: 98 MS
OHS QTC CALCULATION: 465 MS
PISA TR MAX VEL: 2.1 M/S
PLATELET # BLD AUTO: 252 X10(3)/MCL (ref 130–400)
PMV BLD AUTO: 9.4 FL (ref 7.4–10.4)
POTASSIUM SERPL-SCNC: 4.1 MMOL/L (ref 3.5–5.1)
PROT SERPL-MCNC: 7.4 GM/DL (ref 6.4–8.3)
RA PRESSURE ESTIMATED: 3 MMHG
RBC # BLD AUTO: 5.68 X10(6)/MCL (ref 4.7–6.1)
RV TB RVSP: 5 MMHG
SINUS: 3.4 CM
SODIUM SERPL-SCNC: 141 MMOL/L (ref 136–145)
TDI LATERAL: 0.17 M/S
TDI SEPTAL: 0.12 M/S
TDI: 0.15 M/S
TR MAX PG: 17 MMHG
TRICUSPID ANNULAR PLANE SYSTOLIC EXCURSION: 2.1 CM
TROPONIN I SERPL HS-MCNC: 10 NG/L
TV REST PULMONARY ARTERY PRESSURE: 21 MMHG
WBC # BLD AUTO: 8.13 X10(3)/MCL (ref 4.5–11.5)
Z-SCORE OF LEFT VENTRICULAR DIMENSION IN END DIASTOLE: -1.27
Z-SCORE OF LEFT VENTRICULAR DIMENSION IN END SYSTOLE: -0.19

## 2025-08-25 PROCEDURE — 84484 ASSAY OF TROPONIN QUANT: CPT | Performed by: INTERNAL MEDICINE

## 2025-08-25 PROCEDURE — 83036 HEMOGLOBIN GLYCOSYLATED A1C: CPT | Performed by: INTERNAL MEDICINE

## 2025-08-25 PROCEDURE — 83735 ASSAY OF MAGNESIUM: CPT | Performed by: INTERNAL MEDICINE

## 2025-08-25 PROCEDURE — 96375 TX/PRO/DX INJ NEW DRUG ADDON: CPT

## 2025-08-25 PROCEDURE — 25000003 PHARM REV CODE 250: Performed by: INTERNAL MEDICINE

## 2025-08-25 PROCEDURE — 80053 COMPREHEN METABOLIC PANEL: CPT | Performed by: INTERNAL MEDICINE

## 2025-08-25 PROCEDURE — 63600175 PHARM REV CODE 636 W HCPCS: Performed by: INTERNAL MEDICINE

## 2025-08-25 PROCEDURE — 85025 COMPLETE CBC W/AUTO DIFF WBC: CPT | Performed by: INTERNAL MEDICINE

## 2025-08-25 PROCEDURE — G0378 HOSPITAL OBSERVATION PER HR: HCPCS

## 2025-08-25 PROCEDURE — 36415 COLL VENOUS BLD VENIPUNCTURE: CPT | Performed by: INTERNAL MEDICINE

## 2025-08-25 PROCEDURE — A9537 TC99M MEBROFENIN: HCPCS | Performed by: INTERNAL MEDICINE

## 2025-08-25 PROCEDURE — 96376 TX/PRO/DX INJ SAME DRUG ADON: CPT

## 2025-08-25 RX ORDER — KIT FOR THE PREPARATION OF TECHNETIUM TC 99M MEBROFENIN 45 MG/10ML
8 INJECTION, POWDER, LYOPHILIZED, FOR SOLUTION INTRAVENOUS
Status: COMPLETED | OUTPATIENT
Start: 2025-08-25 | End: 2025-08-25

## 2025-08-25 RX ORDER — MUPIROCIN 20 MG/G
OINTMENT TOPICAL DAILY
Status: DISCONTINUED | OUTPATIENT
Start: 2025-08-25 | End: 2025-08-26 | Stop reason: HOSPADM

## 2025-08-25 RX ADMIN — HYDROCODONE BITARTRATE AND ACETAMINOPHEN 1 TABLET: 10; 325 TABLET ORAL at 07:08

## 2025-08-25 RX ADMIN — ESCITALOPRAM OXALATE 20 MG: 10 TABLET ORAL at 08:08

## 2025-08-25 RX ADMIN — NALOXEGOL OXALATE 25 MG: 25 TABLET, FILM COATED ORAL at 06:08

## 2025-08-25 RX ADMIN — LIDOCAINE HYDROCHLORIDE 15 ML: 20 SOLUTION ORAL; TOPICAL at 09:08

## 2025-08-25 RX ADMIN — BUPROPION HYDROCHLORIDE 100 MG: 100 TABLET, FILM COATED ORAL at 08:08

## 2025-08-25 RX ADMIN — LIDOCAINE HYDROCHLORIDE 15 ML: 20 SOLUTION ORAL; TOPICAL at 06:08

## 2025-08-25 RX ADMIN — ACETAMINOPHEN 1000 MG: 500 TABLET ORAL at 09:08

## 2025-08-25 RX ADMIN — HYDROXYZINE HYDROCHLORIDE 50 MG: 50 TABLET, FILM COATED ORAL at 08:08

## 2025-08-25 RX ADMIN — CARBIDOPA AND LEVODOPA 1 TABLET: 10; 100 TABLET ORAL at 08:08

## 2025-08-25 RX ADMIN — HYDROCODONE BITARTRATE AND ACETAMINOPHEN 1 TABLET: 10; 325 TABLET ORAL at 08:08

## 2025-08-25 RX ADMIN — PANTOPRAZOLE SODIUM 40 MG: 40 INJECTION, POWDER, FOR SOLUTION INTRAVENOUS at 09:08

## 2025-08-25 RX ADMIN — Medication 6 MG: at 09:08

## 2025-08-25 RX ADMIN — AMANTADINE HYDROCHLORIDE 200 MG: 100 CAPSULE, LIQUID FILLED ORAL at 08:08

## 2025-08-25 RX ADMIN — MEBROFENIN 8 MILLICURIE: 45 INJECTION, POWDER, LYOPHILIZED, FOR SOLUTION INTRAVENOUS at 12:08

## 2025-08-25 RX ADMIN — CARBIDOPA AND LEVODOPA 1 TABLET: 10; 100 TABLET ORAL at 03:08

## 2025-08-25 RX ADMIN — HYDROCODONE BITARTRATE AND ACETAMINOPHEN 1 TABLET: 10; 325 TABLET ORAL at 02:08

## 2025-08-25 RX ADMIN — CARBIDOPA AND LEVODOPA 1 TABLET: 10; 100 TABLET ORAL at 09:08

## 2025-08-25 RX ADMIN — PANTOPRAZOLE SODIUM 40 MG: 40 INJECTION, POWDER, FOR SOLUTION INTRAVENOUS at 12:08

## 2025-08-25 RX ADMIN — PANTOPRAZOLE SODIUM 40 MG: 40 INJECTION, POWDER, FOR SOLUTION INTRAVENOUS at 08:08

## 2025-08-25 RX ADMIN — ATORVASTATIN CALCIUM 10 MG: 10 TABLET, FILM COATED ORAL at 08:08

## 2025-08-25 RX ADMIN — LIDOCAINE HYDROCHLORIDE 15 ML: 20 SOLUTION ORAL; TOPICAL at 02:08

## 2025-08-25 RX ADMIN — MUPIROCIN: 20 OINTMENT TOPICAL at 03:08

## 2025-08-25 RX ADMIN — APIXABAN 5 MG: 5 TABLET, FILM COATED ORAL at 09:08

## 2025-08-25 RX ADMIN — LIDOCAINE HYDROCHLORIDE 15 ML: 20 SOLUTION ORAL; TOPICAL at 10:08

## 2025-08-25 RX ADMIN — APIXABAN 5 MG: 5 TABLET, FILM COATED ORAL at 08:08

## 2025-08-25 RX ADMIN — HYDROXYZINE HYDROCHLORIDE 50 MG: 50 TABLET, FILM COATED ORAL at 09:08

## 2025-08-26 PROCEDURE — 25000003 PHARM REV CODE 250: Performed by: INTERNAL MEDICINE

## 2025-08-26 PROCEDURE — G0378 HOSPITAL OBSERVATION PER HR: HCPCS

## 2025-08-26 RX ORDER — PANTOPRAZOLE SODIUM 40 MG/1
40 TABLET, DELAYED RELEASE ORAL 2 TIMES DAILY
Status: DISCONTINUED | OUTPATIENT
Start: 2025-08-26 | End: 2025-08-26 | Stop reason: HOSPADM

## 2025-08-26 RX ADMIN — CARBIDOPA AND LEVODOPA 1 TABLET: 10; 100 TABLET ORAL at 09:08

## 2025-08-26 RX ADMIN — HYDROXYZINE HYDROCHLORIDE 50 MG: 50 TABLET, FILM COATED ORAL at 09:08

## 2025-08-26 RX ADMIN — ATORVASTATIN CALCIUM 10 MG: 10 TABLET, FILM COATED ORAL at 09:08

## 2025-08-26 RX ADMIN — ESCITALOPRAM OXALATE 20 MG: 10 TABLET ORAL at 09:08

## 2025-08-26 RX ADMIN — PANTOPRAZOLE SODIUM 40 MG: 40 TABLET, DELAYED RELEASE ORAL at 09:08

## 2025-08-26 RX ADMIN — APIXABAN 5 MG: 5 TABLET, FILM COATED ORAL at 09:08

## 2025-08-26 RX ADMIN — NALOXEGOL OXALATE 25 MG: 25 TABLET, FILM COATED ORAL at 06:08

## 2025-08-26 RX ADMIN — HYDROCODONE BITARTRATE AND ACETAMINOPHEN 1 TABLET: 10; 325 TABLET ORAL at 06:08

## 2025-08-26 RX ADMIN — AMANTADINE HYDROCHLORIDE 200 MG: 100 CAPSULE, LIQUID FILLED ORAL at 09:08

## 2025-08-26 RX ADMIN — BUPROPION HYDROCHLORIDE 100 MG: 100 TABLET, FILM COATED ORAL at 09:08

## 2025-08-26 RX ADMIN — LIDOCAINE HYDROCHLORIDE 15 ML: 20 SOLUTION ORAL; TOPICAL at 09:08

## 2025-08-26 RX ADMIN — MUPIROCIN: 20 OINTMENT TOPICAL at 09:08

## 2025-08-26 RX ADMIN — LIDOCAINE HYDROCHLORIDE 15 ML: 20 SOLUTION ORAL; TOPICAL at 06:08

## (undated) DEVICE — TUBING AIRLIFE O2 VINYL 7FT

## (undated) DEVICE — JELLY KY LUBRICATING 5G PACKET

## (undated) DEVICE — TUBING SUC MEDI-VAC CONN 6FT

## (undated) DEVICE — OXISENSOR ADULT DIGIT N/S

## (undated) DEVICE — KIT CLN RIVITAL-OX ENDOSCP

## (undated) DEVICE — VALVE OLYMPUS SCOPE SUCTION

## (undated) DEVICE — GLOVE PROTEXIS NEOPRN SZ8

## (undated) DEVICE — ELECTRODE RED DOT EKG

## (undated) DEVICE — ADAPTER NUT NIPPLE

## (undated) DEVICE — SYR SLIP TIP 10ML SHIELD

## (undated) DEVICE — ATOMIZER NASAL DRUG DEL NO SYR

## (undated) DEVICE — KIT CANIST SUCTION 1200CC

## (undated) DEVICE — VALVE OLYMPUS SCOPE BIOPSY

## (undated) DEVICE — BRUSH CLEANING EBUS SMALL

## (undated) DEVICE — GAUZE SPONGE 4X4 12PLY

## (undated) DEVICE — WRAP STERILIZATION 45X45 DISP

## (undated) DEVICE — SOL IRR SOD CHL .9% POUR

## (undated) DEVICE — SET LEADWIRE PINCH 3 LD 50IN

## (undated) DEVICE — BOWL STERILE LARGE 32OZ

## (undated) DEVICE — APPLICATOR STRL COT 2INNR 6IN

## (undated) DEVICE — SOLIDIFIER BOTTLE 1500CC

## (undated) DEVICE — CONNECTOR TUBING OXYGEN

## (undated) DEVICE — GOWN ISOLATION OTH SMS YLW LG

## (undated) DEVICE — NEBULIZER INTIN UP-DRAFT II NE

## (undated) DEVICE — LMA I-GEL  5.0 ADULT LG 90+ KG

## (undated) DEVICE — SYR ONLY LEUR TIP 30CC

## (undated) DEVICE — TIP SUCTION YANKAUER